# Patient Record
Sex: FEMALE | Race: OTHER | HISPANIC OR LATINO | URBAN - METROPOLITAN AREA
[De-identification: names, ages, dates, MRNs, and addresses within clinical notes are randomized per-mention and may not be internally consistent; named-entity substitution may affect disease eponyms.]

---

## 2018-03-19 ENCOUNTER — INPATIENT (INPATIENT)
Facility: HOSPITAL | Age: 53
LOS: 5 days | Discharge: HOME | DRG: 853 | End: 2018-03-25
Attending: SURGERY | Admitting: SURGERY
Payer: COMMERCIAL

## 2018-03-19 VITALS
TEMPERATURE: 100 F | HEART RATE: 94 BPM | SYSTOLIC BLOOD PRESSURE: 109 MMHG | RESPIRATION RATE: 27 BRPM | DIASTOLIC BLOOD PRESSURE: 56 MMHG | OXYGEN SATURATION: 98 %

## 2018-03-19 RX ORDER — INSULIN LISPRO 100/ML
VIAL (ML) SUBCUTANEOUS EVERY 6 HOURS
Qty: 0 | Refills: 0 | Status: DISCONTINUED | OUTPATIENT
Start: 2018-03-19 | End: 2018-03-20

## 2018-03-19 RX ORDER — DEXTROSE 50 % IN WATER 50 %
1 SYRINGE (ML) INTRAVENOUS ONCE
Qty: 0 | Refills: 0 | Status: DISCONTINUED | OUTPATIENT
Start: 2018-03-19 | End: 2018-03-22

## 2018-03-19 RX ORDER — DEXTROSE 50 % IN WATER 50 %
12.5 SYRINGE (ML) INTRAVENOUS ONCE
Qty: 0 | Refills: 0 | Status: DISCONTINUED | OUTPATIENT
Start: 2018-03-19 | End: 2018-03-22

## 2018-03-19 RX ORDER — ACETAMINOPHEN 500 MG
650 TABLET ORAL EVERY 6 HOURS
Qty: 0 | Refills: 0 | Status: DISCONTINUED | OUTPATIENT
Start: 2018-03-19 | End: 2018-03-20

## 2018-03-19 RX ORDER — SODIUM CHLORIDE 9 MG/ML
1000 INJECTION, SOLUTION INTRAVENOUS
Qty: 0 | Refills: 0 | Status: DISCONTINUED | OUTPATIENT
Start: 2018-03-19 | End: 2018-03-24

## 2018-03-19 RX ORDER — GLUCAGON INJECTION, SOLUTION 0.5 MG/.1ML
1 INJECTION, SOLUTION SUBCUTANEOUS ONCE
Qty: 0 | Refills: 0 | Status: DISCONTINUED | OUTPATIENT
Start: 2018-03-19 | End: 2018-03-22

## 2018-03-19 NOTE — H&P ADULT - NSHPPHYSICALEXAM_GEN_ALL_CORE
.  VITAL SIGNS:  T(C): 37.9 (03-19-18 @ 23:51), Max: 37.9 (03-19-18 @ 23:51)  T(F): 100.2 (03-19-18 @ 23:51), Max: 100.2 (03-19-18 @ 23:51)  HR: 98 (03-20-18 @ 00:00) (94 - 98)  BP: 104/78 (03-20-18 @ 00:00) (104/78 - 109/56)  BP(mean): 91 (03-20-18 @ 00:00) (82 - 91)  RR: 32 (03-20-18 @ 00:00) (27 - 32)  SpO2: 97% (03-20-18 @ 00:00) (97% - 98%)  Wt(kg): --    PHYSICAL EXAM:    Constitutional: WDWN, In some distress 2/2 pain, pale   Head: NC/AT  Eyes: PERRL, EOMI, anicteric sclera, arcus senilis bilaterally  ENT: no nasal discharge; uvula midline, some erythema, severely dry mucous membranes  Neck: supple; no JVD, significant hard and tender LAD bilaterally and possibly enlarged thyroid though unclear 2/2 body habitus  Respiratory: CTA B/L; no W/R/R,   Cardiac: +S1/S2; RRR; no M/R/G;   Gastrointestinal: soft, tender pain in the epigastric region, non-radiating, ND; no rebound or guarding; +BSx4. Negative murphys.   Extremities: WWP, no clubbing or cyanosis; no peripheral edema  Musculoskeletal: NROM x4; no joint swelling, tenderness or erythema  Vascular: 2+ radial, femoral, DP/PT pulses B/L  Dermatologic: skin warm, dry and intact; no rashes, wounds, or scars  Neurologic: AAOx3; CNII-XII grossly intact; no focal deficits  Psychiatric: affect and characteristics of appearance, verbalizations, behaviors are appropriate

## 2018-03-19 NOTE — H&P ADULT - ASSESSMENT
52YF w/ pmhx of DM and intermittent hypotension presents to [facility] w/ prolonged history of epigastric pain, poor appetite, n/v c/f severe GERD vs. ulceration vs. malignancy.     GI: Symptoms c/w possible H. Pylori/GERD (patient from endemic region, never had EGD/colonsocopy or work up). Hiatal hernia vs. esophageal stricture/dysmotility (though does not fit HPI) vs. achalasia. Stomach ulcers, may be chronic, no melanotic stool. Malignancy whether gastric, pancreatic, or esophageal (LAD is concerning). Lower suspicion for cholangitis based on clinical picture and toxicity.   - GI aware, to to ERCP in AM   - H. pylori stool antigen   - NPO   - Active T&S   - Zosyn for empiric intraabdominal infection   - f/u Bcx     RESP: Erythema in throat, complains of + throat pain on swallowing, no nasal congestion/rhinorrhea or cough. Some c/f strep vs. viral illness (may have precipitated her lightheadedness).   - f/u RVP and strep   - f/u AM CXR     CARDIO: Never had chest pain, ACS symptoms. No edema/orthopnea. Never seen a cardiologist or gotten a stress test. Unclear etiology for intermittent hypotension (12 years now), may be neurogenic/vasovagal vs. arrythmia. Patient recalls events.   - f/u EKG     ENDO: On metformin 500 at home. Possible euthyroid sick syndrome w/ enlarged thyroid (subacute vs. deQuervain)   - f/u AM Hba1c   - f/u TSH panel     RENAL: Urinary frequency of unclear etiology, no dysuria or overflow incontinence per patient.   - f/u UA     FEN:  F: NS 200cc/hr   E: Replete PRN  N: NPO  R IJ central   FULL CODE 52YF w/ pmhx of DM and intermittent hypotension presents to [facility] w/ prolonged history of epigastric pain, poor appetite, n/v c/f severe GERD vs. ulceration vs. malignancy.     GI: Symptoms c/w possible H. Pylori/GERD (patient from endemic region, never had EGD/colonsocopy or work up). Hiatal hernia vs. esophageal stricture/dysmotility (though does not fit HPI) vs. achalasia. Stomach ulcers, may be chronic, no melanotic stool. Malignancy whether gastric, pancreatic, or esophageal (LAD is concerning). Lower suspicion for cholangitis based on clinical picture and toxicity.   - GI aware, to to ERCP in AM   - H. pylori stool antigen   - NPO   - Active T&S   - Zosyn for empiric intraabdominal infection   - f/u Bcx   - PPI     RESP: Erythema in throat, complains of + throat pain on swallowing, no nasal congestion/rhinorrhea or cough. Some c/f strep vs. viral illness (may have precipitated her lightheadedness).   - f/u RVP and strep   - f/u AM CXR     CARDIO: Never had chest pain, ACS symptoms. No edema/orthopnea. Never seen a cardiologist or gotten a stress test. Unclear etiology for intermittent hypotension (12 years now), may be neurogenic/vasovagal vs. arrythmia. Patient recalls events.   - f/u EKG     ENDO: On metformin 500 at home. Possible euthyroid sick syndrome w/ enlarged thyroid (subacute vs. deQuervain)   - f/u AM Hba1c   - f/u TSH panel   - ISS     RENAL: Urinary frequency of unclear etiology, no dysuria or overflow incontinence per patient.   - f/u UA     FEN:  F: NS 200cc/hr   E: Replete PRN  N: NPO  R IJ central   FULL CODE 52YF w/ pmhx of DM and intermittent hypotension presents to [facility] w/ prolonged history of epigastric pain, poor appetite, n/v c/f severe GERD vs. ulceration vs. malignancy.     GI: Symptoms c/w possible H. Pylori/GERD (patient from endemic region, never had EGD/colonsocopy or work up). Hiatal hernia vs. esophageal stricture/dysmotility (though does not fit HPI) vs. achalasia. Stomach ulcers, may be chronic, no melanotic stool. Malignancy whether gastric, pancreatic, or esophageal (LAD is concerning). Lower suspicion for cholangitis based on clinical picture and toxicity. Admitted for severe sepsis.   - GI aware, to to ERCP in AM   - H. pylori stool antigen   - NPO   - Active T&S   - Zosyn for empiric intraabdominal infection   - f/u Bcx   - PPI     RESP: Erythema in throat, complains of + throat pain on swallowing, no nasal congestion/rhinorrhea or cough. Some c/f strep vs. viral illness (may have precipitated her lightheadedness).   - f/u RVP and strep   - f/u AM CXR     CARDIO: Never had chest pain, ACS symptoms. No edema/orthopnea. Never seen a cardiologist or gotten a stress test. Unclear etiology for intermittent hypotension (12 years now), may be neurogenic/vasovagal vs. arrythmia. Patient recalls events.   - f/u EKG     ENDO: On metformin 500 at home. Possible euthyroid sick syndrome w/ enlarged thyroid (subacute vs. deQuervain)   - f/u AM Hba1c   - f/u TSH panel   - ISS     RENAL: Urinary frequency of unclear etiology, no dysuria or overflow incontinence per patient.   - f/u UA     FEN:  F: NS 200cc/hr   E: Replete PRN  N: NPO  R IJ central   FULL CODE

## 2018-03-19 NOTE — H&P ADULT - HISTORY OF PRESENT ILLNESS
52YF w/ pmhx of _____ presented to [facility] w/ RUQ pain n/v found to have elevated LFTs, lipase of 682, and CT abd that showed ____ c/f gallstone pancreatitits w/ hypotension requiring several NS bolus and peripheral levo. No leukocytosis or fever, but lactate of 7.6. Planned for ERCP on Wed. but determined to be too acute and transferred to [facility] for earlier ERCP. GI consulted and surgery made aware (Dr. Richards). 52YF w/ pmhx of ?DM (prev. hba1c of ?8.1%) and intermittent hypotension of unknown etiology (never been worked up by a cardiologist) presented to [facility] after being found unconscious on her couch and 'green'. She has had a 6mo hx of epigastric pain, non-radiating, dull, with inability to swallow solid foods and reduced appetite associated w/ n/v and 8-10 pound weight loss. Able to tolerate fluids. No diarrhea or change in stool habits. Feels as if food gets 'stuck' in epigastric region. No significant pain with swallowing in the upper esophageal region. Also notable is a history of intermittent swinging f/c, with chills predominantly at night requiring multiple blankets and painful lymph nodes in the neck bilaterally. Recent breast biopsy in Feb. found to be benign.     At [facility] patient with severe epigastric pain found to have elevated LFTs, lipase of 682, and CT a/p gallstone pancreatitis w/ hypotension requiring several L NS and peripheral levo. No leukocytosis or fever, but lactate of 7.6. Planned for ERCP on Wed. but determined to be too acute and transferred to [facility] for earlier ERCP. GI consulted and surgery made aware (Dr. Richards).     No recent travel. Immigrated from [facility] 35 years ago. No cough. Complains of increased urinary frequency the last couple months but no dysuria. One week of steroid use in february for ?sinusitis. 52YF w/ pmhx of ?DM (prev. hba1c of ?8.1%) and intermittent hypotension of unknown etiology (never been worked up by a cardiologist) presented to [facility] after being found unconscious on her couch and 'green'. She has had a 6mo hx of epigastric pain, non-radiating, dull, with inability to swallow solid foods and reduced appetite associated w/ n/v and 8-10 pound weight loss. Able to tolerate fluids. No diarrhea or change in stool habits. Feels as if food gets 'stuck' in epigastric region. No significant pain with swallowing in the upper esophageal region. Also notable is a history of intermittent swinging f/c, with chills predominantly at night requiring multiple blankets and painful lymph nodes in the neck bilaterally. Recent breast biopsy in Feb. found to be benign.     At [facility] patient with severe epigastric pain found to have elevated LFTs, lipase of 682, and CT a/p c/f gallstone pancreatitis w/ hypotension requiring several L NS and peripheral levo. No leukocytosis or fever, but lactate of 7.6. Planned for ERCP on Wed. but determined to be too acute and transferred to [facility] for earlier ERCP. GI consulted and surgery made aware (Dr. Richards). Given zosyn/clinda.     No recent travel. Immigrated from [facility] 35 years ago. No cough. Complains of increased urinary frequency the last couple months but no dysuria. One week of steroid use in february for ?sinusitis. 52YF w/ pmhx of ?DM (prev. hba1c of ?8.1%) and intermittent hypotension of unknown etiology (never been worked up by a cardiologist) presented to [facility] after being found unconscious on her couch and 'green'. She has had a 6mo hx of epigastric pain, non-radiating, dull, with inability to swallow solid foods and reduced appetite associated w/ n/v and 8-10 pound weight loss. Able to tolerate fluids. No diarrhea or change in stool habits. Feels as if food gets 'stuck' in epigastric region. No significant pain with swallowing in the upper esophageal region. Also notable is a history of intermittent swinging f/c, with chills predominantly at night requiring multiple blankets and painful lymph nodes in the neck bilaterally. Recent breast biopsy in Feb. found to be benign.     At [facility] patient with severe epigastric pain found to have elevated LFTs 214/112, lipase of 682, Tbili 2.8, alk phos 457, and CT a/p c/f choledoco w/ 3 small distal calculi in common bile duct with mild extrahepatic ductal dilation w/ CBD measure 1cm. Christina-pancreatic tail edema.     Had hypotension requiring several L NS and peripheral levo and tachycardia to 140s. No leukocytosis or fever, but lactate of 7.6. Planned for ERCP on Wed. but determined to be too acute and transferred to [facility] for earlier ERCP. GI consulted and surgery made aware (Dr. Richards). Given zosyn/clinda.     No recent travel. Immig[place] from [facility] 35 years ago. No cough. Complains of increased urinary frequency the last couple months but no dysuria. One week of steroid use in february for ?sinusitis. 52YF w/ pmhx of ?DM (prev. hba1c of ?8.1%) and intermittent hypotension of unknown etiology (never been worked up by a cardiologist) presented to [facility] after being found unconscious on her couch and 'green'. She has had a 6mo hx of epigastric pain, non-radiating, dull, with inability to swallow solid foods and reduced appetite associated w/ n/v and 8-10 pound weight loss. Able to tolerate fluids. No diarrhea or change in stool habits. Feels as if food gets 'stuck' in epigastric region. No significant pain with swallowing in the upper esophageal region. Also notable is a history of intermittent swinging f/c, with chills predominantly at night requiring multiple blankets and painful lymph nodes in the neck bilaterally. Recent breast biopsy in Feb. found to be benign.     At [facility] patient with severe epigastric pain found to have elevated LFTs 214/112, lipase of 682, Tbili 2.8, alk phos 457, and CT a/p c/f choledoco w/ 3 small distal calculi in common bile duct with mild extrahepatic ductal dilation w/ CBD measure 1cm. Christina-pancreatic tail edema.     Had refractory hypotension requiring several L NS w/ shaniqua of 80 systolic and peripheral levo and tachycardia to 140s. No leukocytosis or fever, but lactate of 7.6. Planned for ERCP on Wed. but determined to be too acute and transferred to [facility] for earlier ERCP. GI consulted and surgery made aware (Dr. Richards). Given zosyn/clinda.     No recent travel. Immigrated from [facility] 35 years ago. No cough. Complains of increased urinary frequency the last couple months but no dysuria. One week of steroid use in february for ?sinusitis.

## 2018-03-20 LAB
-  K. PNEUMONIAE GROUP: SIGNIFICANT CHANGE UP
ALBUMIN SERPL ELPH-MCNC: 2.6 G/DL — LOW (ref 3.3–5)
ALBUMIN SERPL ELPH-MCNC: 2.9 G/DL — LOW (ref 3.3–5)
ALP SERPL-CCNC: 264 U/L — HIGH (ref 40–120)
ALP SERPL-CCNC: 301 U/L — HIGH (ref 40–120)
ALT FLD-CCNC: 84 U/L — HIGH (ref 10–45)
ALT FLD-CCNC: 90 U/L — HIGH (ref 10–45)
ANION GAP SERPL CALC-SCNC: 17 MMOL/L — SIGNIFICANT CHANGE UP (ref 5–17)
ANION GAP SERPL CALC-SCNC: 8 MMOL/L — SIGNIFICANT CHANGE UP (ref 5–17)
APPEARANCE UR: (no result)
APTT BLD: 21.6 SEC — LOW (ref 27.5–37.4)
AST SERPL-CCNC: 147 U/L — HIGH (ref 10–40)
AST SERPL-CCNC: 169 U/L — HIGH (ref 10–40)
BILIRUB DIRECT SERPL-MCNC: 2.4 MG/DL — HIGH (ref 0–0.2)
BILIRUB SERPL-MCNC: 3.1 MG/DL — HIGH (ref 0.2–1.2)
BILIRUB SERPL-MCNC: 3.2 MG/DL — HIGH (ref 0.2–1.2)
BILIRUB UR-MCNC: (no result)
BLD GP AB SCN SERPL QL: NEGATIVE — SIGNIFICANT CHANGE UP
BLD GP AB SCN SERPL QL: NEGATIVE — SIGNIFICANT CHANGE UP
BUN SERPL-MCNC: 11 MG/DL — SIGNIFICANT CHANGE UP (ref 7–23)
BUN SERPL-MCNC: 9 MG/DL — SIGNIFICANT CHANGE UP (ref 7–23)
CALCIUM SERPL-MCNC: 7.9 MG/DL — LOW (ref 8.4–10.5)
CALCIUM SERPL-MCNC: 8.1 MG/DL — LOW (ref 8.4–10.5)
CHLORIDE SERPL-SCNC: 105 MMOL/L — SIGNIFICANT CHANGE UP (ref 96–108)
CHLORIDE SERPL-SCNC: 97 MMOL/L — SIGNIFICANT CHANGE UP (ref 96–108)
CHOLEST SERPL-MCNC: 152 MG/DL — SIGNIFICANT CHANGE UP (ref 10–199)
CK MB CFR SERPL CALC: 1.8 NG/ML — SIGNIFICANT CHANGE UP (ref 0–6.7)
CK SERPL-CCNC: 120 U/L — SIGNIFICANT CHANGE UP (ref 25–170)
CO2 SERPL-SCNC: 21 MMOL/L — LOW (ref 22–31)
CO2 SERPL-SCNC: 25 MMOL/L — SIGNIFICANT CHANGE UP (ref 22–31)
COLOR SPEC: YELLOW — SIGNIFICANT CHANGE UP
CREAT SERPL-MCNC: 0.8 MG/DL — SIGNIFICANT CHANGE UP (ref 0.5–1.3)
CREAT SERPL-MCNC: 0.99 MG/DL — SIGNIFICANT CHANGE UP (ref 0.5–1.3)
DIFF PNL FLD: NEGATIVE — SIGNIFICANT CHANGE UP
GAS PNL BLDV: SIGNIFICANT CHANGE UP
GLUCOSE BLDC GLUCOMTR-MCNC: 136 MG/DL — HIGH (ref 70–99)
GLUCOSE BLDC GLUCOMTR-MCNC: 207 MG/DL — HIGH (ref 70–99)
GLUCOSE BLDC GLUCOMTR-MCNC: 216 MG/DL — HIGH (ref 70–99)
GLUCOSE BLDC GLUCOMTR-MCNC: 261 MG/DL — HIGH (ref 70–99)
GLUCOSE BLDC GLUCOMTR-MCNC: 283 MG/DL — HIGH (ref 70–99)
GLUCOSE SERPL-MCNC: 240 MG/DL — HIGH (ref 70–99)
GLUCOSE SERPL-MCNC: 377 MG/DL — HIGH (ref 70–99)
GLUCOSE UR QL: >=1000
GRAM STN FLD: SIGNIFICANT CHANGE UP
HBA1C BLD-MCNC: 10.6 % — HIGH (ref 4–5.6)
HCT VFR BLD CALC: 32.5 % — LOW (ref 34.5–45)
HCT VFR BLD CALC: 35.4 % — SIGNIFICANT CHANGE UP (ref 34.5–45)
HDLC SERPL-MCNC: 50 MG/DL — SIGNIFICANT CHANGE UP (ref 40–125)
HGB BLD-MCNC: 10.8 G/DL — LOW (ref 11.5–15.5)
HGB BLD-MCNC: 11.9 G/DL — SIGNIFICANT CHANGE UP (ref 11.5–15.5)
INR BLD: 1.26 — HIGH (ref 0.88–1.16)
KETONES UR-MCNC: (no result) MG/DL
LACTATE SERPL-SCNC: 1.5 MMOL/L — SIGNIFICANT CHANGE UP (ref 0.5–2)
LACTATE SERPL-SCNC: 3.3 MMOL/L — HIGH (ref 0.5–2)
LEUKOCYTE ESTERASE UR-ACNC: NEGATIVE — SIGNIFICANT CHANGE UP
LIDOCAIN IGE QN: 39 U/L — SIGNIFICANT CHANGE UP (ref 7–60)
LIPID PNL WITH DIRECT LDL SERPL: 74 MG/DL — SIGNIFICANT CHANGE UP
LYMPHOCYTES # BLD AUTO: 15 % — SIGNIFICANT CHANGE UP (ref 13–44)
LYMPHOCYTES # BLD AUTO: 6 % — LOW (ref 13–44)
MAGNESIUM SERPL-MCNC: 1.2 MG/DL — LOW (ref 1.6–2.6)
MAGNESIUM SERPL-MCNC: 3.1 MG/DL — HIGH (ref 1.6–2.6)
MCHC RBC-ENTMCNC: 30.3 PG — SIGNIFICANT CHANGE UP (ref 27–34)
MCHC RBC-ENTMCNC: 30.8 PG — SIGNIFICANT CHANGE UP (ref 27–34)
MCHC RBC-ENTMCNC: 33.2 G/DL — SIGNIFICANT CHANGE UP (ref 32–36)
MCHC RBC-ENTMCNC: 33.6 G/DL — SIGNIFICANT CHANGE UP (ref 32–36)
MCV RBC AUTO: 91.3 FL — SIGNIFICANT CHANGE UP (ref 80–100)
MCV RBC AUTO: 91.7 FL — SIGNIFICANT CHANGE UP (ref 80–100)
METHOD TYPE: SIGNIFICANT CHANGE UP
MONOCYTES NFR BLD AUTO: 5 % — SIGNIFICANT CHANGE UP (ref 2–14)
MONOCYTES NFR BLD AUTO: 6 % — SIGNIFICANT CHANGE UP (ref 2–14)
NEUTROPHILS NFR BLD AUTO: 59 % — SIGNIFICANT CHANGE UP (ref 43–77)
NEUTROPHILS NFR BLD AUTO: 67 % — SIGNIFICANT CHANGE UP (ref 43–77)
NITRITE UR-MCNC: NEGATIVE — SIGNIFICANT CHANGE UP
PH UR: 5.5 — SIGNIFICANT CHANGE UP (ref 5–8)
PHOSPHATE SERPL-MCNC: 3.3 MG/DL — SIGNIFICANT CHANGE UP (ref 2.5–4.5)
PLATELET # BLD AUTO: 168 K/UL — SIGNIFICANT CHANGE UP (ref 150–400)
PLATELET # BLD AUTO: 190 K/UL — SIGNIFICANT CHANGE UP (ref 150–400)
POTASSIUM SERPL-MCNC: 3.8 MMOL/L — SIGNIFICANT CHANGE UP (ref 3.5–5.3)
POTASSIUM SERPL-MCNC: 4 MMOL/L — SIGNIFICANT CHANGE UP (ref 3.5–5.3)
POTASSIUM SERPL-SCNC: 3.8 MMOL/L — SIGNIFICANT CHANGE UP (ref 3.5–5.3)
POTASSIUM SERPL-SCNC: 4 MMOL/L — SIGNIFICANT CHANGE UP (ref 3.5–5.3)
PROT SERPL-MCNC: 6.6 G/DL — SIGNIFICANT CHANGE UP (ref 6–8.3)
PROT SERPL-MCNC: 7.5 G/DL — SIGNIFICANT CHANGE UP (ref 6–8.3)
PROT UR-MCNC: (no result) MG/DL
PROTHROM AB SERPL-ACNC: 14 SEC — HIGH (ref 9.8–12.7)
RAPID RVP RESULT: SIGNIFICANT CHANGE UP
RBC # BLD: 3.56 M/UL — LOW (ref 3.8–5.2)
RBC # BLD: 3.86 M/UL — SIGNIFICANT CHANGE UP (ref 3.8–5.2)
RBC # FLD: 13.4 % — SIGNIFICANT CHANGE UP (ref 10.3–16.9)
RBC # FLD: 13.5 % — SIGNIFICANT CHANGE UP (ref 10.3–16.9)
RH IG SCN BLD-IMP: POSITIVE — SIGNIFICANT CHANGE UP
RH IG SCN BLD-IMP: POSITIVE — SIGNIFICANT CHANGE UP
S PYO AG SPEC QL IA: NEGATIVE — SIGNIFICANT CHANGE UP
SODIUM SERPL-SCNC: 135 MMOL/L — SIGNIFICANT CHANGE UP (ref 135–145)
SODIUM SERPL-SCNC: 138 MMOL/L — SIGNIFICANT CHANGE UP (ref 135–145)
SP GR SPEC: 1.01 — SIGNIFICANT CHANGE UP (ref 1–1.03)
TOTAL CHOLESTEROL/HDL RATIO MEASUREMENT: 3 RATIO — LOW (ref 3.3–7.1)
TRIGL SERPL-MCNC: 140 MG/DL — SIGNIFICANT CHANGE UP (ref 10–149)
TROPONIN T SERPL-MCNC: <0.01 NG/ML — SIGNIFICANT CHANGE UP (ref 0–0.01)
TSH SERPL-MCNC: 0.68 UIU/ML — SIGNIFICANT CHANGE UP (ref 0.35–4.94)
UROBILINOGEN FLD QL: 1 E.U./DL — SIGNIFICANT CHANGE UP
WBC # BLD: 13.8 K/UL — HIGH (ref 3.8–10.5)
WBC # BLD: 15.5 K/UL — HIGH (ref 3.8–10.5)
WBC # FLD AUTO: 13.8 K/UL — HIGH (ref 3.8–10.5)
WBC # FLD AUTO: 15.5 K/UL — HIGH (ref 3.8–10.5)

## 2018-03-20 PROCEDURE — 71045 X-RAY EXAM CHEST 1 VIEW: CPT | Mod: 26

## 2018-03-20 PROCEDURE — 99233 SBSQ HOSP IP/OBS HIGH 50: CPT | Mod: GC

## 2018-03-20 PROCEDURE — 43262 ENDO CHOLANGIOPANCREATOGRAPH: CPT

## 2018-03-20 RX ORDER — PANTOPRAZOLE SODIUM 20 MG/1
40 TABLET, DELAYED RELEASE ORAL DAILY
Qty: 0 | Refills: 0 | Status: DISCONTINUED | OUTPATIENT
Start: 2018-03-20 | End: 2018-03-24

## 2018-03-20 RX ORDER — HEPARIN SODIUM 5000 [USP'U]/ML
5000 INJECTION INTRAVENOUS; SUBCUTANEOUS EVERY 8 HOURS
Qty: 0 | Refills: 0 | Status: DISCONTINUED | OUTPATIENT
Start: 2018-03-20 | End: 2018-03-24

## 2018-03-20 RX ORDER — NOREPINEPHRINE BITARTRATE/D5W 8 MG/250ML
0.01 PLASTIC BAG, INJECTION (ML) INTRAVENOUS
Qty: 8 | Refills: 0 | Status: DISCONTINUED | OUTPATIENT
Start: 2018-03-20 | End: 2018-03-22

## 2018-03-20 RX ORDER — PIPERACILLIN AND TAZOBACTAM 4; .5 G/20ML; G/20ML
3.38 INJECTION, POWDER, LYOPHILIZED, FOR SOLUTION INTRAVENOUS ONCE
Qty: 0 | Refills: 0 | Status: COMPLETED | OUTPATIENT
Start: 2018-03-20 | End: 2018-03-20

## 2018-03-20 RX ORDER — TRAMADOL HYDROCHLORIDE 50 MG/1
25 TABLET ORAL EVERY 4 HOURS
Qty: 0 | Refills: 0 | Status: DISCONTINUED | OUTPATIENT
Start: 2018-03-20 | End: 2018-03-20

## 2018-03-20 RX ORDER — INSULIN HUMAN 100 [IU]/ML
2 INJECTION, SOLUTION SUBCUTANEOUS
Qty: 100 | Refills: 0 | Status: DISCONTINUED | OUTPATIENT
Start: 2018-03-20 | End: 2018-03-20

## 2018-03-20 RX ORDER — POTASSIUM CHLORIDE 20 MEQ
20 PACKET (EA) ORAL
Qty: 0 | Refills: 0 | Status: COMPLETED | OUTPATIENT
Start: 2018-03-20 | End: 2018-03-20

## 2018-03-20 RX ORDER — MORPHINE SULFATE 50 MG/1
1 CAPSULE, EXTENDED RELEASE ORAL EVERY 4 HOURS
Qty: 0 | Refills: 0 | Status: DISCONTINUED | OUTPATIENT
Start: 2018-03-20 | End: 2018-03-20

## 2018-03-20 RX ORDER — INSULIN HUMAN 100 [IU]/ML
3 INJECTION, SOLUTION SUBCUTANEOUS ONCE
Qty: 0 | Refills: 0 | Status: DISCONTINUED | OUTPATIENT
Start: 2018-03-20 | End: 2018-03-20

## 2018-03-20 RX ORDER — INSULIN LISPRO 100/ML
VIAL (ML) SUBCUTANEOUS
Qty: 0 | Refills: 0 | Status: DISCONTINUED | OUTPATIENT
Start: 2018-03-20 | End: 2018-03-21

## 2018-03-20 RX ORDER — HEPARIN SODIUM 5000 [USP'U]/ML
5000 INJECTION INTRAVENOUS; SUBCUTANEOUS EVERY 8 HOURS
Qty: 0 | Refills: 0 | Status: DISCONTINUED | OUTPATIENT
Start: 2018-03-20 | End: 2018-03-20

## 2018-03-20 RX ORDER — SODIUM CHLORIDE 9 MG/ML
1000 INJECTION INTRAMUSCULAR; INTRAVENOUS; SUBCUTANEOUS
Qty: 0 | Refills: 0 | Status: DISCONTINUED | OUTPATIENT
Start: 2018-03-20 | End: 2018-03-21

## 2018-03-20 RX ORDER — PIPERACILLIN AND TAZOBACTAM 4; .5 G/20ML; G/20ML
3.38 INJECTION, POWDER, LYOPHILIZED, FOR SOLUTION INTRAVENOUS EVERY 6 HOURS
Qty: 0 | Refills: 0 | Status: DISCONTINUED | OUTPATIENT
Start: 2018-03-20 | End: 2018-03-22

## 2018-03-20 RX ORDER — MAGNESIUM SULFATE 500 MG/ML
4 VIAL (ML) INJECTION ONCE
Qty: 0 | Refills: 0 | Status: COMPLETED | OUTPATIENT
Start: 2018-03-20 | End: 2018-03-20

## 2018-03-20 RX ORDER — MORPHINE SULFATE 50 MG/1
2 CAPSULE, EXTENDED RELEASE ORAL EVERY 4 HOURS
Qty: 0 | Refills: 0 | Status: DISCONTINUED | OUTPATIENT
Start: 2018-03-20 | End: 2018-03-20

## 2018-03-20 RX ADMIN — Medication 6: at 22:56

## 2018-03-20 RX ADMIN — PIPERACILLIN AND TAZOBACTAM 200 GRAM(S): 4; .5 INJECTION, POWDER, LYOPHILIZED, FOR SOLUTION INTRAVENOUS at 12:01

## 2018-03-20 RX ADMIN — PIPERACILLIN AND TAZOBACTAM 200 GRAM(S): 4; .5 INJECTION, POWDER, LYOPHILIZED, FOR SOLUTION INTRAVENOUS at 01:08

## 2018-03-20 RX ADMIN — PIPERACILLIN AND TAZOBACTAM 200 GRAM(S): 4; .5 INJECTION, POWDER, LYOPHILIZED, FOR SOLUTION INTRAVENOUS at 23:04

## 2018-03-20 RX ADMIN — SODIUM CHLORIDE 100 MILLILITER(S): 9 INJECTION INTRAMUSCULAR; INTRAVENOUS; SUBCUTANEOUS at 23:17

## 2018-03-20 RX ADMIN — PANTOPRAZOLE SODIUM 40 MILLIGRAM(S): 20 TABLET, DELAYED RELEASE ORAL at 12:01

## 2018-03-20 RX ADMIN — Medication 1.27 MICROGRAM(S)/KG/MIN: at 20:33

## 2018-03-20 RX ADMIN — Medication 100 GRAM(S): at 02:27

## 2018-03-20 RX ADMIN — Medication 100 MILLIEQUIVALENT(S): at 03:57

## 2018-03-20 RX ADMIN — TRAMADOL HYDROCHLORIDE 25 MILLIGRAM(S): 50 TABLET ORAL at 03:57

## 2018-03-20 RX ADMIN — HEPARIN SODIUM 5000 UNIT(S): 5000 INJECTION INTRAVENOUS; SUBCUTANEOUS at 22:56

## 2018-03-20 RX ADMIN — PIPERACILLIN AND TAZOBACTAM 200 GRAM(S): 4; .5 INJECTION, POWDER, LYOPHILIZED, FOR SOLUTION INTRAVENOUS at 05:53

## 2018-03-20 RX ADMIN — Medication 6: at 01:34

## 2018-03-20 RX ADMIN — Medication 100 MILLIEQUIVALENT(S): at 02:28

## 2018-03-20 RX ADMIN — Medication 1.27 MICROGRAM(S)/KG/MIN: at 03:57

## 2018-03-20 RX ADMIN — Medication 4: at 05:55

## 2018-03-20 RX ADMIN — PIPERACILLIN AND TAZOBACTAM 200 GRAM(S): 4; .5 INJECTION, POWDER, LYOPHILIZED, FOR SOLUTION INTRAVENOUS at 18:28

## 2018-03-20 RX ADMIN — SODIUM CHLORIDE 250 MILLILITER(S): 9 INJECTION INTRAMUSCULAR; INTRAVENOUS; SUBCUTANEOUS at 01:08

## 2018-03-20 RX ADMIN — TRAMADOL HYDROCHLORIDE 25 MILLIGRAM(S): 50 TABLET ORAL at 05:30

## 2018-03-20 NOTE — CONSULT NOTE ADULT - SUBJECTIVE AND OBJECTIVE BOX
HPI:  51 YO F w/ h/o DM and intermittent hypotension of unknown etiology (never been worked up by a cardiologist) presented to [facility] after being found unconscious. Pt reports nausea, vomiting, and RUQ abdominal pain starting at 9 AM yesterday. She also reports dysphagia with solids, but not liquids, and feels food getting stuck in her epigastrium. Reports fever, chills, but denies CP, SOB, melena, hematochezia, diarrhea. Denies previous EGD, last colonoscopy was years ago, does not recall results.     Allergies  No Known Drug Allergies  Seafood (Swelling; Rash)    Home Medications:  metFORMIN 500 mg oral tablet, extended release: 1 tab(s) orally once a day (20 Mar 2018 00:43)    MEDICATIONS:  MEDICATIONS  (STANDING):  dextrose 5%. 1000 milliLiter(s) (50 mL/Hr) IV Continuous <Continuous>  dextrose 50% Injectable 12.5 Gram(s) IV Push once  insulin lispro (HumaLOG) corrective regimen sliding scale   SubCutaneous every 6 hours  norepinephrine Infusion 0.01 MICROgram(s)/kG/Min (1.275 mL/Hr) IV Continuous <Continuous>  pantoprazole  Injectable 40 milliGRAM(s) IV Push daily  piperacillin/tazobactam IVPB. 3.375 Gram(s) IV Intermittent every 6 hours  sodium chloride 0.9%. 1000 milliLiter(s) (100 mL/Hr) IV Continuous <Continuous>    MEDICATIONS  (PRN):  dextrose Gel 1 Dose(s) Oral once PRN Blood Glucose LESS THAN 70 milliGRAM(s)/deciliter  glucagon  Injectable 1 milliGRAM(s) IntraMuscular once PRN Glucose LESS THAN 70 milligrams/deciliter  traMADol 25 milliGRAM(s) Oral every 4 hours PRN Moderate Pain (4 - 6)    PAST MEDICAL & SURGICAL HISTORY:  Diabetes   delivery delivered    FAMILY HISTORY:  No pertinent family history in first degree relatives    SOCIAL HISTORY:  [place]: Denies  Alcohol: Denies  Illicit Drugs: Denies    REVIEW OF SYSTEMS: per HPI    Vital Signs Last 24 Hrs  T(C): 37.1 (20 Mar 2018 06:00), Max: 37.9 (19 Mar 2018 23:51)  T(F): 98.7 (20 Mar 2018 06:00), Max: 100.2 (19 Mar 2018 23:51)  HR: 80 (20 Mar 2018 06:00) (78 - 98)  BP: 96/58 (20 Mar 2018 06:00) (90/52 - 109/56)  BP(mean): 73 (20 Mar 2018 06:00) (69 - 91)  RR: 13 (20 Mar 2018 06:00) (13 - 32)  SpO2: 96% (20 Mar 2018 06:00) (95% - 98%)     @ 07:01  -  03 @ 06:51  --------------------------------------------------------  IN: 2062 mL / OUT: 700 mL / NET: 1362 mL    PHYSICAL EXAM:    General: Well developed; well nourished; in no acute distress  HEENT: MMM, conjunctiva and sclera clear  Gastrointestinal: Soft, RUQ TTP, (+) [place], obese non-distended;  No rebound or guarding  Extremities: Normal range of motion, No clubbing, cyanosis or edema  Neurological: Alert and oriented x3  Skin: Warm and dry. No obvious rash    LABS:                        10.8   13.8  )-----------( 168      ( 20 Mar 2018 06:19 )             32.5     20    135  |  97  |  11  ----------------------------<  377<H>  3.8   |  21<L>  |  0.99    Ca    8.1<L>      20 Mar 2018 00:22  Phos  3.3       Mg     1.2         TPro  7.5  /  Alb  2.9<L>  /  TBili  3.2<H>  /  DBili  2.4<H>  /  AST  169<H>  /  ALT  90<H>  /  AlkPhos  301<H>      PT/INR - ( 20 Mar 2018 00:22 )   PT: 14.0 sec;   INR: 1.26        PTT - ( 20 Mar 2018 00:22 )  PTT:21.6 sec    RADIOLOGY & ADDITIONAL STUDIES:  Xray Chest 1 View-PORTABLE IMMEDIATE (18 @ 04:15) PRELIMINARY    IMPRESSION:  Low lung volumes. Possible small bilateral pleural effusions.   Appropriatelypositioned left-sided IJV catheter.    CT A/P without IVC 3/19/18 @ [facility]:  Impression:  1. Choledocholithiasis. There are approximately 3 small calculi in the distal common duct. Mild extrahepatic ductal dilation with the common duct measuring 1 cm. No intrahepatic biliary ductal dilation to suggest a high-grade obstruction.   2. Mild peripancreatic edema around the pancreatic tail. Please correlate clinically for potential acute gallstone pancreatitis    ADDENDUM:  Two of the punctate choledocholiths may be in the apparent low insertion cystic duct

## 2018-03-20 NOTE — PROGRESS NOTE ADULT - SUBJECTIVE AND OBJECTIVE BOX
INTERVAL HPI/OVERNIGHT EVENTS: Admission to MICU    SUBJECTIVE: Patient seen and examined at bedside.    OBJECTIVE:    VITAL SIGNS:  ICU Vital Signs Last 24 Hrs  T(C): 36.2 (20 Mar 2018 14:00), Max: 37.9 (19 Mar 2018 23:51)  T(F): 97.1 (20 Mar 2018 14:00), Max: 100.2 (19 Mar 2018 23:51)  HR: 66 (20 Mar 2018 13:00) (63 - 98)  BP: 87/52 (20 Mar 2018 12:00) (87/52 - 109/56)  BP(mean): 67 (20 Mar 2018 12:00) (67 - 91)  ABP: 93/50 (20 Mar 2018 13:00) (93/50 - 102/56)  ABP(mean): 66 (20 Mar 2018 13:00) (66 - 74)  RR: 18 (20 Mar 2018 13:00) (13 - 32)  SpO2: 97% (20 Mar 2018 13:00) (93% - 98%)         @ 07: @ 07:00  --------------------------------------------------------  IN: 1962 mL / OUT: 1500 mL / NET: 462 mL     @ 07:01  20 @ 14:32  --------------------------------------------------------  IN: 25 mL / OUT: 600 mL / NET: -575 mL      CAPILLARY BLOOD GLUCOSE  209 (20 Mar 2018 12:00)      POCT Blood Glucose.: 207 mg/dL (20 Mar 2018 11:01)      PHYSICAL EXAM:    General: WDWN ; NAD  HEENT: NC/AT; PERRL, anicteric sclera  Neck: supple, no JVD  Respiratory: CTA B/L; no W/R/R  Cardiovascular: +S1/S2; RRR; no M/R/G  Gastrointestinal: soft, NT/ND; +BS x4  Extremities: WWP; 2+ peripheral pulses B/L; no LE edema  Skin: normal color and turgor; no rash  Neurological:     MEDICATIONS:  MEDICATIONS  (STANDING):  dextrose 5%. 1000 milliLiter(s) (50 mL/Hr) IV Continuous <Continuous>  dextrose 50% Injectable 12.5 Gram(s) IV Push once  insulin lispro (HumaLOG) corrective regimen sliding scale   SubCutaneous Before meals and at bedtime  norepinephrine Infusion 0.01 MICROgram(s)/kG/Min (1.275 mL/Hr) IV Continuous <Continuous>  pantoprazole  Injectable 40 milliGRAM(s) IV Push daily  piperacillin/tazobactam IVPB. 3.375 Gram(s) IV Intermittent every 6 hours  sodium chloride 0.9%. 1000 milliLiter(s) (100 mL/Hr) IV Continuous <Continuous>    MEDICATIONS  (PRN):  dextrose Gel 1 Dose(s) Oral once PRN Blood Glucose LESS THAN 70 milliGRAM(s)/deciliter  glucagon  Injectable 1 milliGRAM(s) IntraMuscular once PRN Glucose LESS THAN 70 milligrams/deciliter  traMADol 25 milliGRAM(s) Oral every 4 hours PRN Moderate Pain (4 - 6)      ALLERGIES:  Allergies    No Known Drug Allergies  Seafood (Swelling; Rash)    Intolerances        LABS:                        10.8   13.8  )-----------( 168      ( 20 Mar 2018 06:19 )             32.5     -    138  |  105  |  9   ----------------------------<  240<H>  4.0   |  25  |  0.80    Ca    7.9<L>      20 Mar 2018 06:19  Phos  3.3     -  Mg     3.1         TPro  6.6  /  Alb  2.6<L>  /  TBili  3.1<H>  /  DBili  x   /  AST  147<H>  /  ALT  84<H>  /  AlkPhos  264<H>  20    LIVER FUNCTIONS - ( 20 Mar 2018 06:19 )  Alb: 2.6 g/dL / Pro: 6.6 g/dL / ALK PHOS: 264 U/L / ALT: 84 U/L / AST: 147 U/L / GGT: x           PT/INR - ( 20 Mar 2018 00:22 )   PT: 14.0 sec;   INR: 1.26          PTT - ( 20 Mar 2018 00:22 )  PTT:21.6 sec  Urinalysis Basic - ( 20 Mar 2018 02:38 )    Color: Yellow / Appearance: SL Cloudy / S.010 / pH: x  Gluc: x / Ketone: Trace mg/dL  / Bili: Small / Urobili: 1.0 E.U./dL   Blood: x / Protein: Trace mg/dL / Nitrite: NEGATIVE   Leuk Esterase: NEGATIVE / RBC: < 5 /HPF / WBC < 5 /HPF   Sq Epi: x / Non Sq Epi: 0-5 /HPF / Bacteria: Present /HPF      CARDIAC MARKERS ( 20 Mar 2018 00:22 )  x     / <0.01 ng/mL / 120 U/L / x     / 1.8 ng/mL        RADIOLOGY & ADDITIONAL TESTS: Reviewed.    ASSESSMENT: 52F with PMH DM, intermittent hypotension presents as transfer from [facility] with chronic epigastric pain, poor appetite, nausea, and vomiting, concern for severe GERD, ulceration, or malignancy    GI  #Epigastric pain  - Concern for broad differential including cholangitis, GERD, hiatal hernia, esophageal stricture/dysmotility. At present in setting of meeting sepsis criteria and clinical symptoms, cholangitis/choledocholithiasis must be ruled out   - GI on board and appreciate recs  - S/p ERCP attempt this morning, unable to cannulate due to anatomy. Two stones were removed in sphincterotomy, as well as pus  - GI will re-attempt ERCP tomorrow  - F/u H. pylori stool antigen   - Maintain active T&S   - Zosyn for empiric coverage of presumed intra-abdominal infection, day 1   - f/u Bcx   - Trend CMP  - Protonix 40mg IV qd  - Clear liquid diet with NPO after midnight in setting of planned ERCP tomorrow    Pulm  #Throat pain   - RVP negative, rapid strep negative  - AM CXR with possible small bilateral pleural effusions    Endocrine  - On home metformin, 500mg, held in favor of sliding scale insulin  - A1C 10.6%  - Will require diabetic education and counseling  - F/u FSG and adjust Lantus as per SSI coverage  - TSH WNL    RENAL  - Increased urinary frequency without dysuria or incontinence  - UA negative for UTI    FEN:  F: IVF NS at 100cc/hr   E: Replete K<4 Mg<2  N: NPO    Line: R IJ    FULL CODE  Dispo: MICU INTERVAL HPI/OVERNIGHT EVENTS: Admission to MICU    SUBJECTIVE: Patient seen and examined at bedside. Examined post ERCP, as patient was in procedure this morning. Patient states that she feels much better, no acute complaints. Denies nausea, emesis, chest pain, shortness of breath, abdominal pain    OBJECTIVE:    VITAL SIGNS:  ICU Vital Signs Last 24 Hrs  T(C): 36.2 (20 Mar 2018 14:00), Max: 37.9 (19 Mar 2018 23:51)  T(F): 97.1 (20 Mar 2018 14:00), Max: 100.2 (19 Mar 2018 23:51)  HR: 66 (20 Mar 2018 13:00) (63 - 98)  BP: 87/52 (20 Mar 2018 12:00) (87/52 - 109/56)  BP(mean): 67 (20 Mar 2018 12:00) (67 - 91)  ABP: 93/50 (20 Mar 2018 13:00) (93/50 - 102/56)  ABP(mean): 66 (20 Mar 2018 13:00) (66 - 74)  RR: 18 (20 Mar 2018 13:00) (13 - 32)  SpO2: 97% (20 Mar 2018 13:00) (93% - 98%)         @ 07:  -   @ 07:00  --------------------------------------------------------  IN: 1962 mL / OUT: 1500 mL / NET: 462 mL     @ 07: @ 14:32  --------------------------------------------------------  IN: 25 mL / OUT: 600 mL / NET: -575 mL      CAPILLARY BLOOD GLUCOSE  209 (20 Mar 2018 12:00)      POCT Blood Glucose.: 207 mg/dL (20 Mar 2018 11:01)      PHYSICAL EXAM:    General: WDWN F in NAD sitting comfortably on chair  HEENT: NC/AT; PERRL  Neck: supple, no JVD appreciated  Respiratory: CTA B/L; no W/R/R  Cardiovascular: +S1/S2; regular rate, no M/R/G  Gastrointestinal: soft, nontender to palpation in all four quadrants, nondistended, no rebound or guarding present  Extremities: WWP; 2+ peripheral pulses B/L; no LE edema  Skin: normal color and turgor; no rash  Neurological: alert and oriented x3, no focal deficits appreciated    MEDICATIONS:  MEDICATIONS  (STANDING):  dextrose 5%. 1000 milliLiter(s) (50 mL/Hr) IV Continuous <Continuous>  dextrose 50% Injectable 12.5 Gram(s) IV Push once  insulin lispro (HumaLOG) corrective regimen sliding scale   SubCutaneous Before meals and at bedtime  norepinephrine Infusion 0.01 MICROgram(s)/kG/Min (1.275 mL/Hr) IV Continuous <Continuous>  pantoprazole  Injectable 40 milliGRAM(s) IV Push daily  piperacillin/tazobactam IVPB. 3.375 Gram(s) IV Intermittent every 6 hours  sodium chloride 0.9%. 1000 milliLiter(s) (100 mL/Hr) IV Continuous <Continuous>    MEDICATIONS  (PRN):  dextrose Gel 1 Dose(s) Oral once PRN Blood Glucose LESS THAN 70 milliGRAM(s)/deciliter  glucagon  Injectable 1 milliGRAM(s) IntraMuscular once PRN Glucose LESS THAN 70 milligrams/deciliter  traMADol 25 milliGRAM(s) Oral every 4 hours PRN Moderate Pain (4 - 6)      ALLERGIES:  Allergies    No Known Drug Allergies  Seafood (Swelling; Rash)    Intolerances        LABS:                        10.8   13.8  )-----------( 168      ( 20 Mar 2018 06:19 )             32.5     -20    138  |  105  |  9   ----------------------------<  240<H>  4.0   |  25  |  0.80    Ca    7.9<L>      20 Mar 2018 06:19  Phos  3.3     -20  Mg     3.1         TPro  6.6  /  Alb  2.6<L>  /  TBili  3.1<H>  /  DBili  x   /  AST  147<H>  /  ALT  84<H>  /  AlkPhos  264<H>      LIVER FUNCTIONS - ( 20 Mar 2018 06:19 )  Alb: 2.6 g/dL / Pro: 6.6 g/dL / ALK PHOS: 264 U/L / ALT: 84 U/L / AST: 147 U/L / GGT: x           PT/INR - ( 20 Mar 2018 00:22 )   PT: 14.0 sec;   INR: 1.26          PTT - ( 20 Mar 2018 00:22 )  PTT:21.6 sec  Urinalysis Basic - ( 20 Mar 2018 02:38 )    Color: Yellow / Appearance: SL Cloudy / S.010 / pH: x  Gluc: x / Ketone: Trace mg/dL  / Bili: Small / Urobili: 1.0 E.U./dL   Blood: x / Protein: Trace mg/dL / Nitrite: NEGATIVE   Leuk Esterase: NEGATIVE / RBC: < 5 /HPF / WBC < 5 /HPF   Sq Epi: x / Non Sq Epi: 0-5 /HPF / Bacteria: Present /HPF      CARDIAC MARKERS ( 20 Mar 2018 00:22 )  x     / <0.01 ng/mL / 120 U/L / x     / 1.8 ng/mL        RADIOLOGY & ADDITIONAL TESTS: Reviewed.    ASSESSMENT: 52F with PMH DM, intermittent hypotension presents as transfer from [facility] with chronic epigastric pain, poor appetite, nausea, and vomiting, concern for severe GERD, ulceration, or malignancy    GI  #Epigastric pain  - Concern for broad differential including cholangitis, GERD, hiatal hernia, esophageal stricture/dysmotility. At present in setting of meeting sepsis criteria and clinical symptoms, cholangitis/choledocholithiasis must be ruled out   - GI on board and appreciate recs  - S/p ERCP attempt this morning, unable to cannulate due to anatomy. Two stones were removed in sphincterotomy, as well as pus  - GI will re-attempt ERCP tomorrow  - F/u H. pylori stool antigen   - Maintain active T&S   - Zosyn for empiric coverage of presumed intra-abdominal infection, day 1   - f/u Bcx   - Trend CMP  - Protonix 40mg IV qd  - Clear liquid diet with NPO after midnight in setting of planned ERCP tomorrow    Pulm  #Throat pain   - RVP negative, rapid strep negative  - AM CXR with possible small bilateral pleural effusions    Endocrine  - On home metformin, 500mg, held in favor of sliding scale insulin  - A1C 10.6%  - Will require diabetic education and counseling  - F/u FSG and adjust Lantus as per SSI coverage  - TSH WNL    RENAL  - Increased urinary frequency without dysuria or incontinence  - UA negative for UTI    FEN:  F: IVF NS at 100cc/hr   E: Replete K<4 Mg<2  N: NPO    Line: R IJ    FULL CODE  Dispo: MICU

## 2018-03-20 NOTE — CONSULT NOTE ADULT - ASSESSMENT
51 YO F w/ h/o DM and intermittent hypotension of unknown etiology (never been worked up by a cardiologist) presented to [place] after being found unconscious found to have elevated LFTs with septic shock 2/2 cholangitis 2/2 choledocholithiasis.     # Septic shock 2/2 cholangitis 2/2 choledocholithiasis  - Lab work and imaging at [place] reviewed  - Pt with bandemia and initial tachycardia and is currently requiring levophed, however, has decreased since transfer to [facility]  - C/w Zosyn  - Follow-up initial blood cultures at [place]  - Will plan for EGD/ERCP this morning to assess for choledocholithiasis and dysphagia  - C/w NPO  - Monitor daily LFTs  - Further plan pending ERCP    Case d/w Dr. Yonatan LENZ will follow

## 2018-03-21 LAB
ALBUMIN SERPL ELPH-MCNC: 2.5 G/DL — LOW (ref 3.3–5)
ALP SERPL-CCNC: 285 U/L — HIGH (ref 40–120)
ALT FLD-CCNC: 95 U/L — HIGH (ref 10–45)
ANION GAP SERPL CALC-SCNC: 10 MMOL/L — SIGNIFICANT CHANGE UP (ref 5–17)
ANION GAP SERPL CALC-SCNC: 13 MMOL/L — SIGNIFICANT CHANGE UP (ref 5–17)
ANION GAP SERPL CALC-SCNC: 8 MMOL/L — SIGNIFICANT CHANGE UP (ref 5–17)
APTT BLD: 35.7 SEC — SIGNIFICANT CHANGE UP (ref 27.5–37.4)
AST SERPL-CCNC: 184 U/L — HIGH (ref 10–40)
BILIRUB SERPL-MCNC: 2.5 MG/DL — HIGH (ref 0.2–1.2)
BUN SERPL-MCNC: 5 MG/DL — LOW (ref 7–23)
BUN SERPL-MCNC: 6 MG/DL — LOW (ref 7–23)
BUN SERPL-MCNC: 6 MG/DL — LOW (ref 7–23)
CALCIUM SERPL-MCNC: 8 MG/DL — LOW (ref 8.4–10.5)
CALCIUM SERPL-MCNC: 8.2 MG/DL — LOW (ref 8.4–10.5)
CALCIUM SERPL-MCNC: 8.3 MG/DL — LOW (ref 8.4–10.5)
CHLORIDE SERPL-SCNC: 114 MMOL/L — HIGH (ref 96–108)
CHLORIDE SERPL-SCNC: 114 MMOL/L — HIGH (ref 96–108)
CHLORIDE SERPL-SCNC: 116 MMOL/L — HIGH (ref 96–108)
CO2 SERPL-SCNC: 22 MMOL/L — SIGNIFICANT CHANGE UP (ref 22–31)
CO2 SERPL-SCNC: 24 MMOL/L — SIGNIFICANT CHANGE UP (ref 22–31)
CO2 SERPL-SCNC: 25 MMOL/L — SIGNIFICANT CHANGE UP (ref 22–31)
CREAT SERPL-MCNC: 0.6 MG/DL — SIGNIFICANT CHANGE UP (ref 0.5–1.3)
CREAT SERPL-MCNC: 0.65 MG/DL — SIGNIFICANT CHANGE UP (ref 0.5–1.3)
CREAT SERPL-MCNC: 0.69 MG/DL — SIGNIFICANT CHANGE UP (ref 0.5–1.3)
GLUCOSE BLDC GLUCOMTR-MCNC: 161 MG/DL — HIGH (ref 70–99)
GLUCOSE BLDC GLUCOMTR-MCNC: 199 MG/DL — HIGH (ref 70–99)
GLUCOSE BLDC GLUCOMTR-MCNC: 200 MG/DL — HIGH (ref 70–99)
GLUCOSE BLDC GLUCOMTR-MCNC: 308 MG/DL — HIGH (ref 70–99)
GLUCOSE SERPL-MCNC: 179 MG/DL — HIGH (ref 70–99)
GLUCOSE SERPL-MCNC: 202 MG/DL — HIGH (ref 70–99)
GLUCOSE SERPL-MCNC: 248 MG/DL — HIGH (ref 70–99)
HCT VFR BLD CALC: 32.2 % — LOW (ref 34.5–45)
HGB BLD-MCNC: 10.7 G/DL — LOW (ref 11.5–15.5)
INR BLD: 1.26 — HIGH (ref 0.88–1.16)
MAGNESIUM SERPL-MCNC: 2.3 MG/DL — SIGNIFICANT CHANGE UP (ref 1.6–2.6)
MCHC RBC-ENTMCNC: 30.5 PG — SIGNIFICANT CHANGE UP (ref 27–34)
MCHC RBC-ENTMCNC: 33.2 G/DL — SIGNIFICANT CHANGE UP (ref 32–36)
MCV RBC AUTO: 91.7 FL — SIGNIFICANT CHANGE UP (ref 80–100)
PLATELET # BLD AUTO: 162 K/UL — SIGNIFICANT CHANGE UP (ref 150–400)
POTASSIUM SERPL-MCNC: 3.8 MMOL/L — SIGNIFICANT CHANGE UP (ref 3.5–5.3)
POTASSIUM SERPL-MCNC: 3.9 MMOL/L — SIGNIFICANT CHANGE UP (ref 3.5–5.3)
POTASSIUM SERPL-MCNC: 4 MMOL/L — SIGNIFICANT CHANGE UP (ref 3.5–5.3)
POTASSIUM SERPL-SCNC: 3.8 MMOL/L — SIGNIFICANT CHANGE UP (ref 3.5–5.3)
POTASSIUM SERPL-SCNC: 3.9 MMOL/L — SIGNIFICANT CHANGE UP (ref 3.5–5.3)
POTASSIUM SERPL-SCNC: 4 MMOL/L — SIGNIFICANT CHANGE UP (ref 3.5–5.3)
PROT SERPL-MCNC: 6.5 G/DL — SIGNIFICANT CHANGE UP (ref 6–8.3)
PROTHROM AB SERPL-ACNC: 14 SEC — HIGH (ref 9.8–12.7)
RBC # BLD: 3.51 M/UL — LOW (ref 3.8–5.2)
RBC # FLD: 14.2 % — SIGNIFICANT CHANGE UP (ref 10.3–16.9)
SODIUM SERPL-SCNC: 147 MMOL/L — HIGH (ref 135–145)
SODIUM SERPL-SCNC: 149 MMOL/L — HIGH (ref 135–145)
SODIUM SERPL-SCNC: 150 MMOL/L — HIGH (ref 135–145)
WBC # BLD: 11.1 K/UL — HIGH (ref 3.8–10.5)
WBC # FLD AUTO: 11.1 K/UL — HIGH (ref 3.8–10.5)

## 2018-03-21 PROCEDURE — 43274 ERCP DUCT STENT PLACEMENT: CPT

## 2018-03-21 PROCEDURE — 43262 ENDO CHOLANGIOPANCREATOGRAPH: CPT

## 2018-03-21 PROCEDURE — 99233 SBSQ HOSP IP/OBS HIGH 50: CPT | Mod: GC

## 2018-03-21 PROCEDURE — 43264 ERCP REMOVE DUCT CALCULI: CPT

## 2018-03-21 RX ORDER — INSULIN LISPRO 100/ML
VIAL (ML) SUBCUTANEOUS EVERY 4 HOURS
Qty: 0 | Refills: 0 | Status: DISCONTINUED | OUTPATIENT
Start: 2018-03-21 | End: 2018-03-22

## 2018-03-21 RX ORDER — ACETAMINOPHEN 500 MG
650 TABLET ORAL ONCE
Qty: 0 | Refills: 0 | Status: COMPLETED | OUTPATIENT
Start: 2018-03-21 | End: 2018-03-21

## 2018-03-21 RX ORDER — INSULIN GLARGINE 100 [IU]/ML
12 INJECTION, SOLUTION SUBCUTANEOUS AT BEDTIME
Qty: 0 | Refills: 0 | Status: DISCONTINUED | OUTPATIENT
Start: 2018-03-21 | End: 2018-03-22

## 2018-03-21 RX ORDER — INSULIN GLARGINE 100 [IU]/ML
5 INJECTION, SOLUTION SUBCUTANEOUS AT BEDTIME
Qty: 0 | Refills: 0 | Status: DISCONTINUED | OUTPATIENT
Start: 2018-03-21 | End: 2018-03-21

## 2018-03-21 RX ORDER — SODIUM CHLORIDE 9 MG/ML
1000 INJECTION, SOLUTION INTRAVENOUS
Qty: 0 | Refills: 0 | Status: DISCONTINUED | OUTPATIENT
Start: 2018-03-21 | End: 2018-03-21

## 2018-03-21 RX ORDER — POTASSIUM CHLORIDE 20 MEQ
10 PACKET (EA) ORAL ONCE
Qty: 0 | Refills: 0 | Status: COMPLETED | OUTPATIENT
Start: 2018-03-21 | End: 2018-03-21

## 2018-03-21 RX ADMIN — PIPERACILLIN AND TAZOBACTAM 200 GRAM(S): 4; .5 INJECTION, POWDER, LYOPHILIZED, FOR SOLUTION INTRAVENOUS at 11:15

## 2018-03-21 RX ADMIN — HEPARIN SODIUM 5000 UNIT(S): 5000 INJECTION INTRAVENOUS; SUBCUTANEOUS at 23:01

## 2018-03-21 RX ADMIN — Medication 2: at 18:57

## 2018-03-21 RX ADMIN — Medication 8: at 23:01

## 2018-03-21 RX ADMIN — PIPERACILLIN AND TAZOBACTAM 200 GRAM(S): 4; .5 INJECTION, POWDER, LYOPHILIZED, FOR SOLUTION INTRAVENOUS at 19:01

## 2018-03-21 RX ADMIN — Medication 2: at 10:48

## 2018-03-21 RX ADMIN — SODIUM CHLORIDE 200 MILLILITER(S): 9 INJECTION, SOLUTION INTRAVENOUS at 09:02

## 2018-03-21 RX ADMIN — Medication 2: at 06:39

## 2018-03-21 RX ADMIN — Medication 100 MILLIEQUIVALENT(S): at 09:06

## 2018-03-21 RX ADMIN — PIPERACILLIN AND TAZOBACTAM 200 GRAM(S): 4; .5 INJECTION, POWDER, LYOPHILIZED, FOR SOLUTION INTRAVENOUS at 23:01

## 2018-03-21 RX ADMIN — PANTOPRAZOLE SODIUM 40 MILLIGRAM(S): 20 TABLET, DELAYED RELEASE ORAL at 11:15

## 2018-03-21 RX ADMIN — PIPERACILLIN AND TAZOBACTAM 200 GRAM(S): 4; .5 INJECTION, POWDER, LYOPHILIZED, FOR SOLUTION INTRAVENOUS at 06:39

## 2018-03-21 NOTE — DIETITIAN INITIAL EVALUATION ADULT. - OTHER INFO
52F with PMH DM, intermittent hypotension presents as transfer from [facility] with chronic epigastric pain, poor appetite, nausea, and vomiting with gram negative bacteremia and concern for cholangitis. Pt reports appetite has been poor PTA. Drinking mostly liquids and no solid foods due to medical condition. Reports a 5-8# wt loss in the past few months. Vomiting PTA, however resolved since being admitted.

## 2018-03-21 NOTE — PROGRESS NOTE ADULT - SUBJECTIVE AND OBJECTIVE BOX
INTERVAL HPI/OVERNIGHT EVENTS:    OVERNIGHT:   SUBJECTIVE: Patient seen and examined at bedside.     ROS:  CV: Denies chest pain  Resp: Denies SOB  GI: Denies abdominal pain, constipation, diarrhea, nausea, vomiting  : Denies dysuria  ID: Denies fevers, chills  MSK: Denies joint pain     OBJECTIVE:    VITAL SIGNS:  ICU Vital Signs Last 24 Hrs  T(C): 37.8 (21 Mar 2018 01:22), Max: 37.8 (21 Mar 2018 01:22)  T(F): 100 (21 Mar 2018 01:22), Max: 100 (21 Mar 2018 01:22)  HR: 82 (21 Mar 2018 04:00) (63 - 104)  BP: 94/43 (21 Mar 2018 04:00) (87/52 - 107/59)  BP(mean): 69 (21 Mar 2018 04:00) (67 - 80)  ABP: 94/48 (21 Mar 2018 04:00) (88/46 - 136/64)  ABP(mean): 66 (21 Mar 2018 04:00) (62 - 88)  RR: 21 (21 Mar 2018 04:00) (7 - 32)  SpO2: 97% (21 Mar 2018 04:00) (91% - 99%)         @ 07:  -   @ 07:00  --------------------------------------------------------  IN: 1962 mL / OUT: 1500 mL / NET: 462 mL     @ 07: @ 06:33  --------------------------------------------------------  IN: 1819 mL / OUT: 2650 mL / NET: -831 mL      CAPILLARY BLOOD GLUCOSE  261 (20 Mar 2018 22:00)      POCT Blood Glucose.: 261 mg/dL (20 Mar 2018 22:51)      PHYSICAL EXAM:    General: NAD, comfortable  HEENT: NCAT, PERRL, clear conjunctiva, no scleral icterus. MM dry.   Neck: supple, no JVD. Full with no discrete LAD, tender to palpation. L. sided IJ in place with no surrounding erythema or edema.   Respiratory: Breathing comfortably on 2 L NC. Bibasilar decreased breath sounds and fine inspiratory crackles. No wheezing, rales  Cardiovascular: RRR, normal S1S2, no M/R/G  Abdomen: obese, soft, NT/ND, bowel sounds in all four quadrants, no palpable masses  Extremities: WWP, no clubbing, cyanosis, or edema  Neuro: Alert and oriented. Moving all four extremities spontaneously. No focal deficits.   Lines: L. IJ, L a-line, L AC peripheral IV.     MEDICATIONS:  MEDICATIONS  (STANDING):  dextrose 5%. 1000 milliLiter(s) (50 mL/Hr) IV Continuous <Continuous>  dextrose 50% Injectable 12.5 Gram(s) IV Push once  heparin  Injectable 5000 Unit(s) SubCutaneous every 8 hours  insulin lispro (HumaLOG) corrective regimen sliding scale   SubCutaneous Before meals and at bedtime  norepinephrine Infusion 0.01 MICROgram(s)/kG/Min (1.275 mL/Hr) IV Continuous <Continuous>  pantoprazole  Injectable 40 milliGRAM(s) IV Push daily  piperacillin/tazobactam IVPB. 3.375 Gram(s) IV Intermittent every 6 hours  sodium chloride 0.9%. 1000 milliLiter(s) (100 mL/Hr) IV Continuous <Continuous>    MEDICATIONS  (PRN):  dextrose Gel 1 Dose(s) Oral once PRN Blood Glucose LESS THAN 70 milliGRAM(s)/deciliter  glucagon  Injectable 1 milliGRAM(s) IntraMuscular once PRN Glucose LESS THAN 70 milligrams/deciliter      ALLERGIES:  Allergies    No Known Drug Allergies  Seafood (Swelling; Rash)    Intolerances        LABS:                        10.7   11.1  )-----------( 162      ( 21 Mar 2018 05:20 )             32.2     03-21    147<H>  |  114<H>  |  6<L>  ----------------------------<  179<H>  3.8   |  25  |  0.69    Ca    8.0<L>      21 Mar 2018 05:25  Phos  3.3     03-20  Mg     2.3     03-21    TPro  6.5  /  Alb  2.5<L>  /  TBili  2.5<H>  /  DBili  x   /  AST  184<H>  /  ALT  95<H>  /  AlkPhos  285<H>  03-21    PT/INR - ( 21 Mar 2018 05:20 )   PT: 14.0 sec;   INR: 1.26          PTT - ( 21 Mar 2018 05:20 )  PTT:35.7 sec  Urinalysis Basic - ( 20 Mar 2018 02:38 )    Color: Yellow / Appearance: SL Cloudy / S.010 / pH: x  Gluc: x / Ketone: Trace mg/dL  / Bili: Small / Urobili: 1.0 E.U./dL   Blood: x / Protein: Trace mg/dL / Nitrite: NEGATIVE   Leuk Esterase: NEGATIVE / RBC: < 5 /HPF / WBC < 5 /HPF   Sq Epi: x / Non Sq Epi: 0-5 /HPF / Bacteria: Present /HPF        RADIOLOGY & ADDITIONAL TESTS: Reviewed. INTERVAL HPI/OVERNIGHT EVENTS:    OVERNIGHT: No acute events.  SUBJECTIVE: Patient seen and examined at bedside. Reports chills and urinary frequency overnight. States that presenting symptoms n/v abdominal pain have resolved. She is very thirsty but has been NPO after midnight for ERCP today. Last BM yesterday, normal. Patient believes she is passing flatus. Reports throat pain unchanged from admission.     ROS:  CV: Denies chest pain  Resp: Denies SOB  GI: Denies abdominal pain, constipation, diarrhea, nausea, vomiting  : Denies dysuria  ID: Denies fevers, + chills as above   MSK: Denies joint pain     OBJECTIVE:    VITAL SIGNS:  ICU Vital Signs Last 24 Hrs  T(C): 37.8 (21 Mar 2018 01:22), Max: 37.8 (21 Mar 2018 01:22)  T(F): 100 (21 Mar 2018 01:22), Max: 100 (21 Mar 2018 01:22)  HR: 82 (21 Mar 2018 04:00) (63 - 104)  BP: 94/43 (21 Mar 2018 04:00) (87/52 - 107/59)  BP(mean): 69 (21 Mar 2018 04:00) (67 - 80)  ABP: 94/48 (21 Mar 2018 04:00) (88/46 - 136/64)  ABP(mean): 66 (21 Mar 2018 04:00) (62 - 88)  RR: 21 (21 Mar 2018 04:00) (7 - 32)  SpO2: 97% (21 Mar 2018 04:00) (91% - 99%)         @ 07:  -   @ 07:00  --------------------------------------------------------  IN: 1962 mL / OUT: 1500 mL / NET: 462 mL     @ 07:  -   @ 06:33  --------------------------------------------------------  IN: 1819 mL / OUT: 2650 mL / NET: -831 mL      CAPILLARY BLOOD GLUCOSE  261 (20 Mar 2018 22:00)      POCT Blood Glucose.: 261 mg/dL (20 Mar 2018 22:51)      PHYSICAL EXAM:    General: NAD, comfortable  HEENT: NCAT, PERRL, clear conjunctiva, no scleral icterus. MM dry.   Neck: supple, no JVD. Full with no discrete LAD, tender to palpation. L. sided IJ in place with no surrounding erythema or edema.   Respiratory: Breathing comfortably on 2 L NC. Bibasilar decreased breath sounds and fine inspiratory crackles. No wheezing, rales  Cardiovascular: RRR, normal S1S2, no M/R/G  Abdomen: obese, soft, NT/ND, bowel sounds in all four quadrants, no palpable masses  Extremities: WWP, no clubbing, cyanosis, or edema  Neuro: Alert and oriented. Moving all four extremities spontaneously. No focal deficits.   Lines: L. IJ, L a-line, L AC peripheral IV.     MEDICATIONS:  MEDICATIONS  (STANDING):  dextrose 5%. 1000 milliLiter(s) (50 mL/Hr) IV Continuous <Continuous>  dextrose 50% Injectable 12.5 Gram(s) IV Push once  heparin  Injectable 5000 Unit(s) SubCutaneous every 8 hours  insulin lispro (HumaLOG) corrective regimen sliding scale   SubCutaneous Before meals and at bedtime  norepinephrine Infusion 0.01 MICROgram(s)/kG/Min (1.275 mL/Hr) IV Continuous <Continuous>  pantoprazole  Injectable 40 milliGRAM(s) IV Push daily  piperacillin/tazobactam IVPB. 3.375 Gram(s) IV Intermittent every 6 hours  sodium chloride 0.9%. 1000 milliLiter(s) (100 mL/Hr) IV Continuous <Continuous>    MEDICATIONS  (PRN):  dextrose Gel 1 Dose(s) Oral once PRN Blood Glucose LESS THAN 70 milliGRAM(s)/deciliter  glucagon  Injectable 1 milliGRAM(s) IntraMuscular once PRN Glucose LESS THAN 70 milligrams/deciliter      ALLERGIES:  Allergies    No Known Drug Allergies  Seafood (Swelling; Rash)    Intolerances        LABS:                        10.7   11.1  )-----------( 162      ( 21 Mar 2018 05:20 )             32.2     03-21    147<H>  |  114<H>  |  6<L>  ----------------------------<  179<H>  3.8   |  25  |  0.69    Ca    8.0<L>      21 Mar 2018 05:25  Phos  3.3       Mg     2.3         TPro  6.5  /  Alb  2.5<L>  /  TBili  2.5<H>  /  DBili  x   /  AST  184<H>  /  ALT  95<H>  /  AlkPhos  285<H>      PT/INR - ( 21 Mar 2018 05:20 )   PT: 14.0 sec;   INR: 1.26          PTT - ( 21 Mar 2018 05:20 )  PTT:35.7 sec  Urinalysis Basic - ( 20 Mar 2018 02:38 )    Color: Yellow / Appearance: SL Cloudy / S.010 / pH: x  Gluc: x / Ketone: Trace mg/dL  / Bili: Small / Urobili: 1.0 E.U./dL   Blood: x / Protein: Trace mg/dL / Nitrite: NEGATIVE   Leuk Esterase: NEGATIVE / RBC: < 5 /HPF / WBC < 5 /HPF   Sq Epi: x / Non Sq Epi: 0-5 /HPF / Bacteria: Present /HPF        RADIOLOGY & ADDITIONAL TESTS: Reviewed.

## 2018-03-21 NOTE — CONSULT NOTE ADULT - SUBJECTIVE AND OBJECTIVE BOX
52y F PMH DM, questionable hx of intermittent hypotension, presents to [facility] transferred from [facility] for ERCP/escalation of care.    Patient initially presented to [facility] after being found unconscious at home. She has 6mo hx of dull, non-radiating, epigastric pain with inability to swallow solid foods/+tolerates liquids and reduced appetite associated w/ n/v and 8-10 pound weight loss. No diarrhea/change in BM.     Painful lymph nodes in the neck bilaterally. Recent breast biopsy in Feb. found to be benign.     At [facility] patient with severe epigastric pain found to have elevated LFTs 214/112, lipase of 682, Tbili 2.8, alk phos 457, and CT AP choledoco w/ 3 small distal calculi in common bile duct with mild extrahepatic ductal dilation w/ CBD measure 1cm. Patient continued to decompensate, requiring pressors and lactic acidosis 7.6, transferred to [facility] for urgent ERCP. On Zosyn.     Surgery is called to evaluate for possible cholecystectomy in setting of resolving cholangitis s/p ERCP.   Yesterday - ERCP was performed - 2 stones were extracted and pus drained from CBD, GI was unable to cannulate. Today patient was taken back for repeat ERCP     On exam -     Vitals;     PAST MEDICAL & SURGICAL HISTORY:  Diabetes   delivery delivered      ROS: See HPI    MEDICATIONS  (STANDING):  acetaminophen  Suppository. 650 milliGRAM(s) Rectal once  dextrose 5%. 1000 milliLiter(s) (50 mL/Hr) IV Continuous <Continuous>  dextrose 50% Injectable 12.5 Gram(s) IV Push once  heparin  Injectable 5000 Unit(s) SubCutaneous every 8 hours  insulin lispro (HumaLOG) corrective regimen sliding scale   SubCutaneous every 4 hours  norepinephrine Infusion 0.01 MICROgram(s)/kG/Min (1.275 mL/Hr) IV Continuous <Continuous>  pantoprazole  Injectable 40 milliGRAM(s) IV Push daily  piperacillin/tazobactam IVPB. 3.375 Gram(s) IV Intermittent every 6 hours    MEDICATIONS  (PRN):  dextrose Gel 1 Dose(s) Oral once PRN Blood Glucose LESS THAN 70 milliGRAM(s)/deciliter  glucagon  Injectable 1 milliGRAM(s) IntraMuscular once PRN Glucose LESS THAN 70 milligrams/deciliter      Allergies    No Known Drug Allergies  Seafood (Swelling; Rash)      SOCIAL HISTORY:  Smoke: Never Smoker  EtOH: occasional    FAMILY HISTORY:  No pertinent family history in first degree relatives      Vital Signs Last 24 Hrs  T(C): 36.2 (21 Mar 2018 16:41), Max: 37.8 (21 Mar 2018 01:22)  T(F): 97.1 (21 Mar 2018 16:41), Max: 100 (21 Mar 2018 01:22)  HR: 76 (21 Mar 2018 13:00) (66 - 104)  BP: 94/43 (21 Mar 2018 04:00) (94/43 - 97/51)  BP(mean): 69 (21 Mar 2018 04:00) (69 - 70)  RR: 18 (21 Mar 2018 13:00) (7 - 23)  SpO2: 95% (21 Mar 2018 13:00) (91% - 99%)    PHYSICAL EXAM  Exam:   Neuro: AOX3, calm, NAD.   Gen: WD, WN, appropriately groomed.    HEENT: AT, NC  Neck: No JVD, Normal thyroid, NO carotid bruits bilaterally.   Res: Nml BS, CTAB, no wheezes/rhales or rhonchi.   CV: Normal HS, RRR, no MRG  Abd: soft, ND, NT, no masses or organomegaly appreciated  Musculoskeletal: sensation grossly intact, strength 5/5, FROM bilaterally   Pulses:                     R:      L:   Carotids: 2+ / 2+  Brachial: 2+ / 2+  Radial:    2+ / 2+  Femoral: 2+ / 2+  Popliteal: 2+ / 2+  Post Tib: 2+ / 2+  D. Pedis 2+ / 2+  Ankle Edema: No, right or left, mild/mod/severe  Varicose Veins: No, right or left, mild/mod/severe  Venous Stasis: No, right or left, mild/mod/severe  Wounds:       LABS:                        10.7   11.1  )-----------( 162      ( 21 Mar 2018 05:20 )             32.2     03-21    149<H>  |  114<H>  |  6<L>  ----------------------------<  248<H>  4.0   |  22  |  0.60    Ca    8.3<L>      21 Mar 2018 10:03  Phos  3.3     03-20  Mg     2.3     -    TPro  6.5  /  Alb  2.5<L>  /  TBili  2.5<H>  /  DBili  x   /  AST  184<H>  /  ALT  95<H>  /  AlkPhos  285<H>  03-21    PT/INR - ( 21 Mar 2018 05:20 )   PT: 14.0 sec;   INR: 1.26          PTT - ( 21 Mar 2018 05:20 )  PTT:35.7 sec  Urinalysis Basic - ( 20 Mar 2018 02:38 )    Color: Yellow / Appearance: SL Cloudy / S.010 / pH: x  Gluc: x / Ketone: Trace mg/dL  / Bili: Small / Urobili: 1.0 E.U./dL   Blood: x / Protein: Trace mg/dL / Nitrite: NEGATIVE   Leuk Esterase: NEGATIVE / RBC: < 5 /HPF / WBC < 5 /HPF   Sq Epi: x / Non Sq Epi: 0-5 /HPF / Bacteria: Present /HPF        Assessment and Plan:  52y F transferred from [place] to [facility] after being found unconscious, found to have elevated LFTs with septic shock 2/2 cholangitis 2/2 choledocholithiasis.= now s/p ERCP     - Discussed with chief on call and attending. 52y F PMH DM, questionable hx of intermittent hypotension, presents to [facility] transferred from [facility] for ERCP/escalation of care.    Patient initially presented to [facility] after being found unconscious at home, EMS reports FSBG >400, newly diagnosed DM since January (on Metformin). She has 6mo hx of dull, non-radiating, epigastric pain with inability to swallow solid food / +tolerates liquids and reduced appetite associated with N/V and 8-10 pound weight loss, chronic dry mouth and thirst. No diarrhea/change in BM.     Painful lymph nodes in axilla bilaterally. Recent biopsy in Feb. found to be benign.     At [facility] patient with severe epigastric pain found to have elevated LFTs 214/112, lipase of 682, Tbili 2.8, alk phos 457, and CT AP choledoco w/ 3 small distal calculi in common bile duct with mild extrahepatic ductal dilation w/ CBD measure 1cm. Patient continued to decompensate, requiring pressors and lactic acidosis 7.6, transferred to [facility] for urgent ERCP. On Zosyn.     Surgery is called to evaluate for possible cholecystectomy in setting of resolving cholangitis s/p ERCP.   Yesterday - ERCP was performed - 2 stones were extracted and pus drained from CBD, GI was unable to cannulate ampulla. Today patient was taken back for repeat ERCP, complete sphincterotomy was performed, stent placed - patient tolerated procedure.      On exam - patient resting comfortably, Afebrile, HD stable, no pressor support required.     ICU Vital Signs Last 24 Hrs  T(C): 36.2 (21 Mar 2018 16:41), Max: 37.8 (21 Mar 2018 01:22)  T(F): 97.1 (21 Mar 2018 16:41), Max: 100 (21 Mar 2018 01:22)  HR: 60 (21 Mar 2018 18:00) (56 - 104)  BP: 94/70 (21 Mar 2018 16:00) (94/43 - 97/51)  BP(mean): 83 (21 Mar 2018 16:00) (69 - 83)  ABP: 104/58 (21 Mar 2018 18:00) (88/46 - 142/88)  ABP(mean): 78 (21 Mar 2018 18:00) (62 - 110)  RR: 14 (21 Mar 2018 18:00) (7 - 23)  SpO2: 99% (21 Mar 2018 18:00) (91% - 99%)      PAST MEDICAL & SURGICAL HISTORY:  Diabetes   delivery delivered      ROS: See HPI    MEDICATIONS  (STANDING):  acetaminophen  Suppository. 650 milliGRAM(s) Rectal once  dextrose 5%. 1000 milliLiter(s) (50 mL/Hr) IV Continuous <Continuous>  dextrose 50% Injectable 12.5 Gram(s) IV Push once  heparin  Injectable 5000 Unit(s) SubCutaneous every 8 hours  insulin lispro (HumaLOG) corrective regimen sliding scale   SubCutaneous every 4 hours  norepinephrine Infusion 0.01 MICROgram(s)/kG/Min (1.275 mL/Hr) IV Continuous <Continuous>  pantoprazole  Injectable 40 milliGRAM(s) IV Push daily  piperacillin/tazobactam IVPB. 3.375 Gram(s) IV Intermittent every 6 hours    MEDICATIONS  (PRN):  dextrose Gel 1 Dose(s) Oral once PRN Blood Glucose LESS THAN 70 milliGRAM(s)/deciliter  glucagon  Injectable 1 milliGRAM(s) IntraMuscular once PRN Glucose LESS THAN 70 milligrams/deciliter      Allergies    No Known Drug Allergies  Seafood (Swelling; Rash)      SOCIAL HISTORY:  Smoke: Never Smoker  EtOH: occasional    FAMILY HISTORY:  No pertinent family history in first degree relatives      PHYSICAL EXAM  Neuro: AOX3, calm, NAD.   Gen: WD, WN, appropriately groomed.    HEENT: AT, NC  Neck: No JVD, Normal thyroid, NO carotid bruits bilaterally.   Res: Nml BS, CTAB, no wheezes/rhales or rhonchi.   CV: Normal HS, RRR, no MRG  Abd: soft, ND, NT, no masses or organomegaly appreciated  Musculoskeletal: sensation grossly intact, strength 5/5, FROM bilaterally   Pulses: 2+ radial, femoral, DP bilaterally       LABS:                        10.7   11.1  )-----------( 162      ( 21 Mar 2018 05:20 )             32.2     03-21    149<H>  |  114<H>  |  6<L>  ----------------------------<  248<H>  4.0   |  22  |  0.60    Ca    8.3<L>      21 Mar 2018 10:03  Phos  3.3     03-20  Mg     2.3     -    TPro  6.5  /  Alb  2.5<L>  /  TBili  2.5<H>  /  DBili  x   /  AST  184<H>  /  ALT  95<H>  /  AlkPhos  285<H>  03-    PT/INR - ( 21 Mar 2018 05:20 )   PT: 14.0 sec;   INR: 1.26          PTT - ( 21 Mar 2018 05:20 )  PTT:35.7 sec  Urinalysis Basic - ( 20 Mar 2018 02:38 )    Color: Yellow / Appearance: SL Cloudy / S.010 / pH: x  Gluc: x / Ketone: Trace mg/dL  / Bili: Small / Urobili: 1.0 E.U./dL   Blood: x / Protein: Trace mg/dL / Nitrite: NEGATIVE   Leuk Esterase: NEGATIVE / RBC: < 5 /HPF / WBC < 5 /HPF   Sq Epi: x / Non Sq Epi: 0-5 /HPF / Bacteria: Present /HPF        Assessment and Plan:  52y F transferred from [place] to [facility] after being found unconscious, found to have elevated LFTs with septic shock 2/2 cholangitis 2/2 choledocholithiasis.= now s/p ERCP stone removal, CBD stenting.    - Patient currently afebrile, HD stable following repeat ERCP, continue to medically optimized and monitor patient post-procedure for signs/symptoms of sepsis.   - continue to monitor daily CMP, lipase.   - Surgery continue to follow - planning for laparoscopic cholecystectomy towards the end of the week.   - Discussed with chief on call and attending.

## 2018-03-21 NOTE — DIETITIAN INITIAL EVALUATION ADULT. - ENERGY NEEDS
48 kg IBW; 68 kg ABW; BMI 28; 155 cm; 142% of IBW.  IBW used as pt > 120% of IBW. Protein needs increased for sepsis.

## 2018-03-22 LAB
-  AMPICILLIN/SULBACTAM: SIGNIFICANT CHANGE UP
-  AMPICILLIN: SIGNIFICANT CHANGE UP
-  CEFAZOLIN: SIGNIFICANT CHANGE UP
-  CEFTRIAXONE: SIGNIFICANT CHANGE UP
-  CIPROFLOXACIN: SIGNIFICANT CHANGE UP
-  GENTAMICIN: SIGNIFICANT CHANGE UP
-  PIPERACILLIN/TAZOBACTAM: SIGNIFICANT CHANGE UP
-  TOBRAMYCIN: SIGNIFICANT CHANGE UP
-  TRIMETHOPRIM/SULFAMETHOXAZOLE: SIGNIFICANT CHANGE UP
ALBUMIN SERPL ELPH-MCNC: 2.7 G/DL — LOW (ref 3.3–5)
ALP SERPL-CCNC: 322 U/L — HIGH (ref 40–120)
ALT FLD-CCNC: 75 U/L — HIGH (ref 10–45)
ANION GAP SERPL CALC-SCNC: 14 MMOL/L — SIGNIFICANT CHANGE UP (ref 5–17)
AST SERPL-CCNC: 102 U/L — HIGH (ref 10–40)
BILIRUB SERPL-MCNC: 0.9 MG/DL — SIGNIFICANT CHANGE UP (ref 0.2–1.2)
BLD GP AB SCN SERPL QL: NEGATIVE — SIGNIFICANT CHANGE UP
BUN SERPL-MCNC: 9 MG/DL — SIGNIFICANT CHANGE UP (ref 7–23)
CALCIUM SERPL-MCNC: 8.3 MG/DL — LOW (ref 8.4–10.5)
CHLORIDE SERPL-SCNC: 110 MMOL/L — HIGH (ref 96–108)
CO2 SERPL-SCNC: 22 MMOL/L — SIGNIFICANT CHANGE UP (ref 22–31)
CREAT SERPL-MCNC: 0.53 MG/DL — SIGNIFICANT CHANGE UP (ref 0.5–1.3)
GLUCOSE BLDC GLUCOMTR-MCNC: 155 MG/DL — HIGH (ref 70–99)
GLUCOSE BLDC GLUCOMTR-MCNC: 184 MG/DL — HIGH (ref 70–99)
GLUCOSE BLDC GLUCOMTR-MCNC: 200 MG/DL — HIGH (ref 70–99)
GLUCOSE BLDC GLUCOMTR-MCNC: 206 MG/DL — HIGH (ref 70–99)
GLUCOSE BLDC GLUCOMTR-MCNC: 213 MG/DL — HIGH (ref 70–99)
GLUCOSE BLDC GLUCOMTR-MCNC: 217 MG/DL — HIGH (ref 70–99)
GLUCOSE BLDC GLUCOMTR-MCNC: 222 MG/DL — HIGH (ref 70–99)
GLUCOSE BLDC GLUCOMTR-MCNC: 230 MG/DL — HIGH (ref 70–99)
GLUCOSE BLDC GLUCOMTR-MCNC: 292 MG/DL — HIGH (ref 70–99)
GLUCOSE SERPL-MCNC: 252 MG/DL — HIGH (ref 70–99)
GRAM STN FLD: SIGNIFICANT CHANGE UP
HCT VFR BLD CALC: 35.3 % — SIGNIFICANT CHANGE UP (ref 34.5–45)
HGB BLD-MCNC: 11.5 G/DL — SIGNIFICANT CHANGE UP (ref 11.5–15.5)
LIDOCAIN IGE QN: 12 U/L — SIGNIFICANT CHANGE UP (ref 7–60)
MAGNESIUM SERPL-MCNC: 2.2 MG/DL — SIGNIFICANT CHANGE UP (ref 1.6–2.6)
MCHC RBC-ENTMCNC: 30.7 PG — SIGNIFICANT CHANGE UP (ref 27–34)
MCHC RBC-ENTMCNC: 32.6 G/DL — SIGNIFICANT CHANGE UP (ref 32–36)
MCV RBC AUTO: 94.1 FL — SIGNIFICANT CHANGE UP (ref 80–100)
METHOD TYPE: SIGNIFICANT CHANGE UP
PHOSPHATE SERPL-MCNC: 4.6 MG/DL — HIGH (ref 2.5–4.5)
PLATELET # BLD AUTO: 184 K/UL — SIGNIFICANT CHANGE UP (ref 150–400)
POTASSIUM SERPL-MCNC: 4.4 MMOL/L — SIGNIFICANT CHANGE UP (ref 3.5–5.3)
POTASSIUM SERPL-SCNC: 4.4 MMOL/L — SIGNIFICANT CHANGE UP (ref 3.5–5.3)
PROT SERPL-MCNC: 7.3 G/DL — SIGNIFICANT CHANGE UP (ref 6–8.3)
RBC # BLD: 3.75 M/UL — LOW (ref 3.8–5.2)
RBC # FLD: 14.6 % — SIGNIFICANT CHANGE UP (ref 10.3–16.9)
RH IG SCN BLD-IMP: POSITIVE — SIGNIFICANT CHANGE UP
SODIUM SERPL-SCNC: 146 MMOL/L — HIGH (ref 135–145)
WBC # BLD: 8.1 K/UL — SIGNIFICANT CHANGE UP (ref 3.8–10.5)
WBC # FLD AUTO: 8.1 K/UL — SIGNIFICANT CHANGE UP (ref 3.8–10.5)

## 2018-03-22 PROCEDURE — 99221 1ST HOSP IP/OBS SF/LOW 40: CPT | Mod: GC

## 2018-03-22 PROCEDURE — 99233 SBSQ HOSP IP/OBS HIGH 50: CPT | Mod: GC

## 2018-03-22 RX ORDER — INSULIN LISPRO 100/ML
VIAL (ML) SUBCUTANEOUS
Qty: 0 | Refills: 0 | Status: DISCONTINUED | OUTPATIENT
Start: 2018-03-22 | End: 2018-03-24

## 2018-03-22 RX ORDER — INSULIN GLARGINE 100 [IU]/ML
20 INJECTION, SOLUTION SUBCUTANEOUS AT BEDTIME
Qty: 0 | Refills: 0 | Status: DISCONTINUED | OUTPATIENT
Start: 2018-03-22 | End: 2018-03-24

## 2018-03-22 RX ORDER — HUMAN INSULIN 100 [IU]/ML
6 INJECTION, SUSPENSION SUBCUTANEOUS ONCE
Qty: 0 | Refills: 0 | Status: COMPLETED | OUTPATIENT
Start: 2018-03-22 | End: 2018-03-22

## 2018-03-22 RX ORDER — SODIUM CHLORIDE 9 MG/ML
1000 INJECTION, SOLUTION INTRAVENOUS
Qty: 0 | Refills: 0 | Status: DISCONTINUED | OUTPATIENT
Start: 2018-03-22 | End: 2018-03-24

## 2018-03-22 RX ORDER — DEXTROSE 50 % IN WATER 50 %
12.5 SYRINGE (ML) INTRAVENOUS ONCE
Qty: 0 | Refills: 0 | Status: DISCONTINUED | OUTPATIENT
Start: 2018-03-22 | End: 2018-03-24

## 2018-03-22 RX ORDER — DEXTROSE 50 % IN WATER 50 %
1 SYRINGE (ML) INTRAVENOUS ONCE
Qty: 0 | Refills: 0 | Status: DISCONTINUED | OUTPATIENT
Start: 2018-03-22 | End: 2018-03-24

## 2018-03-22 RX ORDER — DEXTROSE 50 % IN WATER 50 %
25 SYRINGE (ML) INTRAVENOUS ONCE
Qty: 0 | Refills: 0 | Status: DISCONTINUED | OUTPATIENT
Start: 2018-03-22 | End: 2018-03-24

## 2018-03-22 RX ORDER — AMPICILLIN SODIUM AND SULBACTAM SODIUM 250; 125 MG/ML; MG/ML
3 INJECTION, POWDER, FOR SUSPENSION INTRAMUSCULAR; INTRAVENOUS EVERY 6 HOURS
Qty: 0 | Refills: 0 | Status: DISCONTINUED | OUTPATIENT
Start: 2018-03-22 | End: 2018-03-24

## 2018-03-22 RX ORDER — DEXTROSE 50 % IN WATER 50 %
25 SYRINGE (ML) INTRAVENOUS ONCE
Qty: 0 | Refills: 0 | Status: DISCONTINUED | OUTPATIENT
Start: 2018-03-22 | End: 2018-03-22

## 2018-03-22 RX ORDER — INSULIN LISPRO 100/ML
6 VIAL (ML) SUBCUTANEOUS
Qty: 0 | Refills: 0 | Status: DISCONTINUED | OUTPATIENT
Start: 2018-03-22 | End: 2018-03-24

## 2018-03-22 RX ORDER — GLUCAGON INJECTION, SOLUTION 0.5 MG/.1ML
1 INJECTION, SOLUTION SUBCUTANEOUS ONCE
Qty: 0 | Refills: 0 | Status: DISCONTINUED | OUTPATIENT
Start: 2018-03-22 | End: 2018-03-24

## 2018-03-22 RX ORDER — INSULIN HUMAN 100 [IU]/ML
2 INJECTION, SOLUTION SUBCUTANEOUS
Qty: 100 | Refills: 0 | Status: DISCONTINUED | OUTPATIENT
Start: 2018-03-22 | End: 2018-03-22

## 2018-03-22 RX ADMIN — Medication 6 UNIT(S): at 17:15

## 2018-03-22 RX ADMIN — INSULIN HUMAN 2 UNIT(S)/HR: 100 INJECTION, SOLUTION SUBCUTANEOUS at 08:39

## 2018-03-22 RX ADMIN — HEPARIN SODIUM 5000 UNIT(S): 5000 INJECTION INTRAVENOUS; SUBCUTANEOUS at 22:34

## 2018-03-22 RX ADMIN — INSULIN GLARGINE 12 UNIT(S): 100 INJECTION, SOLUTION SUBCUTANEOUS at 00:48

## 2018-03-22 RX ADMIN — PIPERACILLIN AND TAZOBACTAM 200 GRAM(S): 4; .5 INJECTION, POWDER, LYOPHILIZED, FOR SOLUTION INTRAVENOUS at 06:44

## 2018-03-22 RX ADMIN — Medication 6 UNIT(S): at 11:50

## 2018-03-22 RX ADMIN — Medication 2: at 17:14

## 2018-03-22 RX ADMIN — HUMAN INSULIN 6 UNIT(S): 100 INJECTION, SUSPENSION SUBCUTANEOUS at 11:45

## 2018-03-22 RX ADMIN — AMPICILLIN SODIUM AND SULBACTAM SODIUM 200 GRAM(S): 250; 125 INJECTION, POWDER, FOR SUSPENSION INTRAMUSCULAR; INTRAVENOUS at 17:43

## 2018-03-22 RX ADMIN — Medication 6: at 01:25

## 2018-03-22 RX ADMIN — PANTOPRAZOLE SODIUM 40 MILLIGRAM(S): 20 TABLET, DELAYED RELEASE ORAL at 12:49

## 2018-03-22 RX ADMIN — PIPERACILLIN AND TAZOBACTAM 200 GRAM(S): 4; .5 INJECTION, POWDER, LYOPHILIZED, FOR SOLUTION INTRAVENOUS at 12:49

## 2018-03-22 RX ADMIN — HEPARIN SODIUM 5000 UNIT(S): 5000 INJECTION INTRAVENOUS; SUBCUTANEOUS at 06:44

## 2018-03-22 RX ADMIN — INSULIN GLARGINE 20 UNIT(S): 100 INJECTION, SOLUTION SUBCUTANEOUS at 22:34

## 2018-03-22 RX ADMIN — Medication 4: at 11:51

## 2018-03-22 RX ADMIN — Medication 1: at 22:34

## 2018-03-22 RX ADMIN — HEPARIN SODIUM 5000 UNIT(S): 5000 INJECTION INTRAVENOUS; SUBCUTANEOUS at 14:37

## 2018-03-22 NOTE — PROGRESS NOTE ADULT - SUBJECTIVE AND OBJECTIVE BOX
Seen and examined at bedside. Placed on insulin gtt this am for persistent hyperglycemia. Pain well controlled. AMbulating.  Denies f/c/s/cp/sob/palp/lh.     Vital Signs Last 24 Hrs  T(C): 36.4 (22 Mar 2018 14:00), Max: 37 (21 Mar 2018 22:00)  T(F): 97.5 (22 Mar 2018 14:00), Max: 98.6 (21 Mar 2018 22:00)  HR: 56 (22 Mar 2018 14:01) (52 - 66)  BP: 95/59 (22 Mar 2018 09:00) (94/70 - 118/60)  BP(mean): 80 (22 Mar 2018 09:00) (80 - 88)  RR: 18 (22 Mar 2018 14:01) (13 - 22)  SpO2: 97% (22 Mar 2018 14:01) (94% - 99%)  CAPILLARY BLOOD GLUCOSE  200 (22 Mar 2018 14:00)  213 (22 Mar 2018 12:00)  222 (22 Mar 2018 10:00)  231 (22 Mar 2018 09:00)  217 (22 Mar 2018 06:00)  292 (22 Mar 2018 01:00)  308 (21 Mar 2018 22:00)  199 (21 Mar 2018 19:00)      POCT Blood Glucose.: 200 mg/dL (22 Mar 2018 14:35)  POCT Blood Glucose.: 213 mg/dL (22 Mar 2018 11:48)  POCT Blood Glucose.: 222 mg/dL (22 Mar 2018 10:15)  POCT Blood Glucose.: 230 mg/dL (22 Mar 2018 08:35)  POCT Blood Glucose.: 217 mg/dL (22 Mar 2018 06:28)  POCT Blood Glucose.: 292 mg/dL (22 Mar 2018 01:22)  POCT Blood Glucose.: 308 mg/dL (21 Mar 2018 22:53)  POCT Blood Glucose.: 199 mg/dL (21 Mar 2018 18:34)      03-21 @ 07:01  -  03-22 @ 07:00  --------------------------------------------------------  IN: 512 mL / OUT: 600 mL / NET: -88 mL    03-22 @ 07:01 - 03-22 @ 14:59  --------------------------------------------------------  IN: 504 mL / OUT: 0 mL / NET: 504 mL        dextrose 5%. 1000 milliLiter(s) IV Continuous <Continuous>  dextrose 50% Injectable 12.5 Gram(s) IV Push once  dextrose Gel 1 Dose(s) Oral once PRN  glucagon  Injectable 1 milliGRAM(s) IntraMuscular once PRN  heparin  Injectable 5000 Unit(s) SubCutaneous every 8 hours  insulin glargine Injectable (LANTUS) 20 Unit(s) SubCutaneous at bedtime  insulin lispro (HumaLOG) corrective regimen sliding scale   SubCutaneous every 4 hours  insulin lispro Injectable (HumaLOG) 6 Unit(s) SubCutaneous three times a day before meals  pantoprazole  Injectable 40 milliGRAM(s) IV Push daily  piperacillin/tazobactam IVPB. 3.375 Gram(s) IV Intermittent every 6 hours    LABS:      CBC Full  -  ( 22 Mar 2018 06:25 )  WBC Count : 8.1 K/uL  Hemoglobin : 11.5 g/dL  Hematocrit : 35.3 %  Platelet Count - Automated : 184 K/uL  Mean Cell Volume : 94.1 fL  Mean Cell Hemoglobin : 30.7 pg  Mean Cell Hemoglobin Concentration : 32.6 g/dL  Auto Neutrophil # : x  Auto Lymphocyte # : x  Auto Monocyte # : x  Auto Eosinophil # : x  Auto Basophil # : x  Auto Neutrophil % : x  Auto Lymphocyte % : x  Auto Monocyte % : x  Auto Eosinophil % : x  Auto Basophil % : x    03-22    146<H>  |  110<H>  |  9   ----------------------------<  252<H>  4.4   |  22  |  0.53    Ca    8.3<L>      22 Mar 2018 06:25  Phos  4.6     03-22  Mg     2.2     03-22    TPro  7.3  /  Alb  2.7<L>  /  TBili  0.9  /  DBili  x   /  AST  102<H>  /  ALT  75<H>  /  AlkPhos  322<H>  03-22    PT/INR - ( 21 Mar 2018 05:20 )   PT: 14.0 sec;   INR: 1.26          PTT - ( 21 Mar 2018 05:20 )  PTT:35.7 sec

## 2018-03-22 NOTE — PROGRESS NOTE ADULT - ASSESSMENT
53 YO F w/ h/o DM and intermittent hypotension of unknown etiology (never been worked up by a cardiologist) presented to [place] after being found unconscious found to have elevated LFTs with septic shock 2/2 cholangitis 2/2 choledocholithiasis.     # cholangitis s/p initial ERCP revealing ampullary stone and copious pus s/p precut sphincterotomy and stone removal, after interval medical management and stabilization, repeat ERCP was done with completion of sphincterotomy and removal of small pigment stones and sludge, no further pus noted. Distal CBD stenosis was noted s/p stent placement and normal bile flow  patient remains stable and asymptomatic; labs reflect interval biliary decompression  tolerating diet  -cont low fat diet  -appreciate surgery recs concerning subsequent cholecystectomy  -f/u with Dr. Tam for repeat ERCP and stent removal in 2-3 weeks    please reconsult as needed

## 2018-03-22 NOTE — PROGRESS NOTE ADULT - SUBJECTIVE AND OBJECTIVE BOX
Pt seen and examined   patient feeling well since procedure  tolerating diet, no abd pain or n/v    REVIEW OF SYSTEMS:  Constitutional: No fever, weight loss or fatigue  Cardiovascular: No chest pain, palpitations, dizziness or leg swelling  Gastrointestinal: No abdominal or epigastric pain. No nausea, vomiting or hematemesis; No diarrhea or constipation. No melena or hematochezia.  Skin: No itching, burning, rashes or lesions       MEDICATIONS:  MEDICATIONS  (STANDING):  dextrose 5%. 1000 milliLiter(s) (50 mL/Hr) IV Continuous <Continuous>  dextrose 50% Injectable 12.5 Gram(s) IV Push once  heparin  Injectable 5000 Unit(s) SubCutaneous every 8 hours  insulin glargine Injectable (LANTUS) 12 Unit(s) SubCutaneous at bedtime  insulin lispro (HumaLOG) corrective regimen sliding scale   SubCutaneous every 4 hours  insulin lispro Injectable (HumaLOG) 6 Unit(s) SubCutaneous three times a day before meals  pantoprazole  Injectable 40 milliGRAM(s) IV Push daily  piperacillin/tazobactam IVPB. 3.375 Gram(s) IV Intermittent every 6 hours    MEDICATIONS  (PRN):  dextrose Gel 1 Dose(s) Oral once PRN Blood Glucose LESS THAN 70 milliGRAM(s)/deciliter  glucagon  Injectable 1 milliGRAM(s) IntraMuscular once PRN Glucose LESS THAN 70 milligrams/deciliter      Allergies    No Known Drug Allergies  Seafood (Swelling; Rash)    Intolerances        Vital Signs Last 24 Hrs  T(C): 36.8 (22 Mar 2018 09:14), Max: 37 (21 Mar 2018 22:00)  T(F): 98.2 (22 Mar 2018 09:14), Max: 98.6 (21 Mar 2018 22:00)  HR: 62 (22 Mar 2018 12:00) (52 - 76)  BP: 95/59 (22 Mar 2018 09:00) (94/70 - 118/60)  BP(mean): 80 (22 Mar 2018 09:00) (80 - 88)  RR: 17 (22 Mar 2018 12:00) (13 - 22)  SpO2: 96% (22 Mar 2018 12:00) (94% - 99%)    03-21 @ 07:01  -  03-22 @ 07:00  --------------------------------------------------------  IN: 512 mL / OUT: 600 mL / NET: -88 mL    03-22 @ 07:01  -  03-22 @ 12:22  --------------------------------------------------------  IN: 504 mL / OUT: 0 mL / NET: 504 mL        PHYSICAL EXAM:    General: Well developed; well nourished; in no acute distress  HEENT: MMM, conjunctiva and sclera clear  Gastrointestinal: Soft non-tender non-distended; Normal bowel sounds; No hepatosplenomegaly. No rebound or guarding    Skin: Warm and dry. No obvious rash    LABS:  CBC Full  -  ( 22 Mar 2018 06:25 )  WBC Count : 8.1 K/uL  Hemoglobin : 11.5 g/dL  Hematocrit : 35.3 %  Platelet Count - Automated : 184 K/uL  Mean Cell Volume : 94.1 fL  Mean Cell Hemoglobin : 30.7 pg  Mean Cell Hemoglobin Concentration : 32.6 g/dL  Auto Neutrophil # : x  Auto Lymphocyte # : x  Auto Monocyte # : x  Auto Eosinophil # : x  Auto Basophil # : x  Auto Neutrophil % : x  Auto Lymphocyte % : x  Auto Monocyte % : x  Auto Eosinophil % : x  Auto Basophil % : x    03-22    146<H>  |  110<H>  |  9   ----------------------------<  252<H>  4.4   |  22  |  0.53    Ca    8.3<L>      22 Mar 2018 06:25  Phos  4.6     03-22  Mg     2.2     03-22    TPro  7.3  /  Alb  2.7<L>  /  TBili  0.9  /  DBili  x   /  AST  102<H>  /  ALT  75<H>  /  AlkPhos  322<H>  03-22    PT/INR - ( 21 Mar 2018 05:20 )   PT: 14.0 sec;   INR: 1.26          PTT - ( 21 Mar 2018 05:20 )  PTT:35.7 sec                RADIOLOGY & ADDITIONAL STUDIES:

## 2018-03-22 NOTE — PROGRESS NOTE ADULT - ASSESSMENT
ASSESSMENT: 52F with PMH DM, intermittent hypotension presents as transfer from [facility] with chronic epigastric pain, poor appetite, nausea, and vomiting with gram negative bacteremia and concern for cholangitis.     ID  #Septic shock 2/2 gram negative bacteremia due to cholangitis.  - BC growing klebsiella pneumonia, will f/u surveillance cultures sent yesterday  - Improving, off levophed since 3/21  - C/w zosyn (day 3)  - Source control with repeat ERCP 3/21.       GI  #Epigastric pain  - cholangitis/choledocholithiasis likely etiology   - GI on board and appreciate recs  - S/p ERCP attempt 3/20, unable to cannulate due to anatomy. Two stones were removed in sphincterotomy, as well as pus  - S/p repeat ERCP 3/21 with placement of stent that will require removal in 2 mos by GI.   - F/u H. pylori stool antigen   - Maintain active T&S   - Zosyn for empiric coverage of presumed intra-abdominal infection as well as gram negative bacteremia, day 3   - f/u Bcx from [facility] here growing klebsiella pneumonia in one bottle. Repeat cultures sent 3/21  - Trend CMP  - Protonix 40mg IV qd  - Advance diet today per GI      Pulm  #Throat pain   - RVP negative, rapid strep negative  - AM CXR with possible small bilateral pleural effusions    Endocrine  - On home metformin, 500mg, held in favor of sliding scale insulin. Sugars now that diet is on have been poorly controlled.   - A1C 10.6%  - Will require diabetic education and counseling  - F/u FSG . Started lantus 12 units subq at bedtime and will adjust pre-meal.   - TSH WNL    RENAL  - Increased urinary frequency without dysuria or incontinence  - UA negative for UTI    #Hypernatremia- Na on AM labs 147, was 138 yesterday.  - Likely due to NS now improving with PO intake.     FEN:  F: no IVF  E: Replete K<4 Mg<2  N: Advance to diabetic/DASH TLC normal diet as tolerated ASSESSMENT: 52F with PMH DM, intermittent hypotension presents as transfer from [facility] with chronic epigastric pain, poor appetite, nausea, and vomiting with gram negative bacteremia and concern for cholangitis.     ID  #Septic shock 2/2 gram negative bacteremia due to cholangitis.  - BC growing klebsiella pneumonia, will f/u surveillance cultures sent yesterday  - Improving, off levophed since 3/21  - C/w zosyn (day 3)  - Source control with repeat ERCP 3/21.   - Surgery to evaluate for cholecystectomy as OP or inpatient.       GI  #Epigastric pain  - cholangitis/choledocholithiasis likely etiology   - GI on board and appreciate recs  - S/p ERCP attempt 3/20, unable to cannulate due to anatomy. Two stones were removed in sphincterotomy, as well as pus  - S/p repeat ERCP 3/21 with placement of stent that will require removal in 2 mos by GI.   - F/u H. pylori stool antigen   - Maintain active T&S   - Zosyn for empiric coverage of presumed intra-abdominal infection as well as gram negative bacteremia, day 3   - f/u Bcx from [facility] here growing klebsiella pneumonia in one bottle. Repeat cultures sent 3/21  - Trend CMP  - Protonix 40mg IV qd  - Tolerating diet well  - Surgical evaluation for cholecystectomy     Pulm  #Throat pain   - RVP negative, rapid strep negative  - AM CXR with possible small bilateral pleural effusions    Endocrine  - On home metformin, 500mg, held in favor of sliding scale insulin. Sugars now that diet is on have been poorly controlled.   - A1C 10.6%  - Will require diabetic education and counseling  - F/u FSG . Started lantus 12 units subq at bedtime and pre-meal 6 units tid. No longer on insulin drip.  - TSH WNL    RENAL  - Increased urinary frequency without dysuria or incontinence  - UA negative for UTI    #Hypernatremia- Na on AM labs improving   - Likely due to NS now improving with PO intake.     FEN:  F: no IVF  E: Replete K<4 Mg<2  N: Advance to diabetic/DASH TLC normal diet as tolerated

## 2018-03-22 NOTE — PROGRESS NOTE ADULT - ASSESSMENT
52y F with septic shock 2/2 cholangitis 2/2 choledocholithiasis now s/p ERCP x2 stone removal, CBD stenting, sepsis now resolved    - continue to monitor daily CMP, lipase.   - Surgery continue to follow - planning for outpatient versus inpatient laparoscopic cholecystectomy(discussions being held between attendings)  - D/w Dr. Puente

## 2018-03-23 DIAGNOSIS — R78.81 BACTEREMIA: ICD-10-CM

## 2018-03-23 DIAGNOSIS — E11.9 TYPE 2 DIABETES MELLITUS WITHOUT COMPLICATIONS: ICD-10-CM

## 2018-03-23 DIAGNOSIS — E87.0 HYPEROSMOLALITY AND HYPERNATREMIA: ICD-10-CM

## 2018-03-23 DIAGNOSIS — A41.9 SEPSIS, UNSPECIFIED ORGANISM: ICD-10-CM

## 2018-03-23 DIAGNOSIS — K83.0 CHOLANGITIS: ICD-10-CM

## 2018-03-23 LAB
ALBUMIN SERPL ELPH-MCNC: 2.5 G/DL — LOW (ref 3.3–5)
ALP SERPL-CCNC: 242 U/L — HIGH (ref 40–120)
ALT FLD-CCNC: 52 U/L — HIGH (ref 10–45)
ANION GAP SERPL CALC-SCNC: 10 MMOL/L — SIGNIFICANT CHANGE UP (ref 5–17)
AST SERPL-CCNC: 39 U/L — SIGNIFICANT CHANGE UP (ref 10–40)
BILIRUB SERPL-MCNC: 0.8 MG/DL — SIGNIFICANT CHANGE UP (ref 0.2–1.2)
BUN SERPL-MCNC: 10 MG/DL — SIGNIFICANT CHANGE UP (ref 7–23)
CALCIUM SERPL-MCNC: 8.5 MG/DL — SIGNIFICANT CHANGE UP (ref 8.4–10.5)
CHLORIDE SERPL-SCNC: 108 MMOL/L — SIGNIFICANT CHANGE UP (ref 96–108)
CO2 SERPL-SCNC: 27 MMOL/L — SIGNIFICANT CHANGE UP (ref 22–31)
CREAT SERPL-MCNC: 0.65 MG/DL — SIGNIFICANT CHANGE UP (ref 0.5–1.3)
GLUCOSE BLDC GLUCOMTR-MCNC: 103 MG/DL — HIGH (ref 70–99)
GLUCOSE BLDC GLUCOMTR-MCNC: 125 MG/DL — HIGH (ref 70–99)
GLUCOSE BLDC GLUCOMTR-MCNC: 213 MG/DL — HIGH (ref 70–99)
GLUCOSE BLDC GLUCOMTR-MCNC: 74 MG/DL — SIGNIFICANT CHANGE UP (ref 70–99)
GLUCOSE SERPL-MCNC: 146 MG/DL — HIGH (ref 70–99)
HCT VFR BLD CALC: 34.4 % — LOW (ref 34.5–45)
HGB BLD-MCNC: 11.2 G/DL — LOW (ref 11.5–15.5)
MAGNESIUM SERPL-MCNC: 2 MG/DL — SIGNIFICANT CHANGE UP (ref 1.6–2.6)
MCHC RBC-ENTMCNC: 30.4 PG — SIGNIFICANT CHANGE UP (ref 27–34)
MCHC RBC-ENTMCNC: 32.6 G/DL — SIGNIFICANT CHANGE UP (ref 32–36)
MCV RBC AUTO: 93.5 FL — SIGNIFICANT CHANGE UP (ref 80–100)
PHOSPHATE SERPL-MCNC: 2.9 MG/DL — SIGNIFICANT CHANGE UP (ref 2.5–4.5)
PLATELET # BLD AUTO: 174 K/UL — SIGNIFICANT CHANGE UP (ref 150–400)
POTASSIUM SERPL-MCNC: 3.8 MMOL/L — SIGNIFICANT CHANGE UP (ref 3.5–5.3)
POTASSIUM SERPL-SCNC: 3.8 MMOL/L — SIGNIFICANT CHANGE UP (ref 3.5–5.3)
PROT SERPL-MCNC: 6.7 G/DL — SIGNIFICANT CHANGE UP (ref 6–8.3)
RBC # BLD: 3.68 M/UL — LOW (ref 3.8–5.2)
RBC # FLD: 14.4 % — SIGNIFICANT CHANGE UP (ref 10.3–16.9)
SODIUM SERPL-SCNC: 145 MMOL/L — SIGNIFICANT CHANGE UP (ref 135–145)
WBC # BLD: 10.1 K/UL — SIGNIFICANT CHANGE UP (ref 3.8–10.5)
WBC # FLD AUTO: 10.1 K/UL — SIGNIFICANT CHANGE UP (ref 3.8–10.5)

## 2018-03-23 PROCEDURE — 99232 SBSQ HOSP IP/OBS MODERATE 35: CPT | Mod: GC

## 2018-03-23 PROCEDURE — 76856 US EXAM PELVIC COMPLETE: CPT | Mod: 26

## 2018-03-23 PROCEDURE — 99232 SBSQ HOSP IP/OBS MODERATE 35: CPT | Mod: 57,GC

## 2018-03-23 PROCEDURE — 76830 TRANSVAGINAL US NON-OB: CPT | Mod: 26

## 2018-03-23 RX ORDER — POTASSIUM CHLORIDE 20 MEQ
10 PACKET (EA) ORAL
Qty: 0 | Refills: 0 | Status: COMPLETED | OUTPATIENT
Start: 2018-03-23 | End: 2018-03-23

## 2018-03-23 RX ORDER — SODIUM CHLORIDE 9 MG/ML
1000 INJECTION, SOLUTION INTRAVENOUS
Qty: 0 | Refills: 0 | Status: DISCONTINUED | OUTPATIENT
Start: 2018-03-23 | End: 2018-03-24

## 2018-03-23 RX ORDER — SODIUM CHLORIDE 9 MG/ML
1000 INJECTION INTRAMUSCULAR; INTRAVENOUS; SUBCUTANEOUS
Qty: 0 | Refills: 0 | Status: DISCONTINUED | OUTPATIENT
Start: 2018-03-23 | End: 2018-03-23

## 2018-03-23 RX ADMIN — Medication 6 UNIT(S): at 17:33

## 2018-03-23 RX ADMIN — AMPICILLIN SODIUM AND SULBACTAM SODIUM 200 GRAM(S): 250; 125 INJECTION, POWDER, FOR SUSPENSION INTRAMUSCULAR; INTRAVENOUS at 11:54

## 2018-03-23 RX ADMIN — Medication 6 UNIT(S): at 07:42

## 2018-03-23 RX ADMIN — Medication 2: at 11:55

## 2018-03-23 RX ADMIN — Medication 100 MILLIEQUIVALENT(S): at 10:48

## 2018-03-23 RX ADMIN — HEPARIN SODIUM 5000 UNIT(S): 5000 INJECTION INTRAVENOUS; SUBCUTANEOUS at 06:12

## 2018-03-23 RX ADMIN — Medication 100 MILLIEQUIVALENT(S): at 12:34

## 2018-03-23 RX ADMIN — HEPARIN SODIUM 5000 UNIT(S): 5000 INJECTION INTRAVENOUS; SUBCUTANEOUS at 14:57

## 2018-03-23 RX ADMIN — INSULIN GLARGINE 20 UNIT(S): 100 INJECTION, SOLUTION SUBCUTANEOUS at 22:55

## 2018-03-23 RX ADMIN — PANTOPRAZOLE SODIUM 40 MILLIGRAM(S): 20 TABLET, DELAYED RELEASE ORAL at 10:48

## 2018-03-23 RX ADMIN — AMPICILLIN SODIUM AND SULBACTAM SODIUM 200 GRAM(S): 250; 125 INJECTION, POWDER, FOR SUSPENSION INTRAMUSCULAR; INTRAVENOUS at 17:33

## 2018-03-23 RX ADMIN — AMPICILLIN SODIUM AND SULBACTAM SODIUM 200 GRAM(S): 250; 125 INJECTION, POWDER, FOR SUSPENSION INTRAMUSCULAR; INTRAVENOUS at 06:12

## 2018-03-23 RX ADMIN — Medication 6 UNIT(S): at 11:55

## 2018-03-23 RX ADMIN — HEPARIN SODIUM 5000 UNIT(S): 5000 INJECTION INTRAVENOUS; SUBCUTANEOUS at 22:55

## 2018-03-23 RX ADMIN — AMPICILLIN SODIUM AND SULBACTAM SODIUM 200 GRAM(S): 250; 125 INJECTION, POWDER, FOR SUSPENSION INTRAMUSCULAR; INTRAVENOUS at 00:01

## 2018-03-23 NOTE — PROGRESS NOTE ADULT - ASSESSMENT
ASSESSMENT: 52F with PMH DM, intermittent hypotension presents as transfer from [facility] with chronic epigastric pain, poor appetite, nausea, and vomiting with gram negative bacteremia and concern for cholangitis.     ID  #Septic shock 2/2 gram negative bacteremia due to cholangitis.  - BC growing klebsiella pneumonia, will f/u surveillance cultures sent yesterday  - Improving, off levophed since 3/21  - C/w zosyn (day 3)  - Source control with repeat ERCP 3/21.   - Surgery to evaluate for cholecystectomy as OP or inpatient.       GI  #Epigastric pain  - cholangitis/choledocholithiasis likely etiology   - GI on board and appreciate recs  - S/p ERCP attempt 3/20, unable to cannulate due to anatomy. Two stones were removed in sphincterotomy, as well as pus  - S/p repeat ERCP 3/21 with placement of stent that will require removal in 2 mos by GI.   - F/u H. pylori stool antigen   - Maintain active T&S   - Zosyn for empiric coverage of presumed intra-abdominal infection as well as gram negative bacteremia, day 3   - f/u Bcx from [facility] here growing klebsiella pneumonia in one bottle. Repeat cultures sent 3/21  - Trend CMP  - Protonix 40mg IV qd  - Tolerating diet well  - Surgical evaluation for cholecystectomy     Pulm  #Throat pain   - RVP negative, rapid strep negative  - AM CXR with possible small bilateral pleural effusions    Endocrine  - On home metformin, 500mg, held in favor of sliding scale insulin. Sugars now that diet is on have been poorly controlled.   - A1C 10.6%  - Will require diabetic education and counseling  - F/u FSG . Started lantus 12 units subq at bedtime and pre-meal 6 units tid. No longer on insulin drip.  - TSH WNL    RENAL  - Increased urinary frequency without dysuria or incontinence  - UA negative for UTI    #Hypernatremia- Na on AM labs improving   - Likely due to NS now improving with PO intake.     FEN:  F: no IVF  E: Replete K<4 Mg<2  N: Advance to diabetic/DASH TLC normal diet as tolerated

## 2018-03-23 NOTE — PROGRESS NOTE ADULT - SUBJECTIVE AND OBJECTIVE BOX
Interval Events: Reviewed  Patient seen and examined at bedside.    Patient is a 52y old  Female who presents with a chief complaint of stomach pain (19 Mar 2018 23:39)  she is having vaginal bleeding.  Otherwise she is doing well    PAST MEDICAL & SURGICAL HISTORY:  Diabetes   delivery delivered      MEDICATIONS:  Pulmonary:    Antimicrobials:  ampicillin/sulbactam  IVPB 3 Gram(s) IV Intermittent every 6 hours    Anticoagulants:  heparin  Injectable 5000 Unit(s) SubCutaneous every 8 hours    Cardiac:      Allergies    No Known Drug Allergies  Seafood (Swelling; Rash)    Intolerances        Vital Signs Last 24 Hrs  T(C): 36.8 (23 Mar 2018 21:11), Max: 36.8 (23 Mar 2018 21:11)  T(F): 98.3 (23 Mar 2018 21:11), Max: 98.3 (23 Mar 2018 21:11)  HR: 64 (23 Mar 2018 20:50) (48 - 64)  BP: 136/61 (23 Mar 2018 20:50) (95/50 - 136/61)  BP(mean): 88 (23 Mar 2018 20:50) (68 - 88)  RR: 17 (23 Mar 2018 20:50) (16 - 17)  SpO2: 94% (23 Mar 2018 20:50) (94% - 98%)     @ 07: @ 07:00  --------------------------------------------------------  IN: 1004 mL / OUT: 1200 mL / NET: -196 mL     @ 07: @ 23:33  --------------------------------------------------------  IN: 400 mL / OUT: 500 mL / NET: -100 mL          LABS:      CBC Full  -  ( 23 Mar 2018 06:59 )  WBC Count : 10.1 K/uL  Hemoglobin : 11.2 g/dL  Hematocrit : 34.4 %  Platelet Count - Automated : 174 K/uL  Mean Cell Volume : 93.5 fL  Mean Cell Hemoglobin : 30.4 pg  Mean Cell Hemoglobin Concentration : 32.6 g/dL  Auto Neutrophil # : x  Auto Lymphocyte # : x  Auto Monocyte # : x  Auto Eosinophil # : x  Auto Basophil # : x  Auto Neutrophil % : x  Auto Lymphocyte % : x  Auto Monocyte % : x  Auto Eosinophil % : x  Auto Basophil % : x        145  |  108  |  10  ----------------------------<  146<H>  3.8   |  27  |  0.65    Ca    8.5      23 Mar 2018 07:00  Phos  2.9       Mg     2.0         TPro  6.7  /  Alb  2.5<L>  /  TBili  0.8  /  DBili  x   /  AST  39  /  ALT  52<H>  /  AlkPhos  242<H>            Culture - Blood (18 @ 16:51)    Specimen Source: .Blood Blood-Venous    Culture Results:   No growth at 1 day.    Culture - Blood (18 @ 17:40)    Specimen Source: .Blood Blood    Culture Results:   No growth at 2 days.              Culture Results:   No growth at 1 day. ( @ 16:51)      RADIOLOGY & ADDITIONAL STUDIES (The following images were personally reviewed):  Roman:                                     No  Urine output:                       adequate  DVT prophylaxis:                 Yes  Flattus:                                  Yes  Bowel movement:              No

## 2018-03-23 NOTE — PROGRESS NOTE ADULT - ASSESSMENT
52F s/p ERCP with CBD stent placement and sphincterotomy    -lap jacob Saturday  -Unasyn  -Soft diet  -SQH/SCDs

## 2018-03-23 NOTE — PROGRESS NOTE ADULT - SUBJECTIVE AND OBJECTIVE BOX
3/22/ovn: transferred from medicine, ERCP today stent placement/sphincterotomy 3/22/ovn: transferred from medicine, ERCP today stent placement/sphincterotomy        Vital Signs Last 24 Hrs  T(C): 36.2 (23 Mar 2018 09:00), Max: 36.4 (22 Mar 2018 14:00)  T(F): 97.1 (23 Mar 2018 09:00), Max: 97.5 (22 Mar 2018 14:00)  HR: 54 (23 Mar 2018 08:15) (48 - 64)  BP: 106/57 (23 Mar 2018 08:15) (85/55 - 106/57)  BP(mean): 77 (23 Mar 2018 08:15) (66 - 85)  RR: 16 (23 Mar 2018 08:15) (16 - 22)  SpO2: 97% (23 Mar 2018 08:15) (95% - 98%)      I&O's Summary    22 Mar 2018 07:01  -  23 Mar 2018 07:00  --------------------------------------------------------  IN: 1004 mL / OUT: 1200 mL / NET: -196 mL          Gen: NAD   Abd: soft, nt / nd       52F s/p ERCP with CBD stent placement and sphincterotomy    -lap jacob Saturday  -Unasyn  -Soft diet  -SQH/SCDs

## 2018-03-24 ENCOUNTER — RESULT REVIEW (OUTPATIENT)
Age: 53
End: 2018-03-24

## 2018-03-24 LAB
-  AMPICILLIN/SULBACTAM: SIGNIFICANT CHANGE UP
-  AMPICILLIN: SIGNIFICANT CHANGE UP
-  CEFAZOLIN: SIGNIFICANT CHANGE UP
-  CEFTRIAXONE: SIGNIFICANT CHANGE UP
-  CIPROFLOXACIN: SIGNIFICANT CHANGE UP
-  GENTAMICIN: SIGNIFICANT CHANGE UP
-  PIPERACILLIN/TAZOBACTAM: SIGNIFICANT CHANGE UP
-  TOBRAMYCIN: SIGNIFICANT CHANGE UP
-  TRIMETHOPRIM/SULFAMETHOXAZOLE: SIGNIFICANT CHANGE UP
ANION GAP SERPL CALC-SCNC: 10 MMOL/L — SIGNIFICANT CHANGE UP (ref 5–17)
APTT BLD: 38.9 SEC — HIGH (ref 27.5–37.4)
BLD GP AB SCN SERPL QL: NEGATIVE — SIGNIFICANT CHANGE UP
BUN SERPL-MCNC: 8 MG/DL — SIGNIFICANT CHANGE UP (ref 7–23)
CALCIUM SERPL-MCNC: 8.5 MG/DL — SIGNIFICANT CHANGE UP (ref 8.4–10.5)
CHLORIDE SERPL-SCNC: 112 MMOL/L — HIGH (ref 96–108)
CO2 SERPL-SCNC: 29 MMOL/L — SIGNIFICANT CHANGE UP (ref 22–31)
CREAT SERPL-MCNC: 0.68 MG/DL — SIGNIFICANT CHANGE UP (ref 0.5–1.3)
GLUCOSE BLDC GLUCOMTR-MCNC: 152 MG/DL — HIGH (ref 70–99)
GLUCOSE BLDC GLUCOMTR-MCNC: 163 MG/DL — HIGH (ref 70–99)
GLUCOSE BLDC GLUCOMTR-MCNC: 205 MG/DL — HIGH (ref 70–99)
GLUCOSE BLDC GLUCOMTR-MCNC: 222 MG/DL — HIGH (ref 70–99)
GLUCOSE BLDC GLUCOMTR-MCNC: 344 MG/DL — HIGH (ref 70–99)
GLUCOSE SERPL-MCNC: 189 MG/DL — HIGH (ref 70–99)
HCT VFR BLD CALC: 37.6 % — SIGNIFICANT CHANGE UP (ref 34.5–45)
HGB BLD-MCNC: 12.3 G/DL — SIGNIFICANT CHANGE UP (ref 11.5–15.5)
INR BLD: 1.11 — SIGNIFICANT CHANGE UP (ref 0.88–1.16)
MAGNESIUM SERPL-MCNC: 1.8 MG/DL — SIGNIFICANT CHANGE UP (ref 1.6–2.6)
MCHC RBC-ENTMCNC: 30.8 PG — SIGNIFICANT CHANGE UP (ref 27–34)
MCHC RBC-ENTMCNC: 32.7 G/DL — SIGNIFICANT CHANGE UP (ref 32–36)
MCV RBC AUTO: 94.2 FL — SIGNIFICANT CHANGE UP (ref 80–100)
METHOD TYPE: SIGNIFICANT CHANGE UP
PHOSPHATE SERPL-MCNC: 4.4 MG/DL — SIGNIFICANT CHANGE UP (ref 2.5–4.5)
PLATELET # BLD AUTO: 200 K/UL — SIGNIFICANT CHANGE UP (ref 150–400)
POTASSIUM SERPL-MCNC: 3.3 MMOL/L — LOW (ref 3.5–5.3)
POTASSIUM SERPL-SCNC: 3.3 MMOL/L — LOW (ref 3.5–5.3)
PROTHROM AB SERPL-ACNC: 12.3 SEC — SIGNIFICANT CHANGE UP (ref 9.8–12.7)
RBC # BLD: 3.99 M/UL — SIGNIFICANT CHANGE UP (ref 3.8–5.2)
RBC # FLD: 14.3 % — SIGNIFICANT CHANGE UP (ref 10.3–16.9)
RH IG SCN BLD-IMP: POSITIVE — SIGNIFICANT CHANGE UP
SODIUM SERPL-SCNC: 151 MMOL/L — HIGH (ref 135–145)
WBC # BLD: 9 K/UL — SIGNIFICANT CHANGE UP (ref 3.8–10.5)
WBC # FLD AUTO: 9 K/UL — SIGNIFICANT CHANGE UP (ref 3.8–10.5)

## 2018-03-24 PROCEDURE — 47562 LAPAROSCOPIC CHOLECYSTECTOMY: CPT

## 2018-03-24 RX ORDER — AMPICILLIN SODIUM AND SULBACTAM SODIUM 250; 125 MG/ML; MG/ML
3 INJECTION, POWDER, FOR SUSPENSION INTRAMUSCULAR; INTRAVENOUS EVERY 6 HOURS
Qty: 0 | Refills: 0 | Status: DISCONTINUED | OUTPATIENT
Start: 2018-03-24 | End: 2018-03-25

## 2018-03-24 RX ORDER — INSULIN GLARGINE 100 [IU]/ML
12 INJECTION, SOLUTION SUBCUTANEOUS AT BEDTIME
Qty: 0 | Refills: 0 | Status: DISCONTINUED | OUTPATIENT
Start: 2018-03-24 | End: 2018-03-24

## 2018-03-24 RX ORDER — HYDROMORPHONE HYDROCHLORIDE 2 MG/ML
0.5 INJECTION INTRAMUSCULAR; INTRAVENOUS; SUBCUTANEOUS EVERY 6 HOURS
Qty: 0 | Refills: 0 | Status: DISCONTINUED | OUTPATIENT
Start: 2018-03-24 | End: 2018-03-24

## 2018-03-24 RX ORDER — INSULIN LISPRO 100/ML
VIAL (ML) SUBCUTANEOUS EVERY 4 HOURS
Qty: 0 | Refills: 0 | Status: DISCONTINUED | OUTPATIENT
Start: 2018-03-24 | End: 2018-03-25

## 2018-03-24 RX ORDER — POTASSIUM CHLORIDE 20 MEQ
10 PACKET (EA) ORAL
Qty: 0 | Refills: 0 | Status: DISCONTINUED | OUTPATIENT
Start: 2018-03-24 | End: 2018-03-24

## 2018-03-24 RX ORDER — HYDROMORPHONE HYDROCHLORIDE 2 MG/ML
0.5 INJECTION INTRAMUSCULAR; INTRAVENOUS; SUBCUTANEOUS EVERY 6 HOURS
Qty: 0 | Refills: 0 | Status: DISCONTINUED | OUTPATIENT
Start: 2018-03-24 | End: 2018-03-25

## 2018-03-24 RX ORDER — SODIUM CHLORIDE 9 MG/ML
1000 INJECTION INTRAMUSCULAR; INTRAVENOUS; SUBCUTANEOUS
Qty: 0 | Refills: 0 | Status: DISCONTINUED | OUTPATIENT
Start: 2018-03-24 | End: 2018-03-25

## 2018-03-24 RX ORDER — ONDANSETRON 8 MG/1
4 TABLET, FILM COATED ORAL EVERY 4 HOURS
Qty: 0 | Refills: 0 | Status: DISCONTINUED | OUTPATIENT
Start: 2018-03-24 | End: 2018-03-25

## 2018-03-24 RX ORDER — HYDROMORPHONE HYDROCHLORIDE 2 MG/ML
1 INJECTION INTRAMUSCULAR; INTRAVENOUS; SUBCUTANEOUS EVERY 6 HOURS
Qty: 0 | Refills: 0 | Status: DISCONTINUED | OUTPATIENT
Start: 2018-03-24 | End: 2018-03-25

## 2018-03-24 RX ORDER — HEPARIN SODIUM 5000 [USP'U]/ML
5000 INJECTION INTRAVENOUS; SUBCUTANEOUS EVERY 8 HOURS
Qty: 0 | Refills: 0 | Status: DISCONTINUED | OUTPATIENT
Start: 2018-03-24 | End: 2018-03-25

## 2018-03-24 RX ORDER — INSULIN GLARGINE 100 [IU]/ML
12 INJECTION, SOLUTION SUBCUTANEOUS AT BEDTIME
Qty: 0 | Refills: 0 | Status: DISCONTINUED | OUTPATIENT
Start: 2018-03-24 | End: 2018-03-25

## 2018-03-24 RX ADMIN — AMPICILLIN SODIUM AND SULBACTAM SODIUM 200 GRAM(S): 250; 125 INJECTION, POWDER, FOR SUSPENSION INTRAMUSCULAR; INTRAVENOUS at 18:40

## 2018-03-24 RX ADMIN — Medication 2: at 10:32

## 2018-03-24 RX ADMIN — HYDROMORPHONE HYDROCHLORIDE 0.5 MILLIGRAM(S): 2 INJECTION INTRAMUSCULAR; INTRAVENOUS; SUBCUTANEOUS at 10:28

## 2018-03-24 RX ADMIN — HYDROMORPHONE HYDROCHLORIDE 0.5 MILLIGRAM(S): 2 INJECTION INTRAMUSCULAR; INTRAVENOUS; SUBCUTANEOUS at 13:03

## 2018-03-24 RX ADMIN — SODIUM CHLORIDE 110 MILLILITER(S): 9 INJECTION INTRAMUSCULAR; INTRAVENOUS; SUBCUTANEOUS at 22:21

## 2018-03-24 RX ADMIN — AMPICILLIN SODIUM AND SULBACTAM SODIUM 200 GRAM(S): 250; 125 INJECTION, POWDER, FOR SUSPENSION INTRAMUSCULAR; INTRAVENOUS at 00:00

## 2018-03-24 RX ADMIN — Medication 4: at 18:20

## 2018-03-24 RX ADMIN — AMPICILLIN SODIUM AND SULBACTAM SODIUM 200 GRAM(S): 250; 125 INJECTION, POWDER, FOR SUSPENSION INTRAMUSCULAR; INTRAVENOUS at 07:08

## 2018-03-24 RX ADMIN — Medication 4: at 15:26

## 2018-03-24 RX ADMIN — HEPARIN SODIUM 5000 UNIT(S): 5000 INJECTION INTRAVENOUS; SUBCUTANEOUS at 22:21

## 2018-03-24 RX ADMIN — Medication 8: at 22:47

## 2018-03-24 RX ADMIN — AMPICILLIN SODIUM AND SULBACTAM SODIUM 200 GRAM(S): 250; 125 INJECTION, POWDER, FOR SUSPENSION INTRAMUSCULAR; INTRAVENOUS at 13:03

## 2018-03-24 RX ADMIN — SODIUM CHLORIDE 110 MILLILITER(S): 9 INJECTION INTRAMUSCULAR; INTRAVENOUS; SUBCUTANEOUS at 13:00

## 2018-03-24 RX ADMIN — INSULIN GLARGINE 12 UNIT(S): 100 INJECTION, SOLUTION SUBCUTANEOUS at 22:48

## 2018-03-24 NOTE — BRIEF OPERATIVE NOTE - OPERATION/FINDINGS
Chronically inflamed and scarred gallbladder.  Cystic duct and artery identified and clipped with Hemolok clips.

## 2018-03-24 NOTE — BRIEF OPERATIVE NOTE - PROCEDURE
<<-----Click on this checkbox to enter Procedure Laparoscopic cholecystectomy  03/24/2018    Active  BUBBA

## 2018-03-24 NOTE — PROGRESS NOTE ADULT - SUBJECTIVE AND OBJECTIVE BOX
o/n: transvaginal us revealing abnormal thickness of endometrium, preopped  3/23: small bleeding from vagina, gyn consulted Transvaginal US ordered. o/n: transvaginal us revealing abnormal thickness of endometrium, preopped  3/23: small bleeding from vagina, gyn consulted Transvaginal US ordered.         Vital Signs Last 24 Hrs  T(C): 36.2 (24 Mar 2018 05:38), Max: 36.8 (23 Mar 2018 21:11)  T(F): 97.1 (24 Mar 2018 05:38), Max: 98.3 (23 Mar 2018 21:11)  HR: 58 (24 Mar 2018 05:10) (52 - 64)  BP: 101/55 (24 Mar 2018 05:10) (95/50 - 136/61)  BP(mean): 73 (24 Mar 2018 05:10) (70 - 88)  RR: 17 (24 Mar 2018 05:10) (16 - 17)  SpO2: 94% (24 Mar 2018 05:10) (94% - 98%)      I&O's Summary    22 Mar 2018 07:01  -  23 Mar 2018 07:00  --------------------------------------------------------  IN: 1004 mL / OUT: 1200 mL / NET: -196 mL    23 Mar 2018 07:01  -  24 Mar 2018 06:08  --------------------------------------------------------  IN: 400 mL / OUT: 1300 mL / NET: -900 mL          Gen: NAD   Abd: soft, nt / nd         52F s/p ERCP with CBD stent placement and sphincterotomy    -lap jacob Saturday  -Unasyn  -Soft diet  -SQH/SCDs

## 2018-03-24 NOTE — PROGRESS NOTE ADULT - SUBJECTIVE AND OBJECTIVE BOX
52 year old female presented with cholangitis s/p ERCP   now s/p lap jacob   seen and examined in pacu   pain controlled  no acute complaints       Vital Signs Last 24 Hrs  T(C): 36 (24 Mar 2018 09:35), Max: 36.8 (23 Mar 2018 21:11)  T(F): 96.8 (24 Mar 2018 09:35), Max: 98.3 (23 Mar 2018 21:11)  HR: 58 (24 Mar 2018 10:30) (50 - 64)  BP: 101/49 (24 Mar 2018 10:30) (95/50 - 136/61)  BP(mean): 73 (24 Mar 2018 10:30) (70 - 88)  RR: 16 (24 Mar 2018 10:30) (16 - 20)  SpO2: 96% (24 Mar 2018 10:30) (94% - 98%)      I&O's Summary    23 Mar 2018 07:01  -  24 Mar 2018 07:00  --------------------------------------------------------  IN: 400 mL / OUT: 1300 mL / NET: -900 mL    24 Mar 2018 07:01  -  24 Mar 2018 12:07  --------------------------------------------------------  IN: 700 mL / OUT: 0 mL / NET: 700 mL        Gen: NAD   Abd: soft, nt / nd   incsions c/d/i     plan:   pain control  dvt ppx   CLD   consider LFD for diner   abx for 10 days per GI

## 2018-03-24 NOTE — CONSULT NOTE ADULT - SUBJECTIVE AND OBJECTIVE BOX
52y P3 admitted to surgery for cholangitis s/p ERCP and l/s cholecystectomy. Consulted due to vaginal bleeding post menopausal after receiving heparin during hospital stay. Pt states that she has had no GYN problems; she had 1 vaginal delivery and a c/s for twins. She get routine Pap tests by PCP, last 2 years ago. Denies any FH of ovarian, uterine or breast CA. Her menstrual cycle stopped 3 years ago and she hasnt bleed since. She had one episode here and from then denies any more bleeding. Denies pelvic pain.       HPI:  52YF w/ pmhx of ?DM (prev. hba1c of ?8.1%) and intermittent hypotension of unknown etiology (never been worked up by a cardiologist) presented to [facility] after being found unconscious on her couch and 'green'. She has had a 6mo hx of epigastric pain, non-radiating, dull, with inability to swallow solid foods and reduced appetite associated w/ n/v and 8-10 pound weight loss. Able to tolerate fluids. No diarrhea or change in stool habits. Feels as if food gets 'stuck' in epigastric region. No significant pain with swallowing in the upper esophageal region. Also notable is a history of intermittent swinging f/c, with chills predominantly at night requiring multiple blankets and painful lymph nodes in the neck bilaterally. Recent breast biopsy in Feb. found to be benign.     At [facility] patient with severe epigastric pain found to have elevated LFTs 214/112, lipase of 682, Tbili 2.8, alk phos 457, and CT a/p c/f choledoco w/ 3 small distal calculi in common bile duct with mild extrahepatic ductal dilation w/ CBD measure 1cm. Christina-pancreatic tail edema.     Had refractory hypotension requiring several L NS w/ shaniqua of 80 systolic and peripheral levo and tachycardia to 140s. No leukocytosis or fever, but lactate of 7.6. Planned for ERCP on Wed. but determined to be too acute and transferred to [facility] for earlier ERCP. GI consulted and surgery made aware (Dr. Richards). Given zosyn/clinda.     No recent travel. Immigrated from [facility] [place] years ago. No cough. Complains of increased urinary frequency the last couple months but no dysuria. One week of steroid use in february for ?sinusitis. (19 Mar 2018 23:39)        Last Menstrual Period: 3y ago     PAST MEDICAL & SURGICAL HISTORY:  Diabetes   delivery delivered      SOCIAL HISTORY:    FAMILY HISTORY:  No pertinent family history in first degree relatives      MEDICATIONS  (STANDING):  ampicillin/sulbactam  IVPB 3 Gram(s) IV Intermittent every 6 hours  heparin  Injectable 5000 Unit(s) SubCutaneous every 8 hours  insulin glargine Injectable (LANTUS) 12 Unit(s) SubCutaneous at bedtime  insulin lispro (HumaLOG) corrective regimen sliding scale   SubCutaneous every 4 hours  sodium chloride 0.9%. 1000 milliLiter(s) (110 mL/Hr) IV Continuous <Continuous>    MEDICATIONS  (PRN):  HYDROmorphone  Injectable 0.5 milliGRAM(s) IV Push every 6 hours PRN Moderate Pain (4 - 6)  HYDROmorphone  Injectable 1 milliGRAM(s) IV Push every 6 hours PRN Severe Pain (7 - 10)  ondansetron Injectable 4 milliGRAM(s) IV Push every 4 hours PRN Nausea and/or Vomiting      Allergies    No Known Drug Allergies  Seafood (Swelling; Rash)    Intolerances        PHYSICAL EXAM:   Vital Signs Last 24 Hrs  T(C): 36.1 (24 Mar 2018 21:50), Max: 36.5 (24 Mar 2018 16:56)  T(F): 96.9 (24 Mar 2018 21:50), Max: 97.7 (24 Mar 2018 16:56)  HR: 70 (24 Mar 2018 21:50) (50 - 77)  BP: 117/75 (24 Mar 2018 21:50) (96/47 - 121/61)  BP(mean): 92 (24 Mar 2018 15:00) (71 - 92)  RR: 16 (24 Mar 2018 21:50) (13 - 20)  SpO2: 95% (24 Mar 2018 21:50) (94% - 98%)    **************************  Constitutional: Alert & Oriented x3, No acute distress, cooperative   Respiratory: Clear to ausculation bilaterally; no wheezing, rhonchi, or crackles  Cardiovascular: S1 &S2 physiologic, no murmurs, or gallops  Gastrointestinal: soft, non tender, positive bowel sounds, no rebound or guarding   Speculum exam: deferred per pt request  Extremities: no calf tenderness or swelling      LABS:                        12.3   9.0   )-----------( 200      ( 24 Mar 2018 06:11 )             37.6         151<H>  |  112<H>  |  8   ----------------------------<  189<H>  3.3<L>   |  29  |  0.68    Ca    8.5      24 Mar 2018 06:11  Phos  4.4       Mg     1.8         TPro  6.7  /  Alb  2.5<L>  /  TBili  0.8  /  DBili  x   /  AST  39  /  ALT  52<H>  /  AlkPhos  242<H>      PT/INR - ( 24 Mar 2018 06:11 )   PT: 12.3 sec;   INR: 1.11          PTT - ( 24 Mar 2018 06:11 )  PTT:38.9 sec      RADIOLOGY & ADDITIONAL STUDIES:  < from: US Transvaginal (18 @ 19:53) >    EXAM:  US TRANSVAGINAL                          EXAM:  US PELVIC COMPLETE                          PROCEDURE DATE:  2018                     INTERPRETATION:      PELVIC ULTRASOUND dated 3/23/2018 7:22 PM    INDICATION: 52-year-old female withpain and vaginal bleeding LMP:   Postmenopausal x3 years    TECHNIQUE: Transabdominal views of the pelvis were obtained followed by   transvaginal views for better visualization of the ovaries.      PRIOR STUDIES: None    FINDINGS:   These images demonstrate the uterus to be anteverted. The uterus is   normal in size and shape.  The uterus is 5.4 x 2.6 x 3.5 cm.  No   myometrial abnormalities are seen.  The cervix is unremarkable.  The   endometrium is 0.5 cm in thickness, which is upper limits of normal in a   postmenopausal female with vaginal bleeding.    The right ovary is normal in size, measuring 1.0 x 1.0 x 1.2 cm. No right   ovarian masses are seen. The left ovary is normal in size, measuring 0.9   x 0.4 x 0.8 cm. No left ovarian masses are seen. Doppler evaluation   demonstrates flow to both ovaries with no evidence of torsion.    A small amount of free fluid is seen in the cul-de-sac.      IMPRESSION:   The endometrium measures 0.5 cm in thickness which is upper limits of   normal in a postmenopausal female with vaginal bleeding. Recommend   clinical correlation and suggest a follow-up study.             "Thank you for the opportunity to participate in the care of this   patient."    GABBY JACOB M.D., RADIOLOGY RESIDENT  This document has been electronically signed.  CHANTELLE IRELAND M.D., ATTENDING RADIOLOGIST  This document has been electronically signed. Mar 23 2018  9:38PM                  < end of copied text >

## 2018-03-24 NOTE — CONSULT NOTE ADULT - ASSESSMENT
52y P3 with postmenopausal bleeding s/p receiving heparin during hospital stay. TVUS shows increased endometrial thickness. Pt to f/u outpatient for pap smear and endometrial biopsy. Information about [facility] in [place] given to patient and pts daughter.     d/w Dr. Mei

## 2018-03-25 ENCOUNTER — TRANSCRIPTION ENCOUNTER (OUTPATIENT)
Age: 53
End: 2018-03-25

## 2018-03-25 VITALS
SYSTOLIC BLOOD PRESSURE: 102 MMHG | TEMPERATURE: 98 F | OXYGEN SATURATION: 95 % | DIASTOLIC BLOOD PRESSURE: 66 MMHG | HEART RATE: 63 BPM | RESPIRATION RATE: 17 BRPM

## 2018-03-25 LAB
ALBUMIN SERPL ELPH-MCNC: 2.9 G/DL — LOW (ref 3.3–5)
ALP SERPL-CCNC: 208 U/L — HIGH (ref 40–120)
ALT FLD-CCNC: 59 U/L — HIGH (ref 10–45)
ANION GAP SERPL CALC-SCNC: 12 MMOL/L — SIGNIFICANT CHANGE UP (ref 5–17)
AST SERPL-CCNC: 77 U/L — HIGH (ref 10–40)
BILIRUB SERPL-MCNC: 0.6 MG/DL — SIGNIFICANT CHANGE UP (ref 0.2–1.2)
BUN SERPL-MCNC: 5 MG/DL — LOW (ref 7–23)
CALCIUM SERPL-MCNC: 8.3 MG/DL — LOW (ref 8.4–10.5)
CHLORIDE SERPL-SCNC: 107 MMOL/L — SIGNIFICANT CHANGE UP (ref 96–108)
CO2 SERPL-SCNC: 25 MMOL/L — SIGNIFICANT CHANGE UP (ref 22–31)
CREAT SERPL-MCNC: 0.61 MG/DL — SIGNIFICANT CHANGE UP (ref 0.5–1.3)
GLUCOSE BLDC GLUCOMTR-MCNC: 101 MG/DL — HIGH (ref 70–99)
GLUCOSE BLDC GLUCOMTR-MCNC: 212 MG/DL — HIGH (ref 70–99)
GLUCOSE BLDC GLUCOMTR-MCNC: 302 MG/DL — HIGH (ref 70–99)
GLUCOSE BLDC GLUCOMTR-MCNC: 94 MG/DL — SIGNIFICANT CHANGE UP (ref 70–99)
GLUCOSE SERPL-MCNC: 351 MG/DL — HIGH (ref 70–99)
MAGNESIUM SERPL-MCNC: 1.7 MG/DL — SIGNIFICANT CHANGE UP (ref 1.6–2.6)
PHOSPHATE SERPL-MCNC: 3 MG/DL — SIGNIFICANT CHANGE UP (ref 2.5–4.5)
POTASSIUM SERPL-MCNC: 3.5 MMOL/L — SIGNIFICANT CHANGE UP (ref 3.5–5.3)
POTASSIUM SERPL-SCNC: 3.5 MMOL/L — SIGNIFICANT CHANGE UP (ref 3.5–5.3)
PROT SERPL-MCNC: 6.9 G/DL — SIGNIFICANT CHANGE UP (ref 6–8.3)
SODIUM SERPL-SCNC: 144 MMOL/L — SIGNIFICANT CHANGE UP (ref 135–145)

## 2018-03-25 RX ORDER — OXYCODONE AND ACETAMINOPHEN 5; 325 MG/1; MG/1
2 TABLET ORAL EVERY 4 HOURS
Qty: 0 | Refills: 0 | Status: DISCONTINUED | OUTPATIENT
Start: 2018-03-25 | End: 2018-03-25

## 2018-03-25 RX ORDER — POTASSIUM CHLORIDE 20 MEQ
40 PACKET (EA) ORAL ONCE
Qty: 0 | Refills: 0 | Status: COMPLETED | OUTPATIENT
Start: 2018-03-25 | End: 2018-03-25

## 2018-03-25 RX ORDER — OXYCODONE AND ACETAMINOPHEN 5; 325 MG/1; MG/1
1 TABLET ORAL EVERY 4 HOURS
Qty: 0 | Refills: 0 | Status: DISCONTINUED | OUTPATIENT
Start: 2018-03-25 | End: 2018-03-25

## 2018-03-25 RX ORDER — POTASSIUM CHLORIDE 20 MEQ
40 PACKET (EA) ORAL
Qty: 0 | Refills: 0 | Status: DISCONTINUED | OUTPATIENT
Start: 2018-03-25 | End: 2018-03-25

## 2018-03-25 RX ADMIN — Medication 4: at 16:58

## 2018-03-25 RX ADMIN — Medication 40 MILLIEQUIVALENT(S): at 12:31

## 2018-03-25 RX ADMIN — AMPICILLIN SODIUM AND SULBACTAM SODIUM 200 GRAM(S): 250; 125 INJECTION, POWDER, FOR SUSPENSION INTRAMUSCULAR; INTRAVENOUS at 05:34

## 2018-03-25 RX ADMIN — SODIUM CHLORIDE 110 MILLILITER(S): 9 INJECTION INTRAMUSCULAR; INTRAVENOUS; SUBCUTANEOUS at 05:35

## 2018-03-25 RX ADMIN — AMPICILLIN SODIUM AND SULBACTAM SODIUM 200 GRAM(S): 250; 125 INJECTION, POWDER, FOR SUSPENSION INTRAMUSCULAR; INTRAVENOUS at 12:31

## 2018-03-25 RX ADMIN — Medication 8: at 12:31

## 2018-03-25 RX ADMIN — HEPARIN SODIUM 5000 UNIT(S): 5000 INJECTION INTRAVENOUS; SUBCUTANEOUS at 13:46

## 2018-03-25 RX ADMIN — AMPICILLIN SODIUM AND SULBACTAM SODIUM 200 GRAM(S): 250; 125 INJECTION, POWDER, FOR SUSPENSION INTRAMUSCULAR; INTRAVENOUS at 00:09

## 2018-03-25 RX ADMIN — HEPARIN SODIUM 5000 UNIT(S): 5000 INJECTION INTRAVENOUS; SUBCUTANEOUS at 05:34

## 2018-03-25 NOTE — PROGRESS NOTE ADULT - SUBJECTIVE AND OBJECTIVE BOX
3/24 / o/n: s/p lap jacob, POC WNL, advanced to LFD , GI recommends 10 days of abx post operatively     plan:   pain control  dvt ppx   CLD   consider LFD for diner   abx for 10 days per GI 3/24 / o/n: s/p lap jacob, POC WNL, advanced to LFD , GI recommends 10 days of abx post operatively       INTERVAL HPI/OVERNIGHT EVENTS:    STATUS POST:  laparoscopic cholecystectomy    POST OPERATIVE DAY #: 1    SUBJECTIVE: Pt seen and examined at bedside. no acute complaints  Flatus: [x ] YES [ ] NO             Bowel Movement: [ ] YES [x ] NO  Pain (0-10):            Pain Control Adequate: [x ] YES [ ] NO  Nausea: [ ] YES [x ] NO            Vomiting: [ ] YES [x ] NO  Diarrhea: [ ] YES [x ] NO         Constipation: [ ] YES [x ] NO     Chest Pain: [ ] YES [x ] NO    SOB:  [ ] YES [x ] NO    MEDICATIONS  (STANDING):  ampicillin/sulbactam  IVPB 3 Gram(s) IV Intermittent every 6 hours  heparin  Injectable 5000 Unit(s) SubCutaneous every 8 hours  insulin glargine Injectable (LANTUS) 12 Unit(s) SubCutaneous at bedtime  insulin lispro (HumaLOG) corrective regimen sliding scale   SubCutaneous every 4 hours    MEDICATIONS  (PRN):  ondansetron Injectable 4 milliGRAM(s) IV Push every 4 hours PRN Nausea and/or Vomiting  oxyCODONE    5 mG/acetaminophen 325 mG 1 Tablet(s) Oral every 4 hours PRN Moderate Pain (4 - 6)  oxyCODONE    5 mG/acetaminophen 325 mG 2 Tablet(s) Oral every 4 hours PRN Severe Pain (7 - 10)      Vital Signs Last 24 Hrs  T(C): 37.2 (25 Mar 2018 10:00), Max: 37.3 (25 Mar 2018 00:23)  T(F): 99 (25 Mar 2018 10:00), Max: 99.1 (25 Mar 2018 00:23)  HR: 72 (25 Mar 2018 10:00) (60 - 77)  BP: 109/71 (25 Mar 2018 10:00) (98/61 - 117/75)  BP(mean): 92 (24 Mar 2018 15:00) (83 - 92)  RR: 17 (25 Mar 2018 10:00) (14 - 20)  SpO2: 97% (25 Mar 2018 10:00) (94% - 98%)    PHYSICAL EXAM:      Constitutional: A&Ox3    Respiratory: non labored breathing, no respiratory distress    Cardiovascular:  RRR    Gastrointestinal: soft non-distended. incisional tenderness                 Incision:    Genitourinary: voiding    Extremities: (-) edema                  I&O's Detail    24 Mar 2018 07:01  -  25 Mar 2018 07:00  --------------------------------------------------------  IN:    Oral Fluid: 1460 mL    Other: 940 mL    sodium chloride 0.9%: 1930 mL    Solution: 100 mL    Solution: 200 mL  Total IN: 4630 mL    OUT:    Voided: 3425 mL  Total OUT: 3425 mL    Total NET: 1205 mL      25 Mar 2018 07:01  -  25 Mar 2018 13:39  --------------------------------------------------------  IN:    Oral Fluid: 480 mL  Total IN: 480 mL    OUT:    Voided: 500 mL  Total OUT: 500 mL    Total NET: -20 mL          LABS:                        12.3   9.0   )-----------( 200      ( 24 Mar 2018 06:11 )             37.6     03-25    144  |  107  |  5<L>  ----------------------------<  351<H>  3.5   |  25  |  0.61    Ca    8.3<L>      25 Mar 2018 10:48  Phos  3.0     03-25  Mg     1.7     03-25    TPro  6.9  /  Alb  2.9<L>  /  TBili  0.6  /  DBili  x   /  AST  77<H>  /  ALT  59<H>  /  AlkPhos  208<H>  03-25    PT/INR - ( 24 Mar 2018 06:11 )   PT: 12.3 sec;   INR: 1.11          PTT - ( 24 Mar 2018 06:11 )  PTT:38.9 sec      RADIOLOGY & ADDITIONAL STUDIES:  52 F hx DM POD 1 from laparoscopic cholecystectomy for cholangitis  pain control  dvt ppx   LFD  consider LFD for diner   abx for 10 days per GI

## 2018-03-25 NOTE — DISCHARGE NOTE ADULT - CARE PLAN
Principal Discharge DX:	Cholangitis  Goal:	recovery  Assessment and plan of treatment:	Follow up with Dr. Puente in 1-2 weeks. Call the office at [phone] to schedule your appointment. You should follow up with Dr Tam in 2-3 weeks for ERCP and stent removal. Please call his office at [phone] to make an appointment. Please schedule an appointment with your outpatient PMD for diabetes management.  Instructions for follow-up, activity and diet:	Please resume all regular home medications unless specifically advised not to take a particular medication. Also, please take any new medications as prescribed.  Please get plenty of rest, continue to ambulate several times per day, and drink adequate amounts of fluids. Avoid lifting weights greater than 5-10 lbs until you follow-up with your surgeon, who will instruct you further regarding activity restrictions.  Avoid driving or operating heavy machinery while taking pain medications. You may take percocet of tylenol every 6 hours as needed for pain. Please keep track of how much Tylenol (acetaminophen) you are taking. Do not take more than 4,000 mg per day. Be aware that Percocet and Tylenol #3 has tylenol in it.   Percocet may cause constipation, you may take colace and senna while taking percocet.

## 2018-03-25 NOTE — DISCHARGE NOTE ADULT - CARE PROVIDER_API CALL
Kacey Puente), Surgery  [address] [place] [address]  Phone: [phone]  Fax: [phone]    Bryan Tam), Gastroenterology  [address] [place] [address]  Phone: [phone]  Fax: [phone]

## 2018-03-25 NOTE — DISCHARGE NOTE ADULT - MEDICATION SUMMARY - MEDICATIONS TO TAKE
I will START or STAY ON the medications listed below when I get home from the hospital:    Percocet 5/325 oral tablet  -- 1 tab(s) by mouth every 6 hours, As Needed -Severe Pain (7 - 10) MDD:8  -- Indication: For Severe pain    metFORMIN 500 mg oral tablet, extended release  -- 1 tab(s) by mouth once a day  -- Indication: For DM    Augmentin 875 mg-125 mg oral tablet  -- 1 tab(s) by mouth 2 times a day   -- Finish all this medication unless otherwise directed by prescriber.  Take with food or milk.    -- Indication: For Cholangitis

## 2018-03-25 NOTE — DISCHARGE NOTE ADULT - INSTRUCTIONS
Please continue to eat Low fat, consistent carbohydrate diet as tolerated. Drink plenty of fluids. No heavy lifting, shower only 48 hrs after surgery, no bath tub and no swimming pool use until further instructions from Dr. Puente. Report to Dr. Puente if you have fever, pain not relieved with prescribed medication, nausea/vomiting, bleeding or discharge coming from surgical incision site steri- strips to surgical sites will fall off by itself after a few showers. please do not pull it off.. Call Dr. Puente if you  have any questions or concerns

## 2018-03-25 NOTE — DISCHARGE NOTE ADULT - CARE PROVIDERS DIRECT ADDRESSES
[email or web address]

## 2018-03-25 NOTE — DISCHARGE NOTE ADULT - PLAN OF CARE
recovery Follow up with Dr. Puente in 1-2 weeks. Call the office at [phone] to schedule your appointment. You should follow up with Dr Tam in 2-3 weeks for ERCP and stent removal. Please call his office at [phone] to make an appointment. Please schedule an appointment with your outpatient PMD for diabetes management.  Instructions for follow-up, activity and diet:	Please resume all regular home medications unless specifically advised not to take a particular medication. Also, please take any new medications as prescribed.  Please get plenty of rest, continue to ambulate several times per day, and drink adequate amounts of fluids. Avoid lifting weights greater than 5-10 lbs until you follow-up with your surgeon, who will instruct you further regarding activity restrictions.  Avoid driving or operating heavy machinery while taking pain medications. You may take percocet of tylenol every 6 hours as needed for pain. Please keep track of how much Tylenol (acetaminophen) you are taking. Do not take more than 4,000 mg per day. Be aware that Percocet and Tylenol #3 has tylenol in it.   Percocet may cause constipation, you may take colace and senna while taking percocet.

## 2018-03-25 NOTE — DISCHARGE NOTE ADULT - PATIENT PORTAL LINK FT
You can access the [facility] Patient Portal, offered by [facility], by registering with the following website: [email or web address]

## 2018-03-25 NOTE — DISCHARGE NOTE ADULT - NS AS ACTIVITY OBS
Stairs allowed/No Heavy lifting/straining/Showering allowed/Walking-Outdoors allowed/Walking-Indoors allowed

## 2018-03-25 NOTE — DISCHARGE NOTE ADULT - HOSPITAL COURSE
HPI:  52YF w/ pmhx of ?DM (prev. hba1c of ?8.1%) and intermittent hypotension of unknown etiology (never been worked up by a cardiologist) presented to [facility] after being found unconscious on her couch and 'green'. She has had a 6mo hx of epigastric pain, non-radiating, dull, with inability to swallow solid foods and reduced appetite associated w/ n/v and 8-10 pound weight loss. Able to tolerate fluids. No diarrhea or change in stool habits. Feels as if food gets 'stuck' in epigastric region. No significant pain with swallowing in the upper esophageal region. Also notable is a history of intermittent swinging f/c, with chills predominantly at night requiring multiple blankets and painful lymph nodes in the neck bilaterally. Recent breast biopsy in Feb. found to be benign.     At [facility] patient with severe epigastric pain found to have elevated LFTs 214/112, lipase of 682, Tbili 2.8, alk phos 457, and CT a/p c/f choledoco w/ 3 small distal calculi in common bile duct with mild extrahepatic ductal dilation w/ CBD measure 1cm. Christina-pancreatic tail edema.     Had refractory hypotension requiring several L NS w/ shaniqua of 80 systolic and peripheral levo and tachycardia to 140s. No leukocytosis or fever, but lactate of 7.6. Planned for ERCP on Wed. but determined to be too acute and transferred to [facility] for earlier ERCP.     She underwent ERCP with stent placement on 3/22 and laparoscopic cholecystectomy on 3/24 which were well tolerated. She is currently afebrile, hemodynamically stable, tolerating LF diet and is currently ready for discharge on 10 D of augmentin.

## 2018-03-25 NOTE — PROGRESS NOTE ADULT - ATTENDING COMMENTS
Patient seen and examined with house-staff during bedside rounds.  Resident note read, including vitals, physical findings, laboratory data, and radiological reports.   Revisions included below.  Direct personal management at bed side and extensive interpretation of the data.  Plan was outlined and discussed in details with the housestaff.  Decision making of high complexity  Action taken for acute disease activity to reflect the level of care provided:  - medication reconciliation  - review laboratory data  -followed on the ERCP findings  - evaluated septic shock and management  - continue antibiotic  - followed on LFT  - follow on cultures  - fluid resuscitation
Patient seen and examined at bedside.  Abdomen soft incisions clean/dry/intact.    Labs reviewed.  Discharge home today
Patient seen and examined with house-staff during bedside rounds.  Resident note read, including vitals, physical findings, laboratory data, and radiological reports.   Revisions included below.  Direct personal management at bed side and extensive interpretation of the data.  Plan was outlined and discussed in details with the housestaff.  Decision making of high complexity  Action taken for acute disease activity to reflect the level of care provided:  - medication reconciliation  - review laboratory data  - discussed with GI, surgery, and family  - hemodynamically stable  - continue antibiotic  - DC a line  - DC insulin drip  - start NPH  - patient is scheduled for surgery possible this admission  - OOB
Patient seen and examined at bedside.  Feeling better.  Abdomen soft NT ND.  Labs improving.    When stable to be transferred out of ICU, can be transferred to surgery in preparation for laparoscopic cholecystectomy.
Patient seen and examined at bedside.  Reports feeling much better.  Abdomen soft NT ND.    For laparoscopic cholecystectomy 3/24.
Patient seen and examined with house-staff during bedside rounds.  Resident note read, including vitals, physical findings, laboratory data, and radiological reports.   Revisions included below.  Direct personal management at bed side and extensive interpretation of the data.  Plan was outlined and discussed in details with the housestaff.  Decision making of high complexity  Action taken for acute disease activity to reflect the level of care provided:  - medication reconciliation  - review laboratory data  - hemodynamically stable  - Off pressors  - continue antibiotic  - for repeat ERCP for source control  - Repeat Blood culture  - Off IV fluid  - repeated labs  - discussed with Dr Tam
Patient seen and examined with house-staff during bedside rounds.  Resident note read, including vitals, physical findings, laboratory data, and radiological reports.   Revisions included below.  Direct personal management at bed side and extensive interpretation of the data.  Plan was outlined and discussed in details with the housestaff.  Decision making of high complexity  Action taken for acute disease activity to reflect the level of care provided:  - medication reconciliation  - review laboratory data  - discussed with GI, surgery, and family  - hemodynamically stable  - continue antibiotic  cleared for surgery  - DC a line  - DC insulin drip  - start NPH  - patient is scheduled for surgery possible this admission  - OOB  - gyn consult

## 2018-03-26 LAB
CULTURE RESULTS: SIGNIFICANT CHANGE UP
ORGANISM # SPEC MICROSCOPIC CNT: SIGNIFICANT CHANGE UP
SPECIMEN SOURCE: SIGNIFICANT CHANGE UP

## 2018-03-27 LAB
CULTURE RESULTS: SIGNIFICANT CHANGE UP
SPECIMEN SOURCE: SIGNIFICANT CHANGE UP

## 2018-03-28 LAB — SURGICAL PATHOLOGY STUDY: SIGNIFICANT CHANGE UP

## 2018-03-28 PROCEDURE — 87633 RESP VIRUS 12-25 TARGETS: CPT

## 2018-03-28 PROCEDURE — 84443 ASSAY THYROID STIM HORMONE: CPT

## 2018-03-28 PROCEDURE — 83605 ASSAY OF LACTIC ACID: CPT

## 2018-03-28 PROCEDURE — 85610 PROTHROMBIN TIME: CPT

## 2018-03-28 PROCEDURE — 82330 ASSAY OF CALCIUM: CPT

## 2018-03-28 PROCEDURE — 82550 ASSAY OF CK (CPK): CPT

## 2018-03-28 PROCEDURE — 86901 BLOOD TYPING SEROLOGIC RH(D): CPT

## 2018-03-28 PROCEDURE — 85027 COMPLETE CBC AUTOMATED: CPT

## 2018-03-28 PROCEDURE — 82553 CREATINE MB FRACTION: CPT

## 2018-03-28 PROCEDURE — C1769: CPT

## 2018-03-28 PROCEDURE — 76830 TRANSVAGINAL US NON-OB: CPT

## 2018-03-28 PROCEDURE — 76856 US EXAM PELVIC COMPLETE: CPT

## 2018-03-28 PROCEDURE — C1726: CPT

## 2018-03-28 PROCEDURE — 84132 ASSAY OF SERUM POTASSIUM: CPT

## 2018-03-28 PROCEDURE — 82803 BLOOD GASES ANY COMBINATION: CPT

## 2018-03-28 PROCEDURE — 74328 X-RAY BILE DUCT ENDOSCOPY: CPT

## 2018-03-28 PROCEDURE — 85025 COMPLETE CBC W/AUTO DIFF WBC: CPT

## 2018-03-28 PROCEDURE — 87581 M.PNEUMON DNA AMP PROBE: CPT

## 2018-03-28 PROCEDURE — 87150 DNA/RNA AMPLIFIED PROBE: CPT

## 2018-03-28 PROCEDURE — 85730 THROMBOPLASTIN TIME PARTIAL: CPT

## 2018-03-28 PROCEDURE — 82962 GLUCOSE BLOOD TEST: CPT

## 2018-03-28 PROCEDURE — 87081 CULTURE SCREEN ONLY: CPT

## 2018-03-28 PROCEDURE — 87486 CHLMYD PNEUM DNA AMP PROBE: CPT

## 2018-03-28 PROCEDURE — C1889: CPT

## 2018-03-28 PROCEDURE — 84295 ASSAY OF SERUM SODIUM: CPT

## 2018-03-28 PROCEDURE — 83036 HEMOGLOBIN GLYCOSYLATED A1C: CPT

## 2018-03-28 PROCEDURE — 82248 BILIRUBIN DIRECT: CPT

## 2018-03-28 PROCEDURE — 88304 TISSUE EXAM BY PATHOLOGIST: CPT

## 2018-03-28 PROCEDURE — 87880 STREP A ASSAY W/OPTIC: CPT

## 2018-03-28 PROCEDURE — 83690 ASSAY OF LIPASE: CPT

## 2018-03-28 PROCEDURE — 83735 ASSAY OF MAGNESIUM: CPT

## 2018-03-28 PROCEDURE — 80061 LIPID PANEL: CPT

## 2018-03-28 PROCEDURE — 87798 DETECT AGENT NOS DNA AMP: CPT

## 2018-03-28 PROCEDURE — C2617: CPT

## 2018-03-28 PROCEDURE — 80048 BASIC METABOLIC PNL TOTAL CA: CPT

## 2018-03-28 PROCEDURE — 80053 COMPREHEN METABOLIC PANEL: CPT

## 2018-03-28 PROCEDURE — 87186 SC STD MICRODIL/AGAR DIL: CPT

## 2018-03-28 PROCEDURE — 81001 URINALYSIS AUTO W/SCOPE: CPT

## 2018-03-28 PROCEDURE — 84484 ASSAY OF TROPONIN QUANT: CPT

## 2018-03-28 PROCEDURE — 86850 RBC ANTIBODY SCREEN: CPT

## 2018-03-28 PROCEDURE — 87040 BLOOD CULTURE FOR BACTERIA: CPT

## 2018-03-28 PROCEDURE — 71045 X-RAY EXAM CHEST 1 VIEW: CPT

## 2018-03-28 PROCEDURE — 36415 COLL VENOUS BLD VENIPUNCTURE: CPT

## 2018-03-28 PROCEDURE — 84100 ASSAY OF PHOSPHORUS: CPT

## 2018-03-28 PROCEDURE — 86900 BLOOD TYPING SEROLOGIC ABO: CPT

## 2018-04-03 DIAGNOSIS — Z28.21 IMMUNIZATION NOT CARRIED OUT BECAUSE OF PATIENT REFUSAL: ICD-10-CM

## 2018-04-03 DIAGNOSIS — N95.0 POSTMENOPAUSAL BLEEDING: ICD-10-CM

## 2018-04-03 DIAGNOSIS — J90 PLEURAL EFFUSION, NOT ELSEWHERE CLASSIFIED: ICD-10-CM

## 2018-04-03 DIAGNOSIS — D64.9 ANEMIA, UNSPECIFIED: ICD-10-CM

## 2018-04-03 DIAGNOSIS — B96.89 OTHER SPECIFIED BACTERIAL AGENTS AS THE CAUSE OF DISEASES CLASSIFIED ELSEWHERE: ICD-10-CM

## 2018-04-03 DIAGNOSIS — K21.9 GASTRO-ESOPHAGEAL REFLUX DISEASE WITHOUT ESOPHAGITIS: ICD-10-CM

## 2018-04-03 DIAGNOSIS — R65.21 SEVERE SEPSIS WITH SEPTIC SHOCK: ICD-10-CM

## 2018-04-03 DIAGNOSIS — K80.30 CALCULUS OF BILE DUCT WITH CHOLANGITIS, UNSPECIFIED, WITHOUT OBSTRUCTION: ICD-10-CM

## 2018-04-03 DIAGNOSIS — Z91.013 ALLERGY TO SEAFOOD: ICD-10-CM

## 2018-04-03 DIAGNOSIS — R35.0 FREQUENCY OF MICTURITION: ICD-10-CM

## 2018-04-03 DIAGNOSIS — E11.65 TYPE 2 DIABETES MELLITUS WITH HYPERGLYCEMIA: ICD-10-CM

## 2018-04-03 DIAGNOSIS — K22.2 ESOPHAGEAL OBSTRUCTION: ICD-10-CM

## 2018-04-03 DIAGNOSIS — K57.10 DIVERTICULOSIS OF SMALL INTESTINE WITHOUT PERFORATION OR ABSCESS WITHOUT BLEEDING: ICD-10-CM

## 2018-04-03 DIAGNOSIS — K44.9 DIAPHRAGMATIC HERNIA WITHOUT OBSTRUCTION OR GANGRENE: ICD-10-CM

## 2018-04-03 DIAGNOSIS — B96.1 KLEBSIELLA PNEUMONIAE [K. PNEUMONIAE] AS THE CAUSE OF DISEASES CLASSIFIED ELSEWHERE: ICD-10-CM

## 2018-04-03 DIAGNOSIS — E87.0 HYPEROSMOLALITY AND HYPERNATREMIA: ICD-10-CM

## 2018-04-03 DIAGNOSIS — Z79.4 LONG TERM (CURRENT) USE OF INSULIN: ICD-10-CM

## 2018-04-03 DIAGNOSIS — A41.59 OTHER GRAM-NEGATIVE SEPSIS: ICD-10-CM

## 2018-04-03 DIAGNOSIS — R13.10 DYSPHAGIA, UNSPECIFIED: ICD-10-CM

## 2018-04-03 DIAGNOSIS — R10.9 UNSPECIFIED ABDOMINAL PAIN: ICD-10-CM

## 2018-04-03 DIAGNOSIS — K80.33 CALCULUS OF BILE DUCT WITH ACUTE CHOLANGITIS WITH OBSTRUCTION: ICD-10-CM

## 2018-04-06 PROBLEM — Z00.00 ENCOUNTER FOR PREVENTIVE HEALTH EXAMINATION: Status: ACTIVE | Noted: 2018-04-06

## 2018-04-06 PROBLEM — E11.9 TYPE 2 DIABETES MELLITUS WITHOUT COMPLICATIONS: Chronic | Status: ACTIVE | Noted: 2018-03-20

## 2018-04-11 ENCOUNTER — APPOINTMENT (OUTPATIENT)
Dept: GASTROENTEROLOGY | Facility: HOSPITAL | Age: 53
End: 2018-04-11

## 2018-04-11 ENCOUNTER — OUTPATIENT (OUTPATIENT)
Dept: OUTPATIENT SERVICES | Facility: HOSPITAL | Age: 53
LOS: 1 days | Discharge: HOME | End: 2018-04-11
Payer: COMMERCIAL

## 2018-04-11 LAB — GLUCOSE BLDC GLUCOMTR-MCNC: 138 MG/DL — HIGH (ref 70–99)

## 2018-04-11 PROCEDURE — 43264 ERCP REMOVE DUCT CALCULI: CPT

## 2018-04-11 PROCEDURE — 43275 ERCP REMOVE FORGN BODY DUCT: CPT

## 2018-04-11 PROCEDURE — 82962 GLUCOSE BLOOD TEST: CPT

## 2018-05-30 ENCOUNTER — APPOINTMENT (OUTPATIENT)
Dept: ENDOCRINOLOGY | Facility: CLINIC | Age: 53
End: 2018-05-30

## 2020-10-27 ENCOUNTER — APPOINTMENT (OUTPATIENT)
Dept: GASTROENTEROLOGY | Facility: CLINIC | Age: 55
End: 2020-10-27

## 2021-02-24 ENCOUNTER — INPATIENT (INPATIENT)
Facility: HOSPITAL | Age: 56
LOS: 9 days | Discharge: HOME | DRG: 871 | End: 2021-03-06
Attending: INTERNAL MEDICINE | Admitting: INTERNAL MEDICINE
Payer: MEDICAID

## 2021-02-24 VITALS
HEART RATE: 97 BPM | HEIGHT: 62 IN | RESPIRATION RATE: 24 BRPM | WEIGHT: 139.99 LBS | SYSTOLIC BLOOD PRESSURE: 103 MMHG | OXYGEN SATURATION: 81 % | TEMPERATURE: 100 F | DIASTOLIC BLOOD PRESSURE: 70 MMHG

## 2021-02-24 DIAGNOSIS — R63.8 OTHER SYMPTOMS AND SIGNS CONCERNING FOOD AND FLUID INTAKE: ICD-10-CM

## 2021-02-24 DIAGNOSIS — R74.01 ELEVATION OF LEVELS OF LIVER TRANSAMINASE LEVELS: ICD-10-CM

## 2021-02-24 DIAGNOSIS — E11.9 TYPE 2 DIABETES MELLITUS WITHOUT COMPLICATIONS: ICD-10-CM

## 2021-02-24 DIAGNOSIS — J96.01 ACUTE RESPIRATORY FAILURE WITH HYPOXIA: ICD-10-CM

## 2021-02-24 DIAGNOSIS — R07.81 PLEURODYNIA: ICD-10-CM

## 2021-02-24 DIAGNOSIS — U07.1 COVID-19: ICD-10-CM

## 2021-02-24 LAB
ALBUMIN SERPL ELPH-MCNC: 3.3 G/DL — SIGNIFICANT CHANGE UP (ref 3.3–5)
ALP SERPL-CCNC: 166 U/L — HIGH (ref 40–120)
ALT FLD-CCNC: 42 U/L — SIGNIFICANT CHANGE UP (ref 10–45)
ANION GAP SERPL CALC-SCNC: 14 MMOL/L — SIGNIFICANT CHANGE UP (ref 5–17)
APTT BLD: 31.7 SEC — SIGNIFICANT CHANGE UP (ref 27.5–35.5)
AST SERPL-CCNC: 127 U/L — HIGH (ref 10–40)
BASE EXCESS BLDV CALC-SCNC: 1.2 MMOL/L — SIGNIFICANT CHANGE UP
BASE EXCESS BLDV CALC-SCNC: 1.5 MMOL/L — SIGNIFICANT CHANGE UP
BASOPHILS # BLD AUTO: 0 K/UL — SIGNIFICANT CHANGE UP (ref 0–0.2)
BASOPHILS NFR BLD AUTO: 0 % — SIGNIFICANT CHANGE UP (ref 0–2)
BILIRUB SERPL-MCNC: 0.4 MG/DL — SIGNIFICANT CHANGE UP (ref 0.2–1.2)
BUN SERPL-MCNC: 5 MG/DL — LOW (ref 7–23)
CA-I SERPL-SCNC: 1.08 MMOL/L — LOW (ref 1.12–1.3)
CA-I SERPL-SCNC: 1.11 MMOL/L — LOW (ref 1.12–1.3)
CALCIUM SERPL-MCNC: 8.3 MG/DL — LOW (ref 8.4–10.5)
CHLORIDE SERPL-SCNC: 101 MMOL/L — SIGNIFICANT CHANGE UP (ref 96–108)
CO2 SERPL-SCNC: 25 MMOL/L — SIGNIFICANT CHANGE UP (ref 22–31)
CREAT SERPL-MCNC: 0.61 MG/DL — SIGNIFICANT CHANGE UP (ref 0.5–1.3)
CRP SERPL-MCNC: 7.79 MG/DL — HIGH (ref 0–0.4)
D DIMER BLD IA.RAPID-MCNC: 289 NG/ML DDU — HIGH
EOSINOPHIL # BLD AUTO: 0 K/UL — SIGNIFICANT CHANGE UP (ref 0–0.5)
EOSINOPHIL NFR BLD AUTO: 0 % — SIGNIFICANT CHANGE UP (ref 0–6)
FERRITIN SERPL-MCNC: 363 NG/ML — HIGH (ref 15–150)
GAS PNL BLDV: 131 MMOL/L — LOW (ref 138–146)
GAS PNL BLDV: 141 MMOL/L — SIGNIFICANT CHANGE UP (ref 138–146)
GAS PNL BLDV: SIGNIFICANT CHANGE UP
GAS PNL BLDV: SIGNIFICANT CHANGE UP
GGT SERPL-CCNC: 392 U/L — HIGH (ref 8–40)
GIANT PLATELETS BLD QL SMEAR: PRESENT — SIGNIFICANT CHANGE UP
GLUCOSE BLDC GLUCOMTR-MCNC: 165 MG/DL — HIGH (ref 70–99)
GLUCOSE BLDC GLUCOMTR-MCNC: 191 MG/DL — HIGH (ref 70–99)
GLUCOSE SERPL-MCNC: 180 MG/DL — HIGH (ref 70–99)
HAV IGM SER-ACNC: SIGNIFICANT CHANGE UP
HBV CORE IGM SER-ACNC: SIGNIFICANT CHANGE UP
HBV SURFACE AG SER-ACNC: SIGNIFICANT CHANGE UP
HCO3 BLDV-SCNC: 26 MMOL/L — SIGNIFICANT CHANGE UP (ref 20–27)
HCO3 BLDV-SCNC: 26 MMOL/L — SIGNIFICANT CHANGE UP (ref 20–27)
HCT VFR BLD CALC: 45.3 % — HIGH (ref 34.5–45)
HCV AB S/CO SERPL IA: 0.14 S/CO — SIGNIFICANT CHANGE UP
HCV AB SERPL-IMP: SIGNIFICANT CHANGE UP
HGB BLD-MCNC: 14.7 G/DL — SIGNIFICANT CHANGE UP (ref 11.5–15.5)
HIV 1+2 AB+HIV1 P24 AG SERPL QL IA: SIGNIFICANT CHANGE UP
INR BLD: 1.02 — SIGNIFICANT CHANGE UP (ref 0.88–1.16)
LACTATE SERPL-SCNC: 1.5 MMOL/L — SIGNIFICANT CHANGE UP (ref 0.5–2)
LACTATE SERPL-SCNC: 3.2 MMOL/L — HIGH (ref 0.5–2)
LYMPHOCYTES # BLD AUTO: 0.91 K/UL — LOW (ref 1–3.3)
LYMPHOCYTES # BLD AUTO: 13.9 % — SIGNIFICANT CHANGE UP (ref 13–44)
MAGNESIUM SERPL-MCNC: 2.1 MG/DL — SIGNIFICANT CHANGE UP (ref 1.6–2.6)
MANUAL SMEAR VERIFICATION: SIGNIFICANT CHANGE UP
MCHC RBC-ENTMCNC: 29.5 PG — SIGNIFICANT CHANGE UP (ref 27–34)
MCHC RBC-ENTMCNC: 32.5 GM/DL — SIGNIFICANT CHANGE UP (ref 32–36)
MCV RBC AUTO: 90.8 FL — SIGNIFICANT CHANGE UP (ref 80–100)
MONOCYTES # BLD AUTO: 0.34 K/UL — SIGNIFICANT CHANGE UP (ref 0–0.9)
MONOCYTES NFR BLD AUTO: 5.2 % — SIGNIFICANT CHANGE UP (ref 2–14)
NEUTROPHILS # BLD AUTO: 5.17 K/UL — SIGNIFICANT CHANGE UP (ref 1.8–7.4)
NEUTROPHILS NFR BLD AUTO: 79.1 % — HIGH (ref 43–77)
NT-PROBNP SERPL-SCNC: 68 PG/ML — SIGNIFICANT CHANGE UP (ref 0–300)
OVALOCYTES BLD QL SMEAR: SLIGHT — SIGNIFICANT CHANGE UP
PCO2 BLDV: 38 MMHG — LOW (ref 41–51)
PCO2 BLDV: 43 MMHG — SIGNIFICANT CHANGE UP (ref 41–51)
PH BLDV: 7.4 — SIGNIFICANT CHANGE UP (ref 7.32–7.43)
PH BLDV: 7.44 — HIGH (ref 7.32–7.43)
PHOSPHATE SERPL-MCNC: 3.5 MG/DL — SIGNIFICANT CHANGE UP (ref 2.5–4.5)
PLAT MORPH BLD: NORMAL — SIGNIFICANT CHANGE UP
PLATELET # BLD AUTO: 212 K/UL — SIGNIFICANT CHANGE UP (ref 150–400)
PO2 BLDV: 29 MMHG — SIGNIFICANT CHANGE UP
PO2 BLDV: 47 MMHG — SIGNIFICANT CHANGE UP
POLYCHROMASIA BLD QL SMEAR: SLIGHT — SIGNIFICANT CHANGE UP
POTASSIUM BLDV-SCNC: 3.5 MMOL/L — SIGNIFICANT CHANGE UP (ref 3.5–4.9)
POTASSIUM BLDV-SCNC: 3.8 MMOL/L — SIGNIFICANT CHANGE UP (ref 3.5–4.9)
POTASSIUM SERPL-MCNC: 3.5 MMOL/L — SIGNIFICANT CHANGE UP (ref 3.5–5.3)
POTASSIUM SERPL-SCNC: 3.5 MMOL/L — SIGNIFICANT CHANGE UP (ref 3.5–5.3)
PROCALCITONIN SERPL-MCNC: 0.34 NG/ML — HIGH (ref 0.02–0.1)
PROT SERPL-MCNC: 9.1 G/DL — HIGH (ref 6–8.3)
PROTHROM AB SERPL-ACNC: 12.2 SEC — SIGNIFICANT CHANGE UP (ref 10.6–13.6)
RAPID RVP RESULT: DETECTED
RBC # BLD: 4.99 M/UL — SIGNIFICANT CHANGE UP (ref 3.8–5.2)
RBC # FLD: 14.6 % — HIGH (ref 10.3–14.5)
RBC BLD AUTO: ABNORMAL
SAO2 % BLDV: 51 % — SIGNIFICANT CHANGE UP
SAO2 % BLDV: 83 % — SIGNIFICANT CHANGE UP
SARS-COV-2 RNA SPEC QL NAA+PROBE: DETECTED
SARS-COV-2 RNA SPEC QL NAA+PROBE: DETECTED
SMUDGE CELLS # BLD: PRESENT — SIGNIFICANT CHANGE UP
SODIUM SERPL-SCNC: 140 MMOL/L — SIGNIFICANT CHANGE UP (ref 135–145)
SPHEROCYTES BLD QL SMEAR: SLIGHT — SIGNIFICANT CHANGE UP
TROPONIN T SERPL-MCNC: <0.01 NG/ML — SIGNIFICANT CHANGE UP (ref 0–0.01)
VARIANT LYMPHS # BLD: 1.8 % — SIGNIFICANT CHANGE UP (ref 0–6)
WBC # BLD: 6.54 K/UL — SIGNIFICANT CHANGE UP (ref 3.8–10.5)
WBC # FLD AUTO: 6.54 K/UL — SIGNIFICANT CHANGE UP (ref 3.8–10.5)

## 2021-02-24 PROCEDURE — 99285 EMERGENCY DEPT VISIT HI MDM: CPT

## 2021-02-24 PROCEDURE — 93010 ELECTROCARDIOGRAM REPORT: CPT

## 2021-02-24 PROCEDURE — 99223 1ST HOSP IP/OBS HIGH 75: CPT | Mod: GC

## 2021-02-24 PROCEDURE — 71045 X-RAY EXAM CHEST 1 VIEW: CPT | Mod: 26

## 2021-02-24 RX ORDER — SODIUM CHLORIDE 9 MG/ML
1000 INJECTION, SOLUTION INTRAVENOUS
Refills: 0 | Status: DISCONTINUED | OUTPATIENT
Start: 2021-02-24 | End: 2021-03-06

## 2021-02-24 RX ORDER — SODIUM CHLORIDE 9 MG/ML
500 INJECTION INTRAMUSCULAR; INTRAVENOUS; SUBCUTANEOUS ONCE
Refills: 0 | Status: COMPLETED | OUTPATIENT
Start: 2021-02-24 | End: 2021-02-24

## 2021-02-24 RX ORDER — DEXAMETHASONE 0.5 MG/5ML
6 ELIXIR ORAL ONCE
Refills: 0 | Status: COMPLETED | OUTPATIENT
Start: 2021-02-24 | End: 2021-02-24

## 2021-02-24 RX ORDER — ENOXAPARIN SODIUM 100 MG/ML
40 INJECTION SUBCUTANEOUS EVERY 24 HOURS
Refills: 0 | Status: DISCONTINUED | OUTPATIENT
Start: 2021-02-24 | End: 2021-03-06

## 2021-02-24 RX ORDER — DEXTROSE 50 % IN WATER 50 %
25 SYRINGE (ML) INTRAVENOUS ONCE
Refills: 0 | Status: DISCONTINUED | OUTPATIENT
Start: 2021-02-24 | End: 2021-03-06

## 2021-02-24 RX ORDER — REMDESIVIR 5 MG/ML
INJECTION INTRAVENOUS
Refills: 0 | Status: COMPLETED | OUTPATIENT
Start: 2021-02-24 | End: 2021-02-28

## 2021-02-24 RX ORDER — ACETAMINOPHEN 500 MG
975 TABLET ORAL ONCE
Refills: 0 | Status: COMPLETED | OUTPATIENT
Start: 2021-02-24 | End: 2021-02-24

## 2021-02-24 RX ORDER — REMDESIVIR 5 MG/ML
200 INJECTION INTRAVENOUS EVERY 24 HOURS
Refills: 0 | Status: COMPLETED | OUTPATIENT
Start: 2021-02-24 | End: 2021-02-24

## 2021-02-24 RX ORDER — REMDESIVIR 5 MG/ML
100 INJECTION INTRAVENOUS EVERY 24 HOURS
Refills: 0 | Status: COMPLETED | OUTPATIENT
Start: 2021-02-25 | End: 2021-02-28

## 2021-02-24 RX ORDER — INSULIN LISPRO 100/ML
VIAL (ML) SUBCUTANEOUS
Refills: 0 | Status: DISCONTINUED | OUTPATIENT
Start: 2021-02-24 | End: 2021-03-06

## 2021-02-24 RX ORDER — DEXTROSE 50 % IN WATER 50 %
12.5 SYRINGE (ML) INTRAVENOUS ONCE
Refills: 0 | Status: DISCONTINUED | OUTPATIENT
Start: 2021-02-24 | End: 2021-03-06

## 2021-02-24 RX ORDER — DEXAMETHASONE 0.5 MG/5ML
6 ELIXIR ORAL EVERY 24 HOURS
Refills: 0 | Status: DISCONTINUED | OUTPATIENT
Start: 2021-02-25 | End: 2021-02-26

## 2021-02-24 RX ORDER — DEXTROSE 50 % IN WATER 50 %
15 SYRINGE (ML) INTRAVENOUS ONCE
Refills: 0 | Status: DISCONTINUED | OUTPATIENT
Start: 2021-02-24 | End: 2021-03-06

## 2021-02-24 RX ORDER — GLUCAGON INJECTION, SOLUTION 0.5 MG/.1ML
1 INJECTION, SOLUTION SUBCUTANEOUS ONCE
Refills: 0 | Status: DISCONTINUED | OUTPATIENT
Start: 2021-02-24 | End: 2021-03-06

## 2021-02-24 RX ADMIN — SODIUM CHLORIDE 500 MILLILITER(S): 9 INJECTION INTRAMUSCULAR; INTRAVENOUS; SUBCUTANEOUS at 17:26

## 2021-02-24 RX ADMIN — Medication 6 MILLIGRAM(S): at 13:36

## 2021-02-24 RX ADMIN — SODIUM CHLORIDE 1000 MILLILITER(S): 9 INJECTION INTRAMUSCULAR; INTRAVENOUS; SUBCUTANEOUS at 13:31

## 2021-02-24 RX ADMIN — Medication 2: at 17:15

## 2021-02-24 RX ADMIN — Medication 975 MILLIGRAM(S): at 12:30

## 2021-02-24 RX ADMIN — REMDESIVIR 200 MILLIGRAM(S): 5 INJECTION INTRAVENOUS at 17:17

## 2021-02-24 RX ADMIN — ENOXAPARIN SODIUM 40 MILLIGRAM(S): 100 INJECTION SUBCUTANEOUS at 18:11

## 2021-02-24 RX ADMIN — Medication 2: at 22:07

## 2021-02-24 NOTE — H&P ADULT - PROBLEM SELECTOR PLAN 2
Pt presents with dry cough, shortness of breath for 3 days. Hypoxic to 81% on RA. Imaging showing bilateral ground glass opacities highly suspicious for COVID-19.   - Lymphopenic, ferritin 363, CRP 7.79, procal 0.34, d-dimer 289, AST//42  - f/u COVID-19 PCR  - Dexamethasone 6mg PO x 10 days  - Remdesivir taper x 5 days, monitor LFTs (d/c if LFTs uptrending)  - Continue to trend daily LFTs, ferritin, CRP, d-dimer  - Tylenol PRN for fever  - Supplemental O2, wean as tolerated    Pt reports R upper sternal chest pain with inspiration, and when coughing. Pain is non-radiating, not associated with nausea or diaphoresis  - EKG pending, however pt on monitor, no ischemic changes  - Trop T < 0.01  - continue to monitor Pt presents with dry cough, shortness of breath for 3 days. Hypoxic to 81% on RA. Imaging showing bilateral ground glass opacities highly suspicious for COVID-19.   - Lymphopenic, ferritin 363, CRP 7.79, procal 0.34, d-dimer 289, AST//42  - f/u COVID-19 PCR  - Dexamethasone 6mg PO x 10 days  - Remdesivir taper x 5 days, monitor LFTs (d/c if LFTs uptrending)  - Continue to trend daily LFTs, ferritin, CRP, d-dimer  - Tylenol PRN for fever  - Supplemental O2, wean as tolerated    #Protein gap  Pt with gap 5.8. Likely in the setting of active viral infection w/ COVID  - f/u HIV  - f/u viral hepatitis panel  - holding off on SPEP, UPEP, Immunofixation given low suspicion for gammopathy

## 2021-02-24 NOTE — ED PROVIDER NOTE - CHPI ED SYMPTOMS NEG
Denies other complaints, abdominal pain, vomiting, diarrhea, leg swelling, headache, dizziness, lightheadedness, AC use.

## 2021-02-24 NOTE — ED PROVIDER NOTE - PHYSICAL EXAMINATION
CONSTITUTIONAL: Well appearing, well nourished, awake, alert and in no apparent distress.  HEENT: Head is atraumatic. Eyes clear bilaterally, normal EOMI. Airway patent.  CARDIAC: Normal rate, regular rhythm.  Heart sounds S1, S2.   RESPIRATORY: Breath sounds clear and equal bilaterally. no tachypnea, respiratory distress.   GASTROINTESTINAL: Abdomen soft, non-tender, no guarding, distension.  MUSCULOSKELETAL: Spine appears normal, no midline spinal tenderness, range of motion is not limited, no muscle or joint tenderness. no bony tenderness. no JVD, peripheral edema.   NEUROLOGICAL: Alert and oriented, no focal deficits, no motor or sensory deficits.  SKIN: Skin normal color for race, warm, dry and intact. No evidence of rash.  PSYCHIATRIC: Alert and oriented to person, place, time/situation. normal mood and affect. no apparent risk to self or others. CONSTITUTIONAL:  awake, alert and in no apparent distress.  HEENT: Head is atraumatic. Eyes clear bilaterally, normal EOMI. Airway patent.  CARDIAC: Normal rate, regular rhythm.  Heart sounds S1, S2.   RESPIRATORY: Breath sounds clear and equal bilaterally. NAD, no respiratory distress.   GASTROINTESTINAL: Abdomen soft, non-tender, no guarding, distension.  MUSCULOSKELETAL: Spine appears normal, no midline spinal tenderness, range of motion is not limited, no muscle or joint tenderness. no bony tenderness. no JVD, peripheral edema.   NEUROLOGICAL: Alert and oriented, no focal deficits, no motor or sensory deficits.  SKIN: Skin normal color for race, warm, dry and intact. No evidence of rash.  PSYCHIATRIC: Alert and oriented to person, place, time/situation. normal mood and affect. no apparent risk to self or others.

## 2021-02-24 NOTE — CONSULT NOTE ADULT - SUBJECTIVE AND OBJECTIVE BOX
HPI:  In the ED:  Initial vital signs: T: 100 F, HR: 97, BP: 103/70, R: 24, SpO2: 81% on RA -> RR:22 Spo2 98% on NRB  Labs: significant for D-Dimer 289, Ferritin 383, CRP 7.79,  Lactate 3.2, procal 0.34  Imaging:   CXR: b/l infiltrates  EKG:   Medications: 6mg IVP decadron, tyleonol 975mg, 500cc NS bolus  Consults: none  (2021 13:51)      ADDITIONAL ID HPI:    PAST MEDICAL & SURGICAL HISTORY:  Diabetes     delivery delivered        Home Medications:  metFORMIN 500 mg oral tablet, extended release: 1 tab(s) orally once a day (2021 12:15)      OTHER MEDICATIONS:      ALLERGIES:    Allergies    No Known Drug Allergies  Seafood (Swelling; Rash)    Intolerances        FAMILY HISTORY:  No pertinent family history in first degree relatives        Social Hx:  Social History:      ROS:    VITAL SIGNS:  Vital Signs Last 24 Hrs  T(C): 36.5 (2021 14:54), Max: 37.9 (2021 11:28)  T(F): 97.7 (2021 14:54), Max: 100.3 (2021 11:28)  HR: 72 (2021 14:54) (70 - 97)  BP: 100/62 (2021 14:54) (100/60 - 104/53)  BP(mean): --  RR: 18 (2021 14:54) (18 - 24)  SpO2: 99% (2021 14:54) (81% - 100%)    PHYSICAL EXAM:  General: in NAD, lying comfortably in bed  HEENT: normocephalic, atraumatic; PERRL, anicteric sclera; MMM  Neck: supple, no JVD, no thyromegaly, no lymphadenopathy  Cardiovascular: +S1/S2, RRR, no M/G/R  Respiratory: clear to auscultation B/L; no wheezing, no rales, no rhonchi  Gastrointestinal: soft, NT/ND; +BSx4, no organomegaly  Extremities: WWP; no edema, clubbing or cyanosis  Vascular: 2+ radial, DP/PT pulses B/L  Neurological: AAOx3; no focal deficits    LABS:                        14.7   6.54  )-----------( 212      ( 2021 12:03 )             45.3         140  |  101  |  5<L>  ----------------------------<  180<H>  3.5   |  25  |  0.61    Ca    8.3<L>      2021 12:03    TPro  9.1<H>  /  Alb  3.3  /  TBili  0.4  /  DBili  x   /  AST  127<H>  /  ALT  42  /  AlkPhos  166<H>  02-24    PT/INR - ( 2021 12:03 )   PT: 12.2 sec;   INR: 1.02          PTT - ( 2021 12:03 )  PTT:31.7 sec      RADIOLOGY & ADDITIONAL STUDIES:   HPI:  Patient is a 54 yo F with a PMH of NIDDM (on Metformin 500mg qd) who presents to ED with SOB and chest pain x 3 days. Patient reports that she has been experiencing shortness of breath, progressively worsening with exertion, associated with midsternal chest pain, non-radiating, worse with inspiration. She denies any known sick contacts or recent travels.     Upon ED arrival, T 99.7 (oral) - 100.3 (rectal), HR 70 - 97, -104/53-60, RR 18-24, SpO2: 81% on RA -> % on 15L NRBM. Labs s/f Na 140, K 3.5, Cl 101, CO2 25, BUN/Cr 5/0.61, Glu 180, D-Dimer 289, Ferritin 383, CRP 7.79,  Lactate 3.2, procal 0.34, Trop T <0.01. CXR with  b/l patchy infiltrates, EKG   EKG:   She received 6mg IVP decadron, tyleonol 975mg, 500cc NS bolus    ADDITIONAL ID HPI:    PAST MEDICAL & SURGICAL HISTORY:  Diabetes     delivery delivered        Home Medications:  metFORMIN 500 mg oral tablet, extended release: 1 tab(s) orally once a day (2021 12:15)      OTHER MEDICATIONS:      ALLERGIES:    Allergies    No Known Drug Allergies  Seafood (Swelling; Rash)    Intolerances        FAMILY HISTORY:  No pertinent family history in first degree relatives        Social Hx:  Social History:      ROS:    VITAL SIGNS:  Vital Signs Last 24 Hrs  T(C): 36.5 (2021 14:54), Max: 37.9 (2021 11:28)  T(F): 97.7 (2021 14:54), Max: 100.3 (2021 11:28)  HR: 72 (2021 14:54) (70 - 97)  BP: 100/62 (2021 14:54) (100/60 - 104/53)  BP(mean): --  RR: 18 (2021 14:54) (18 - 24)  SpO2: 99% (2021 14:54) (81% - 100%)    PHYSICAL EXAM:  General: in NAD, lying comfortably in bed  HEENT: normocephalic, atraumatic; PERRL, anicteric sclera; MMM  Neck: supple, no JVD, no thyromegaly, no lymphadenopathy  Cardiovascular: +S1/S2, RRR, no M/G/R  Respiratory: clear to auscultation B/L; no wheezing, no rales, no rhonchi  Gastrointestinal: soft, NT/ND; +BSx4, no organomegaly  Extremities: WWP; no edema, clubbing or cyanosis  Vascular: 2+ radial, DP/PT pulses B/L  Neurological: AAOx3; no focal deficits    LABS:                        14.7   6.54  )-----------( 212      ( 2021 12:03 )             45.3         140  |  101  |  5<L>  ----------------------------<  180<H>  3.5   |  25  |  0.61    Ca    8.3<L>      2021 12:03    TPro  9.1<H>  /  Alb  3.3  /  TBili  0.4  /  DBili  x   /  AST  127<H>  /  ALT  42  /  AlkPhos  166<H>  -24    PT/INR - ( 2021 12:03 )   PT: 12.2 sec;   INR: 1.02          PTT - ( 2021 12:03 )  PTT:31.7 sec      RADIOLOGY & ADDITIONAL STUDIES:   HPI:  Patient is a 56 yo F with a PMH of NIDDM (on Metformin 500mg qd) who presents to ED with SOB and chest pain x 3 days. Patient reports that she has been experiencing shortness of breath, progressively worsening with exertion, associated with midsternal chest pain, non-radiating, worse with inspiration. She denies any known sick contacts or recent travels.     Upon ED arrival, T 99.7 (oral) - 100.3 (rectal), HR 70 - 97, -104/53-60, RR 18-24, SpO2: 81% on RA -> % on 15L NRBM. Labs s/f Na 140, K 3.5, Cl 101, CO2 25, BUN/Cr 5/0.61, Glu 180, D-Dimer 289, Ferritin 383, CRP 7.79,  Lactate 3.2, procal 0.34, Trop T <0.01. CXR with  b/l patchy infiltrates, EKG   She received Decadron 6mg IVP x 1, Tylenol 975mg and 500cc NS bolus x 1.     Subjective: patient seen and examined at bedside, sitting up in bed, in mild distress. Patient on NRBM, satting 100%, however tachypneic to 36-40. Reports sharp R sternal chest pain with inspiration, and non-productive cough. She reports poor appetite for the past 3 days, she has only been drinking. Denies chills, abdominal pain, nausea/vomiting, diarrhea, constipation.    PAST MEDICAL & SURGICAL HISTORY:  Diabetes   delivery  Cholecystectomy  R shoulder surgery    Home Medications:  metFORMIN 500 mg oral tablet, extended release: 1 tab(s) orally once a day (2021 12:15)    ALLERGIES:  No Known Drug Allergies  Seafood (Swelling; Rash)    Intolerances    FAMILY HISTORY:  No pertinent family history in first degree relatives of MI or CVA    Social History: denies cigarette smoking, EtOH use, illicit drug use    ROS: as per HPI    VITAL SIGNS:  Vital Signs Last 24 Hrs  T(C): 36.5 (2021 14:54), Max: 37.9 (2021 11:28)  T(F): 97.7 (2021 14:54), Max: 100.3 (2021 11:28)  HR: 72 (2021 14:54) (70 - 97)  BP: 100/62 (2021 14:54) (100/60 - 104/53)  BP(mean): --  RR: 18 (2021 14:54) (18 - 24)  SpO2: 99% (2021 14:54) (81% - 100%)    PHYSICAL EXAM:  General: sitting up in bed  HEENT: normocephalic, atraumatic; PERRL, anicteric sclera; dry MM  Neck: supple, no JVD, no thyromegaly, no lymphadenopathy  Cardiovascular: +S1/S2, RRR, no M/G/R  Respiratory: bibasilar crackles, increased WOB, with use of accessory muscles  Gastrointestinal: soft, NT/ND; +BSx4, no organomegaly; negative Farias's sign  Extremities: WWP; no edema, clubbing or cyanosis  Vascular: 2+ radial, DP/PT pulses B/L  Neurological: AAOx3; no focal deficits    LABS:                        14.7   6.54  )-----------( 212      ( 2021 12:03 )             45.3     02-24    140  |  101  |  5<L>  ----------------------------<  180<H>  3.5   |  25  |  0.61    Ca    8.3<L>      2021 12:03    TPro  9.1<H>  /  Alb  3.3  /  TBili  0.4  /  DBili  x   /  AST  127<H>  /  ALT  42  /  AlkPhos  166<H>  02-24    PT/INR - ( 2021 12:03 )   PT: 12.2 sec;   INR: 1.02          PTT - ( 2021 12:03 )  PTT:31.7 sec    RADIOLOGY & ADDITIONAL STUDIES:   CXR: b/l patchy infiltrates HPI:  Patient is a 56 yo F with a PMH of NIDDM (on Metformin 500mg qd) who presents to ED with SOB and chest pain x 3 days. Patient reports that she has been experiencing shortness of breath, progressively worsening with exertion, associated with midsternal chest pain, non-radiating, worse with inspiration. She denies any known sick contacts or recent travels.     Upon ED arrival, T 99.7 (oral) - 100.3 (rectal), HR 70 - 97, -104/53-60, RR 18-24, SpO2: 81% on RA -> % on 15L NRBM. Labs s/f Na 140, K 3.5, Cl 101, CO2 25, BUN/Cr 5/0.61, Glu 180, D-Dimer 289, Ferritin 383, CRP 7.79,  Lactate 3.2, procal 0.34, Trop T <0.01. CXR with  b/l patchy infiltrates, EKG pending, NSR on monitor. She received Decadron 6mg IVP x 1, Tylenol 975mg and 500cc NS bolus x 1. Patient admitted to medicine, however attempt to wean pt to NC failed, patient tachypneic and SpO2 88% on 6L NC. ICU consulted for hypoxic respiratory failure and increased WOB    Subjective: patient seen and examined at bedside, sitting up in bed, in mild distress. Patient on NRBM, satting 100%, however tachypneic to 36-40. Reports sharp R sternal chest pain with inspiration, and non-productive cough. She reports poor appetite for the past 3 days, she has only been drinking. Denies chills, abdominal pain, nausea/vomiting, diarrhea, constipation.    PAST MEDICAL & SURGICAL HISTORY:  Diabetes   delivery  Cholecystectomy  R shoulder surgery    Home Medications:  metFORMIN 500 mg oral tablet, extended release: 1 tab(s) orally once a day (2021 12:15)    ALLERGIES:  No Known Drug Allergies  Seafood (Swelling; Rash)    Intolerances    FAMILY HISTORY:  No pertinent family history in first degree relatives of MI or CVA    Social History: denies cigarette smoking, EtOH use, illicit drug use    ROS: as per HPI    VITAL SIGNS:  Vital Signs Last 24 Hrs  T(C): 36.5 (2021 14:54), Max: 37.9 (2021 11:28)  T(F): 97.7 (2021 14:54), Max: 100.3 (2021 11:28)  HR: 72 (2021 14:54) (70 - 97)  BP: 100/62 (2021 14:54) (100/60 - 104/53)  BP(mean): --  RR: 18 (2021 14:54) (18 - 24)  SpO2: 99% (2021 14:54) (81% - 100%)    PHYSICAL EXAM:  General: sitting up in bed  HEENT: normocephalic, atraumatic; PERRL, anicteric sclera; dry MM  Neck: supple, no JVD, no thyromegaly, no lymphadenopathy  Cardiovascular: +S1/S2, RRR, no M/G/R  Respiratory: bibasilar crackles, increased WOB, with use of accessory muscles  Gastrointestinal: soft, NT/ND; +BSx4, no organomegaly; negative Farias's sign  Extremities: WWP; no edema, clubbing or cyanosis  Vascular: 2+ radial, DP/PT pulses B/L  Neurological: AAOx3; no focal deficits    LABS:                        14.7   6.54  )-----------( 212      ( 2021 12:03 )             45.3     02-24    140  |  101  |  5<L>  ----------------------------<  180<H>  3.5   |  25  |  0.61    Ca    8.3<L>      2021 12:03    TPro  9.1<H>  /  Alb  3.3  /  TBili  0.4  /  DBili  x   /  AST  127<H>  /  ALT  42  /  AlkPhos  166<H>  02-24    PT/INR - ( 2021 12:03 )   PT: 12.2 sec;   INR: 1.02          PTT - ( 2021 12:03 )  PTT:31.7 sec    RADIOLOGY & ADDITIONAL STUDIES:   CXR: b/l patchy infiltrates HPI:  Patient is a 56 yo F with a PMH of NIDDM (on Metformin 500mg qd) who presents to ED with SOB and chest pain x 3 days. Patient reports that she has been experiencing shortness of breath, progressively worsening with exertion, associated with R upper sternal chest pain, non-radiating, worse with inspiration. She denies any known sick contacts or recent travels.     Upon ED arrival, T 99.7 (oral) - 100.3 (rectal), HR 70 - 97, -104/53-60, RR 18-24, SpO2: 81% on RA -> % on 15L NRBM. Labs s/f Na 140, K 3.5, Cl 101, CO2 25, BUN/Cr 5/0.61, Glu 180, D-Dimer 289, Ferritin 383, CRP 7.79,  Lactate 3.2, procal 0.34, Trop T <0.01. CXR with  b/l patchy infiltrates, EKG pending, NSR on monitor. She received Decadron 6mg IVP x 1, Tylenol 975mg and 500cc NS bolus x 1. Patient admitted to medicine, however attempt to wean pt to NC failed, patient tachypneic and SpO2 88% on 6L NC. ICU consulted for hypoxic respiratory failure and increased WOB    Subjective: patient seen and examined at bedside, sitting up in bed, in mild distress. Patient on NRBM, satting 100%, however tachypneic to 36-40. Reports sharp R upper sternal chest pain with inspiration, and non-productive cough. She reports poor appetite for the past 3 days, she has only been drinking. Denies chills, abdominal pain, nausea/vomiting, diarrhea, constipation.    PAST MEDICAL & SURGICAL HISTORY:  Diabetes   delivery  Cholecystectomy  R shoulder surgery    Home Medications:  metFORMIN 500 mg oral tablet, extended release: 1 tab(s) orally once a day (2021 12:15)    ALLERGIES:  No Known Drug Allergies  Seafood (Swelling; Rash)    Intolerances    FAMILY HISTORY:  No pertinent family history in first degree relatives of MI or CVA    Social History: denies cigarette smoking, EtOH use, illicit drug use    ROS: as per HPI    VITAL SIGNS:  Vital Signs Last 24 Hrs  T(C): 36.5 (2021 14:54), Max: 37.9 (2021 11:28)  T(F): 97.7 (2021 14:54), Max: 100.3 (2021 11:28)  HR: 72 (2021 14:54) (70 - 97)  BP: 100/62 (2021 14:54) (100/60 - 104/53)  BP(mean): --  RR: 18 (2021 14:54) (18 - 24)  SpO2: 99% (2021 14:54) (81% - 100%)    PHYSICAL EXAM:  General: sitting up in bed  HEENT: normocephalic, atraumatic; PERRL, anicteric sclera; dry MM  Neck: supple, no JVD, no thyromegaly, no lymphadenopathy  Cardiovascular: +S1/S2, RRR, no M/G/R  Respiratory: bibasilar crackles, increased WOB, with use of accessory muscles  Gastrointestinal: soft, NT/ND; +BSx4, no organomegaly; negative Farias's sign  Extremities: WWP; no edema, clubbing or cyanosis  Vascular: 2+ radial, DP/PT pulses B/L  Neurological: AAOx3; no focal deficits    LABS:                        14.7   6.54  )-----------( 212      ( 2021 12:03 )             45.3     02-24    140  |  101  |  5<L>  ----------------------------<  180<H>  3.5   |  25  |  0.61    Ca    8.3<L>      2021 12:03    TPro  9.1<H>  /  Alb  3.3  /  TBili  0.4  /  DBili  x   /  AST  127<H>  /  ALT  42  /  AlkPhos  166<H>  02-24    PT/INR - ( 2021 12:03 )   PT: 12.2 sec;   INR: 1.02          PTT - ( 2021 12:03 )  PTT:31.7 sec    RADIOLOGY & ADDITIONAL STUDIES:   CXR: b/l patchy infiltrates

## 2021-02-24 NOTE — H&P ADULT - PROBLEM SELECTOR PLAN 4
Vit b inj sent to pharmacy for low levels.  
Alk phos 166, AST//42. Patient without any abdominal pain or GI complaints  - f/u GGT  - f/u hepatitis panel  - monitor CMP  - holding off on RUQ sono for now, unless uptrending LFTs

## 2021-02-24 NOTE — H&P ADULT - PROBLEM SELECTOR PLAN 1
in the setting of COVID PNA. Patient hypoxic on ED arrival, SpO2 81% on RA -->% on 15L NRBM. Patient with increased WOB on NRBM, RR 36-40  - VBG : 7.40/43/29/26 51% sat  - transition pt to HFNC for increased WOB  - goal SpO2 > 94%

## 2021-02-24 NOTE — ED ADULT TRIAGE NOTE - CHIEF COMPLAINT QUOTE
Pt reports cough, chest pain, sob, fevers for one week. Last tylenol given at 4:30am. No known exposure to covid.

## 2021-02-24 NOTE — CONSULT NOTE ADULT - ATTENDING COMMENTS
This is a 55-year-old lady with a history of diabetes mellitus who was admitted with 3 days of shortness of breath and right-sided chest pain.  The patient required supplemental oxygen with high flow nasal cannula at the rate of 45 L/min and 60% FiO2.  The patient had low-grade temperatures to 100.3 °F and tested positive for Covid.  She was given Decadron and remdesivir.  Her liver functions were elevated.  I reviewed her chest x-ray.  It shows bilateral peripheral and nodular opacities.  Her rocs index is 8.6.  We plan to get D-dimers, leg Dopplers and follow-up markers of inflammation.  Currently her CRP 7.79, her ferritin is 363 and a D-dimers are normal.

## 2021-02-24 NOTE — ED PROVIDER NOTE - OBJECTIVE STATEMENT
54 y/o F PMHx DM on Metformin p/w SOB and CP since Wednesday. Pt noted to be febrile here in ED today. Denies other complaints, abdominal pain, vomiting, diarrhea, leg swelling, headache, dizziness, lightheadedness, AC use.

## 2021-02-24 NOTE — ED ADULT NURSE NOTE - OBJECTIVE STATEMENT
55Y Female A&OX3 c/o fever, SOB, chills, Night sweats, dry cough, Chest pain "like a 10 out of 10 when I cough but not there when I'm at rest, and change in taste and smell for "about 3 weeks". pt's o2 sat 81 on room air. Pt upgraded to MD Thomas and placed on a nonrebreather mask. 02 sat 98 percent on nonrebreather. Pt denies known exposure to covid 19 nor anyone else at home sick at this time. Per pt "My apartment did not have heat when it was really cold and snowed a few weeks ago and I felt sick ever since. The heat is on right now". Denies n/v, dizziness. Tylenol taken at 3:45 this morning.

## 2021-02-24 NOTE — H&P ADULT - PROBLEM SELECTOR PLAN 5
Pt reports R upper sternal chest pain with inspiration, and when coughing. Pain is non-radiating, not associated with nausea or diaphoresis  - EKG pending, however pt on monitor, no ischemic changes  - Trop T < 0.01  - continue to monitor

## 2021-02-24 NOTE — CONSULT NOTE ADULT - ASSESSMENT
#Hypoxic respiratory failure  in the setting of COVID PNA. Patient hypoxic on ED arrival, SpO2 81% on RA -->% on 15L NRBM. Patient with increased WOB on NRBM, RR 36-40  - VBG : 7.40/43/29/26 51% sat  - transition pt to HFNC for increased WOB  - goal SpO2 > 94%  - management as below    #COVID  Pt presents with fever, cough, shortness of breath for ___ days. Imaging showing bilateral ground glass opacities highly suspicious for COVID-19.   - Lymphopenic, ferritin ___, CRP ___, procal ___, d-dimer_______, AST/ALT ___  - f/u COVID-19 PCR  - Dexamethasone 6mg PO x 10 days  - Remdesivir taper x 5 days  - Continue to trend daily LFTs, ferritin, CRP, d-dimer  - Tylenol PRN for fever  - Supplemental O2, wean as tolerated    Dispo: COVID tele     #Hypoxic respiratory failure  in the setting of COVID PNA. Patient hypoxic on ED arrival, SpO2 81% on RA -->% on 15L NRBM. Patient with increased WOB on NRBM, RR 36-40  - VBG : 7.40/43/29/26 51% sat  - transition pt to HFNC for increased WOB  - goal SpO2 > 94%  - management as below    #COVID  Pt presents with fever, cough, shortness of breath for ___ days. Imaging showing bilateral ground glass opacities highly suspicious for COVID-19.   - Lymphopenic, ferritin ___, CRP ___, procal ___, d-dimer_______, AST/ALT ___  - f/u COVID-19 PCR  - Dexamethasone 6mg PO x 10 days  - Remdesivir taper x 5 days  - Continue to trend daily LFTs, ferritin, CRP, d-dimer  - Tylenol PRN for fever  - Supplemental O2, wean as tolerated    #Protein gap  Pt with gap 5.8. Likely in the setting of active viral infection w/ COVID.   - f/u HIV  - f/u viral hepatitis panel  - holding off on SPEP, UPEP, Immunofixation given low suspicion for gammopathy    Dispo: COVID tele 56 yo F with a PMH of NIDDM (on Metformin 500mg qd) who presents to ED with SOB and chest pain x 3 days.  Patient hypoxic to 81% on RA, requiring NRBM to maintain SpO2 > 94%, CXR with b/l infiltrates c/w COVID PNA    #Hypoxic respiratory failure  in the setting of COVID PNA. Patient hypoxic on ED arrival, SpO2 81% on RA -->% on 15L NRBM. Patient with increased WOB on NRBM, RR 36-40  - VBG : 7.40/43/29/26 51% sat  - transition pt to HFNC for increased WOB  - goal SpO2 > 94%  - management as below    #COVID  Pt presents with dry cough, shortness of breath for 3 days. Hypoxic to 81% on RA. Imaging showing bilateral ground glass opacities highly suspicious for COVID-19.   - Lymphopenic, ferritin 363, CRP 7.79, procal 0.34, d-dimer 289, AST//42  - f/u COVID-19 PCR  - Dexamethasone 6mg PO x 10 days  - Remdesivir taper x 5 days, monitor LFTs (d/c if LFTs uptrending)  - Continue to trend daily LFTs, ferritin, CRP, d-dimer  - Tylenol PRN for fever  - Supplemental O2, wean as tolerated    #SIRS  Patient meeting 2/4 SIRS criteria (HR 97, RR 24) on ED arrival, likely in the setting of COVID PNA  - f/u BCx  - f/u UA  - f/u RVP  - holding off on antibiotics given source is likely covid    #Protein gap  Pt with gap 5.8. Likely in the setting of active viral infection w/ COVID  - f/u HIV  - f/u viral hepatitis panel  - holding off on SPEP, UPEP, Immunofixation given low suspicion for gammopathy    #Transaminitis  Alk phos 166, AST//42. Patient without any abdominal pain or GI complaints  - f/u GGT  - f/u hepatitis panel  - monitor CMP  - holding off on RUQ sono for now, unless uptrending LFTs    #DM  Hx of DM, on home Metformin 500mg qd for the last 3 years  - f/u A1c  - mISS  - monitor FSG  - consistent carbs diet    Dispo: COVID tele    Discussed with attending 56 yo F with a PMH of NIDDM (on Metformin 500mg qd) who presents to ED with SOB and chest pain x 3 days.  Patient hypoxic to 81% on RA, requiring NRBM to maintain SpO2 > 94%, CXR with b/l infiltrates c/w COVID PNA    #Hypoxic respiratory failure  in the setting of COVID PNA. Patient hypoxic on ED arrival, SpO2 81% on RA -->% on 15L NRBM. Patient with increased WOB on NRBM, RR 36-40  - VBG : 7.40/43/29/26 51% sat  - transition pt to HFNC for increased WOB  - goal SpO2 > 94%  - management as below    #COVID  Pt presents with dry cough, shortness of breath for 3 days. Hypoxic to 81% on RA. Imaging showing bilateral ground glass opacities highly suspicious for COVID-19.   - Lymphopenic, ferritin 363, CRP 7.79, procal 0.34, d-dimer 289, AST//42  - f/u COVID-19 PCR  - Dexamethasone 6mg PO x 10 days  - Remdesivir taper x 5 days, monitor LFTs (d/c if LFTs uptrending)  - Continue to trend daily LFTs, ferritin, CRP, d-dimer  - Tylenol PRN for fever  - Supplemental O2, wean as tolerated    #SIRS  Patient meeting 2/4 SIRS criteria (HR 97, RR 24) on ED arrival, likely in the setting of COVID PNA  - f/u BCx  - f/u UA  - f/u RVP  - holding off on antibiotics given source is likely covid    #Protein gap  Pt with gap 5.8. Likely in the setting of active viral infection w/ COVID  - f/u HIV  - f/u viral hepatitis panel  - holding off on SPEP, UPEP, Immunofixation given low suspicion for gammopathy    #Transaminitis  Alk phos 166, AST//42. Patient without any abdominal pain or GI complaints  - f/u GGT  - f/u hepatitis panel  - monitor CMP  - holding off on RUQ sono for now, unless uptrending LFTs    #DM  Hx of DM, on home Metformin 500mg qd for the last 3 years  - f/u A1c  - mISS  - monitor FSG  - consistent carbs diet    #Chest pain  Pt reports R upper sternal chest pain with inspiration, and when coughing. Pain is non-radiating, not associated with nausea or diaphoresis  - EKG pending, however pt on monitor, no ischemic changes  - Trop T < 0.01  - continue to monitor    Dispo: COVID tele    Discussed with attending

## 2021-02-24 NOTE — H&P ADULT - PROBLEM SELECTOR PLAN 3
Hx of DM, on home Metformin 500mg qd for the last 3 years  - f/u A1c  - mISS  - monitor FSG  - consistent carbs diet

## 2021-02-24 NOTE — H&P ADULT - NSHPPHYSICALEXAM_GEN_ALL_CORE
.  VITAL SIGNS:  T(C): 36.5 (02-24-21 @ 14:54), Max: 37.9 (02-24-21 @ 11:28)  T(F): 97.7 (02-24-21 @ 14:54), Max: 100.3 (02-24-21 @ 11:28)  HR: 72 (02-24-21 @ 14:54) (70 - 97)  BP: 100/62 (02-24-21 @ 14:54) (100/60 - 104/53)  BP(mean): --  RR: 18 (02-24-21 @ 14:54) (18 - 24)  SpO2: 99% (02-24-21 @ 14:54) (81% - 100%)  Wt(kg): --    PHYSICAL EXAM:    Constitutional: WDWN resting comfortably in bed; NAD  Head: NC/AT  Eyes: PERRL, EOMI, anicteric sclera  ENT: no nasal discharge; uvula midline, no oropharyngeal erythema or exudates; MMM  Neck: supple; no JVD or thyromegaly  Respiratory: crackles Lower lung fields no W/R/R, no retractions, pleuritic chest pain  Cardiac: +S1/S2; RRR; no M/R/G; PMI non-displaced, pain to palpation xiphoid process/epigastric region  Gastrointestinal: soft, NT/ND; no rebound or guarding; +BSx4  Back: spine midline, no bony tenderness or step-offs; no CVAT B/L  Extremities: WWP, no clubbing or cyanosis; no peripheral edema. Capillary refill <2 sec  Musculoskeletal: NROM x4; no joint swelling, tenderness or erythema  Vascular: 2+ radial, femoral, DP/PT pulses B/L  Dermatologic: skin warm, dry and intact; no rashes, wounds, or scars  Lymphatic: no submandibular or cervical LAD  Neurologic: AAOx3; CNII-XII grossly intact; no focal deficits  Psychiatric: affect and characteristics of appearance, verbalizations, behaviors are appropriate

## 2021-02-24 NOTE — H&P ADULT - HISTORY OF PRESENT ILLNESS
In the ED:  Initial vital signs: T: 100 F, HR: 97, BP: 103/70, R: 24, SpO2: 81% on RA -> RR:22 Spo2 98% on NRB  Labs: significant for D-Dimer 289, Ferritin 383, CRP 7.79,  Lactate 3.2, procal 0.34  Imaging:   CXR: b/l infiltrates  EKG:   Medications: 6mg IVP decadron, tyleonol 975mg, 500cc NS bolus  Consults: none  55 F PMHx NIDDM,  cholecystectomy 2018 at [facility] presents to ED for SOB. Pts symptoms began 1 week ago when she began to experience cough, SOB    Of note pt SBP is baseline 100s per pt and chart review from previous admission     In the ED:  Initial vital signs: T: 100 F, HR: 97, BP: 103/70, R: 24, SpO2: 81% on RA -> RR:22 Spo2 98% on NRB  Labs: significant for D-Dimer 289, Ferritin 383, CRP 7.79,  Lactate 3.2, procal 0.34, trops neg  Imaging:   CXR: b/l infiltrates  EKG:   Medications: 6mg IVP decadron, tyleonol 975mg, 500cc NS bolus  Consults: none  Patient is a 56 yo F with a PMH of NIDDM (on Metformin 500mg qd) who presents to ED with SOB and chest pain x 3 days. Patient reports that she has been experiencing shortness of breath, pleuritic chest pain and progressively worsening with exertion, associated with R upper sternal chest pain, non-radiating, worse with inspiration. She denies any known sick contacts or recent travels.     Of note pt Systolic BP is baseline 100s per pt and chart review from previous admission     In the ED:  Initial vital signs: T: 100 F, HR: 97, BP: 103/70, R: 24, SpO2: 81% on RA -> RR:22 Spo2 98% on NRB  Labs: significant for D-Dimer 289, Ferritin 383, CRP 7.79,  Lactate 3.2, procal 0.34, trops neg  Imaging:   CXR: b/l infiltrates  EKG:   Medications: 6mg IVP decadron, tyleonol 975mg, 500cc NS bolus  Consults: none

## 2021-02-24 NOTE — H&P ADULT - ASSESSMENT
55 F PMHx NIDDM,  cholecystectomy 2018 at [facility] presents to ED for SOB. 55 F PMHx NIDDM, cholecystectomy 2018 at [facility] presents to ED for SOB. found to be COVID postive on HFNC COVID tele

## 2021-02-24 NOTE — H&P ADULT - NSHPLABSRESULTS_GEN_ALL_CORE
LABS:                         14.7   6.54  )-----------( 212      ( 24 Feb 2021 12:03 )             45.3     02-24    140  |  101  |  5<L>  ----------------------------<  180<H>  3.5   |  25  |  0.61    Ca    8.3<L>      24 Feb 2021 12:03    TPro  9.1<H>  /  Alb  3.3  /  TBili  0.4  /  DBili  x   /  AST  127<H>  /  ALT  42  /  AlkPhos  166<H>  02-24    PT/INR - ( 24 Feb 2021 12:03 )   PT: 12.2 sec;   INR: 1.02          PTT - ( 24 Feb 2021 12:03 )  PTT:31.7 sec    CARDIAC MARKERS ( 24 Feb 2021 12:03 )  x     / <0.01 ng/mL / x     / x     / x          Serum Pro-Brain Natriuretic Peptide: 68 pg/mL (02-24 @ 12:03)    Lactate, Blood: 3.2 mmol/L (02-24 @ 12:04)      RADIOLOGY, EKG & ADDITIONAL TESTS:

## 2021-02-24 NOTE — H&P ADULT - NSHPREVIEWOFSYSTEMS_GEN_ALL_CORE
REVIEW OF SYSTEMS:    EYES/ENT: Patient denies visual changes;  denies vertigo or throat pain   NECK: Patient denies pain or stiffness  RESPIRATORY: Patient denies wheezing, hemoptysis  CARDIOVASCULAR: Patient denies chest pain or palpitations  GASTROINTESTINAL: Patient denies nausea, vomiting, or hematemesis, diarrhea or constipation, melena or hematochezia.  GENITOURINARY: Patient denies frequency or hematuria  NEUROLOGICAL: Patient denies numbness or weakness  SKIN: Patient denies itching, burning, rashes, or lesions   All other review of systems is negative unless indicated above.

## 2021-02-24 NOTE — ED PROVIDER NOTE - CLINICAL SUMMARY MEDICAL DECISION MAKING FREE TEXT BOX
Pt w/ high suspicion for COVID19. Satting 81% on arrival based on nonrebreather. No tachypnea noted. Given dexamethazone and will admit. X-ray showing diffuse bilateral patchy infiltrates. Pt w/ high suspicion for COVID19. Sats 81% on arrival placed on nonrebreather. NAD, speaking in full sentences. Given dexamethazone and will admit.

## 2021-02-25 LAB
A1C WITH ESTIMATED AVERAGE GLUCOSE RESULT: 11.3 % — HIGH (ref 4–5.6)
ALBUMIN SERPL ELPH-MCNC: 2.5 G/DL — LOW (ref 3.3–5)
ALP SERPL-CCNC: 143 U/L — HIGH (ref 40–120)
ALT FLD-CCNC: 32 U/L — SIGNIFICANT CHANGE UP (ref 10–45)
ANION GAP SERPL CALC-SCNC: 10 MMOL/L — SIGNIFICANT CHANGE UP (ref 5–17)
AST SERPL-CCNC: 89 U/L — HIGH (ref 10–40)
BASOPHILS # BLD AUTO: 0.05 K/UL — SIGNIFICANT CHANGE UP (ref 0–0.2)
BASOPHILS NFR BLD AUTO: 0.9 % — SIGNIFICANT CHANGE UP (ref 0–2)
BILIRUB SERPL-MCNC: 0.3 MG/DL — SIGNIFICANT CHANGE UP (ref 0.2–1.2)
BUN SERPL-MCNC: 9 MG/DL — SIGNIFICANT CHANGE UP (ref 7–23)
CALCIUM SERPL-MCNC: 8.6 MG/DL — SIGNIFICANT CHANGE UP (ref 8.4–10.5)
CHLORIDE SERPL-SCNC: 110 MMOL/L — HIGH (ref 96–108)
CO2 SERPL-SCNC: 24 MMOL/L — SIGNIFICANT CHANGE UP (ref 22–31)
CREAT SERPL-MCNC: 0.48 MG/DL — LOW (ref 0.5–1.3)
CRP SERPL-MCNC: 7.23 MG/DL — HIGH (ref 0–0.4)
D DIMER BLD IA.RAPID-MCNC: 437 NG/ML DDU — HIGH
EOSINOPHIL # BLD AUTO: 0 K/UL — SIGNIFICANT CHANGE UP (ref 0–0.5)
EOSINOPHIL NFR BLD AUTO: 0 % — SIGNIFICANT CHANGE UP (ref 0–6)
ESTIMATED AVERAGE GLUCOSE: 278 MG/DL — HIGH (ref 68–114)
FERRITIN SERPL-MCNC: 265 NG/ML — HIGH (ref 15–150)
GLUCOSE BLDC GLUCOMTR-MCNC: 152 MG/DL — HIGH (ref 70–99)
GLUCOSE BLDC GLUCOMTR-MCNC: 209 MG/DL — HIGH (ref 70–99)
GLUCOSE BLDC GLUCOMTR-MCNC: 220 MG/DL — HIGH (ref 70–99)
GLUCOSE BLDC GLUCOMTR-MCNC: 239 MG/DL — HIGH (ref 70–99)
GLUCOSE SERPL-MCNC: 169 MG/DL — HIGH (ref 70–99)
HCT VFR BLD CALC: 39.6 % — SIGNIFICANT CHANGE UP (ref 34.5–45)
HGB BLD-MCNC: 12.8 G/DL — SIGNIFICANT CHANGE UP (ref 11.5–15.5)
LYMPHOCYTES # BLD AUTO: 0.8 K/UL — LOW (ref 1–3.3)
LYMPHOCYTES # BLD AUTO: 14.4 % — SIGNIFICANT CHANGE UP (ref 13–44)
MAGNESIUM SERPL-MCNC: 2.4 MG/DL — SIGNIFICANT CHANGE UP (ref 1.6–2.6)
MANUAL SMEAR VERIFICATION: SIGNIFICANT CHANGE UP
MCHC RBC-ENTMCNC: 28.8 PG — SIGNIFICANT CHANGE UP (ref 27–34)
MCHC RBC-ENTMCNC: 32.3 GM/DL — SIGNIFICANT CHANGE UP (ref 32–36)
MCV RBC AUTO: 89 FL — SIGNIFICANT CHANGE UP (ref 80–100)
MONOCYTES # BLD AUTO: 0.75 K/UL — SIGNIFICANT CHANGE UP (ref 0–0.9)
MONOCYTES NFR BLD AUTO: 13.5 % — SIGNIFICANT CHANGE UP (ref 2–14)
NEUTROPHILS # BLD AUTO: 3.97 K/UL — SIGNIFICANT CHANGE UP (ref 1.8–7.4)
NEUTROPHILS NFR BLD AUTO: 71.2 % — SIGNIFICANT CHANGE UP (ref 43–77)
PLAT MORPH BLD: ABNORMAL
PLATELET # BLD AUTO: 220 K/UL — SIGNIFICANT CHANGE UP (ref 150–400)
POTASSIUM SERPL-MCNC: 3.8 MMOL/L — SIGNIFICANT CHANGE UP (ref 3.5–5.3)
POTASSIUM SERPL-SCNC: 3.8 MMOL/L — SIGNIFICANT CHANGE UP (ref 3.5–5.3)
PROT SERPL-MCNC: 7.6 G/DL — SIGNIFICANT CHANGE UP (ref 6–8.3)
RBC # BLD: 4.45 M/UL — SIGNIFICANT CHANGE UP (ref 3.8–5.2)
RBC # FLD: 14.7 % — HIGH (ref 10.3–14.5)
RBC BLD AUTO: ABNORMAL
SMUDGE CELLS # BLD: PRESENT — SIGNIFICANT CHANGE UP
SODIUM SERPL-SCNC: 144 MMOL/L — SIGNIFICANT CHANGE UP (ref 135–145)
TARGETS BLD QL SMEAR: SLIGHT — SIGNIFICANT CHANGE UP
WBC # BLD: 5.58 K/UL — SIGNIFICANT CHANGE UP (ref 3.8–10.5)
WBC # FLD AUTO: 5.58 K/UL — SIGNIFICANT CHANGE UP (ref 3.8–10.5)

## 2021-02-25 PROCEDURE — 99233 SBSQ HOSP IP/OBS HIGH 50: CPT | Mod: GC

## 2021-02-25 PROCEDURE — 71045 X-RAY EXAM CHEST 1 VIEW: CPT | Mod: 26

## 2021-02-25 RX ORDER — SODIUM CHLORIDE 0.65 %
1 AEROSOL, SPRAY (ML) NASAL THREE TIMES A DAY
Refills: 0 | Status: DISCONTINUED | OUTPATIENT
Start: 2021-02-25 | End: 2021-03-06

## 2021-02-25 RX ORDER — POTASSIUM CHLORIDE 20 MEQ
20 PACKET (EA) ORAL ONCE
Refills: 0 | Status: COMPLETED | OUTPATIENT
Start: 2021-02-25 | End: 2021-02-25

## 2021-02-25 RX ADMIN — Medication 20 MILLIEQUIVALENT(S): at 10:09

## 2021-02-25 RX ADMIN — Medication 6 MILLIGRAM(S): at 14:05

## 2021-02-25 RX ADMIN — Medication 4: at 21:59

## 2021-02-25 RX ADMIN — REMDESIVIR 500 MILLIGRAM(S): 5 INJECTION INTRAVENOUS at 17:05

## 2021-02-25 RX ADMIN — Medication 4: at 17:04

## 2021-02-25 RX ADMIN — Medication 4: at 13:45

## 2021-02-25 RX ADMIN — ENOXAPARIN SODIUM 40 MILLIGRAM(S): 100 INJECTION SUBCUTANEOUS at 17:04

## 2021-02-25 RX ADMIN — Medication 2: at 07:15

## 2021-02-25 NOTE — PROGRESS NOTE ADULT - PROBLEM SELECTOR PLAN 3
Hx of DM, on home Metformin 500mg qd for the last 3 years  - A1C 11.   - mISS  - monitor FSG  - consistent carbs diet  - will calculate 24 hour need and dose to basal bolus dosing.

## 2021-02-25 NOTE — PROGRESS NOTE ADULT - PROBLEM SELECTOR PLAN 1
In the setting of COVID PNA. Patient hypoxic on ED arrival, SpO2 81% on RA. VBG : 7.40/43/29/26 51% sat. Transition pt to HFNC for increased WOB  - goal SpO2 > 90%  - set at HFNC 50/60% this morning as pt was tachypneic with iWOB on previous setting 40/60%.   - elevate head of bed as increased WOB while laying down.  - incentive spirometry  - obtain dopplers of LE

## 2021-02-25 NOTE — PROGRESS NOTE ADULT - SUBJECTIVE AND OBJECTIVE BOX
HOA GILMORE, 55y, Female  MRN-[id number]  Patient is a 55y old  Female who presents with a chief complaint of SOB (24 Feb 2021 15:05)      OVERNIGHT EVENTS:     SUBJECTIVE:  -------------------------------------------------------------------------------  VITAL SIGNS:  Vital Signs Last 24 Hrs  T(C): 36.8 (25 Feb 2021 04:53), Max: 37.9 (24 Feb 2021 11:28)  T(F): 98.3 (25 Feb 2021 04:53), Max: 100.3 (24 Feb 2021 11:28)  HR: 53 (25 Feb 2021 04:00) (52 - 97)  BP: 102/61 (25 Feb 2021 04:00) (100/60 - 113/98)  BP(mean): 71 (25 Feb 2021 04:00) (71 - 101)  RR: 27 (25 Feb 2021 04:00) (18 - 27)  SpO2: 95% (25 Feb 2021 04:00) (81% - 100%)      I&O's Summary    24 Feb 2021 07:01  -  25 Feb 2021 07:00  --------------------------------------------------------  IN: 0 mL / OUT: 500 mL / NET: -500 mL        PHYSICAL EXAM:    General: NAD, well developed  HEENT: NC/AT; EOMI, PERRLA, anicteric sclera; moist mucosal membranes.  Neck: supple, trachea midline  Cardiovascular: RRR, +S1/S2; NO M/R/G  Respiratory: CTA B/L; no W/R/R  Gastrointestinal: soft, NT/ND; +BSx4  Extremities: WWP; no edema or cyanosis  Vascular: 2+ radial, DP/PT pulses B/L  Neurological: AAOx3; no focal deficits    ALLERGIES:  Allergies    No Known Drug Allergies  Seafood (Swelling; Rash)    Intolerances        MEDICATIONS:  MEDICATIONS  (STANDING):  dexAMETHasone  Injectable 6 milliGRAM(s) IV Push every 24 hours  dextrose 40% Gel 15 Gram(s) Oral once  dextrose 5%. 1000 milliLiter(s) (50 mL/Hr) IV Continuous <Continuous>  dextrose 5%. 1000 milliLiter(s) (100 mL/Hr) IV Continuous <Continuous>  dextrose 50% Injectable 25 Gram(s) IV Push once  dextrose 50% Injectable 12.5 Gram(s) IV Push once  dextrose 50% Injectable 25 Gram(s) IV Push once  enoxaparin Injectable 40 milliGRAM(s) SubCutaneous every 24 hours  glucagon  Injectable 1 milliGRAM(s) IntraMuscular once  insulin lispro (ADMELOG) corrective regimen sliding scale   SubCutaneous Before meals and at bedtime  remdesivir  IVPB   IV Intermittent   remdesivir  IVPB 100 milliGRAM(s) IV Intermittent every 24 hours    MEDICATIONS  (PRN):      -------------------------------------------------------------------------------  LABS:                        14.7   6.54  )-----------( 212      ( 24 Feb 2021 12:03 )             45.3     02-24    140  |  101  |  5<L>  ----------------------------<  180<H>  3.5   |  25  |  0.61    Ca    8.3<L>      24 Feb 2021 12:03  Phos  3.5     02-24  Mg     2.1     02-24    TPro  9.1<H>  /  Alb  3.3  /  TBili  0.4  /  DBili  x   /  AST  127<H>  /  ALT  42  /  AlkPhos  166<H>  02-24    LIVER FUNCTIONS - ( 24 Feb 2021 12:03 )  Alb: 3.3 g/dL / Pro: 9.1 g/dL / ALK PHOS: 166 U/L / ALT: 42 U/L / AST: 127 U/L / GGT: 392 U/L       PT/INR - ( 24 Feb 2021 12:03 )   PT: 12.2 sec;   INR: 1.02          PTT - ( 24 Feb 2021 12:03 )  PTT:31.7 sec    Lactate, Blood: 1.5 mmol/L (02-24 @ 15:49)  Lactate, Blood: 3.2 mmol/L (02-24 @ 12:04)    CARDIAC MARKERS ( 24 Feb 2021 12:03 )  x     / <0.01 ng/mL / x     / x     / x            CAPILLARY BLOOD GLUCOSE      POCT Blood Glucose.: 152 mg/dL (25 Feb 2021 06:51)  POCT Blood Glucose.: 191 mg/dL (24 Feb 2021 21:27)  POCT Blood Glucose.: 165 mg/dL (24 Feb 2021 16:35)        Culture - Blood (collected 24 Feb 2021 12:31)  Source: .Blood Blood-Peripheral  Preliminary Report (25 Feb 2021 01:01):    No growth at 12 hours    Culture - Blood (collected 24 Feb 2021 12:31)  Source: .Blood Blood-Peripheral  Preliminary Report (25 Feb 2021 01:01):    No growth at 12 hours        RADIOLOGY & ADDITIONAL TESTS: Reviewed.   ANICETOHOA CHURCHILL, 55y, Female  MRN-[id number]  Patient is a 55y old  Female who presents with a chief complaint of SOB (24 Feb 2021 15:05)      OVERNIGHT EVENTS: MEGGAN.    SUBJECTIVE: Pt seen and examined at bedside this AM. Initially was 40/63% and pt stated that she was feeling tachypneic with mild iWOB. Increased setting to 50/63% on HFNC with improvement.   -------------------------------------------------------------------------------  VITAL SIGNS:  Vital Signs Last 24 Hrs  T(C): 36.8 (25 Feb 2021 04:53), Max: 37.9 (24 Feb 2021 11:28)  T(F): 98.3 (25 Feb 2021 04:53), Max: 100.3 (24 Feb 2021 11:28)  HR: 53 (25 Feb 2021 04:00) (52 - 97)  BP: 102/61 (25 Feb 2021 04:00) (100/60 - 113/98)  BP(mean): 71 (25 Feb 2021 04:00) (71 - 101)  RR: 27 (25 Feb 2021 04:00) (18 - 27)  SpO2: 95% (25 Feb 2021 04:00) (81% - 100%)      I&O's Summary    24 Feb 2021 07:01  -  25 Feb 2021 07:00  --------------------------------------------------------  IN: 0 mL / OUT: 500 mL / NET: -500 mL        PHYSICAL EXAM:    General: mild distress, well developed.  HEENT: NC/AT; anicteric sclera; moist mucosal membranes.  Neck: supple, trachea midline  Cardiovascular: RRR, +S1/S2; NO M/R/G  Respiratory: Bibasilar crackles L>R. Tachypneic breathing to 35-38.  Gastrointestinal: soft, NT/ND; +BSx4  Extremities: WWP; no edema or cyanosis  Vascular: 2+ radial, DP/PT pulses B/L  Neurological: AAOx3; no focal deficits    ALLERGIES:  Allergies    No Known Drug Allergies  Seafood (Swelling; Rash)    Intolerances        MEDICATIONS:  MEDICATIONS  (STANDING):  dexAMETHasone  Injectable 6 milliGRAM(s) IV Push every 24 hours  dextrose 40% Gel 15 Gram(s) Oral once  dextrose 5%. 1000 milliLiter(s) (50 mL/Hr) IV Continuous <Continuous>  dextrose 5%. 1000 milliLiter(s) (100 mL/Hr) IV Continuous <Continuous>  dextrose 50% Injectable 25 Gram(s) IV Push once  dextrose 50% Injectable 12.5 Gram(s) IV Push once  dextrose 50% Injectable 25 Gram(s) IV Push once  enoxaparin Injectable 40 milliGRAM(s) SubCutaneous every 24 hours  glucagon  Injectable 1 milliGRAM(s) IntraMuscular once  insulin lispro (ADMELOG) corrective regimen sliding scale   SubCutaneous Before meals and at bedtime  remdesivir  IVPB   IV Intermittent   remdesivir  IVPB 100 milliGRAM(s) IV Intermittent every 24 hours    MEDICATIONS  (PRN):      -------------------------------------------------------------------------------  LABS:                        14.7   6.54  )-----------( 212      ( 24 Feb 2021 12:03 )             45.3     02-24    140  |  101  |  5<L>  ----------------------------<  180<H>  3.5   |  25  |  0.61    Ca    8.3<L>      24 Feb 2021 12:03  Phos  3.5     02-24  Mg     2.1     02-24    TPro  9.1<H>  /  Alb  3.3  /  TBili  0.4  /  DBili  x   /  AST  127<H>  /  ALT  42  /  AlkPhos  166<H>  02-24    LIVER FUNCTIONS - ( 24 Feb 2021 12:03 )  Alb: 3.3 g/dL / Pro: 9.1 g/dL / ALK PHOS: 166 U/L / ALT: 42 U/L / AST: 127 U/L / GGT: 392 U/L       PT/INR - ( 24 Feb 2021 12:03 )   PT: 12.2 sec;   INR: 1.02          PTT - ( 24 Feb 2021 12:03 )  PTT:31.7 sec    Lactate, Blood: 1.5 mmol/L (02-24 @ 15:49)  Lactate, Blood: 3.2 mmol/L (02-24 @ 12:04)    CARDIAC MARKERS ( 24 Feb 2021 12:03 )  x     / <0.01 ng/mL / x     / x     / x            CAPILLARY BLOOD GLUCOSE      POCT Blood Glucose.: 152 mg/dL (25 Feb 2021 06:51)  POCT Blood Glucose.: 191 mg/dL (24 Feb 2021 21:27)  POCT Blood Glucose.: 165 mg/dL (24 Feb 2021 16:35)        Culture - Blood (collected 24 Feb 2021 12:31)  Source: .Blood Blood-Peripheral  Preliminary Report (25 Feb 2021 01:01):    No growth at 12 hours    Culture - Blood (collected 24 Feb 2021 12:31)  Source: .Blood Blood-Peripheral  Preliminary Report (25 Feb 2021 01:01):    No growth at 12 hours        RADIOLOGY & ADDITIONAL TESTS: Reviewed.

## 2021-02-25 NOTE — PROGRESS NOTE ADULT - PROBLEM SELECTOR PLAN 2
Pt presents with dry cough, shortness of breath for 3 days. Hypoxic to 81% on RA. Imaging showing bilateral ground glass opacities highly suspicious for COVID-19.  - Dexamethasone 6mg PO x 10 days (2/24-3/5)  - Remdesivir taper x 5 days (2/24-2/28) if LFTs continue to improve.  - Continue to trend daily LFTs, ferritin, CRP, d-dimer  - Tylenol PRN for fever  - Supplemental O2, wean as tolerated    #Protein gap  Pt with gap 5.8. Likely in the setting of active viral infection w/ COVID  - HIV negative  - Hepatitis panel negative.  - holding off on SPEP, UPEP, Immunofixation given low suspicion for gammopathy

## 2021-02-25 NOTE — PROGRESS NOTE ADULT - PROBLEM SELECTOR PLAN 5
Pt reports R upper sternal chest pain with inspiration, and when coughing. Pain is non-radiating, not associated with nausea or diaphoresis  - no longer complaining of pain this morning.  - Trop T < 0.01  - continue to monitor

## 2021-02-25 NOTE — PROGRESS NOTE ADULT - PROBLEM SELECTOR PLAN 4
Alk phos 166, AST//42. on admission. Patient without any abdominal pain or GI complaints  - monitor CMP  - LFTs appear to be improving today.  - holding off on RUQ sono for now

## 2021-02-25 NOTE — PROGRESS NOTE ADULT - ASSESSMENT
55 F PMHx NIDDM, cholecystectomy 2018 at [facility] presents to ED for SOB, found to be COVID postive with increased WOB and sent to [facility] tele for increased monitoring and O2 requirements.  Home

## 2021-02-26 LAB
ALBUMIN SERPL ELPH-MCNC: 2.6 G/DL — LOW (ref 3.3–5)
ALP SERPL-CCNC: 136 U/L — HIGH (ref 40–120)
ALT FLD-CCNC: 28 U/L — SIGNIFICANT CHANGE UP (ref 10–45)
ANION GAP SERPL CALC-SCNC: 10 MMOL/L — SIGNIFICANT CHANGE UP (ref 5–17)
AST SERPL-CCNC: 66 U/L — HIGH (ref 10–40)
BASOPHILS # BLD AUTO: 0 K/UL — SIGNIFICANT CHANGE UP (ref 0–0.2)
BASOPHILS NFR BLD AUTO: 0 % — SIGNIFICANT CHANGE UP (ref 0–2)
BILIRUB SERPL-MCNC: 0.3 MG/DL — SIGNIFICANT CHANGE UP (ref 0.2–1.2)
BUN SERPL-MCNC: 11 MG/DL — SIGNIFICANT CHANGE UP (ref 7–23)
CALCIUM SERPL-MCNC: 8.3 MG/DL — LOW (ref 8.4–10.5)
CHLORIDE SERPL-SCNC: 110 MMOL/L — HIGH (ref 96–108)
CO2 SERPL-SCNC: 23 MMOL/L — SIGNIFICANT CHANGE UP (ref 22–31)
CREAT SERPL-MCNC: 0.54 MG/DL — SIGNIFICANT CHANGE UP (ref 0.5–1.3)
CRP SERPL-MCNC: 3.12 MG/DL — HIGH (ref 0–0.4)
D DIMER BLD IA.RAPID-MCNC: 487 NG/ML DDU — HIGH
EOSINOPHIL # BLD AUTO: 0 K/UL — SIGNIFICANT CHANGE UP (ref 0–0.5)
EOSINOPHIL NFR BLD AUTO: 0 % — SIGNIFICANT CHANGE UP (ref 0–6)
FERRITIN SERPL-MCNC: 237 NG/ML — HIGH (ref 15–150)
GLUCOSE BLDC GLUCOMTR-MCNC: 200 MG/DL — HIGH (ref 70–99)
GLUCOSE BLDC GLUCOMTR-MCNC: 241 MG/DL — HIGH (ref 70–99)
GLUCOSE BLDC GLUCOMTR-MCNC: 251 MG/DL — HIGH (ref 70–99)
GLUCOSE BLDC GLUCOMTR-MCNC: 309 MG/DL — HIGH (ref 70–99)
GLUCOSE SERPL-MCNC: 200 MG/DL — HIGH (ref 70–99)
HCT VFR BLD CALC: 41.8 % — SIGNIFICANT CHANGE UP (ref 34.5–45)
HGB BLD-MCNC: 13.2 G/DL — SIGNIFICANT CHANGE UP (ref 11.5–15.5)
LYMPHOCYTES # BLD AUTO: 0.73 K/UL — LOW (ref 1–3.3)
LYMPHOCYTES # BLD AUTO: 9.6 % — LOW (ref 13–44)
MAGNESIUM SERPL-MCNC: 2.1 MG/DL — SIGNIFICANT CHANGE UP (ref 1.6–2.6)
MANUAL SMEAR VERIFICATION: SIGNIFICANT CHANGE UP
MCHC RBC-ENTMCNC: 28.6 PG — SIGNIFICANT CHANGE UP (ref 27–34)
MCHC RBC-ENTMCNC: 31.6 GM/DL — LOW (ref 32–36)
MCV RBC AUTO: 90.7 FL — SIGNIFICANT CHANGE UP (ref 80–100)
MONOCYTES # BLD AUTO: 0.4 K/UL — SIGNIFICANT CHANGE UP (ref 0–0.9)
MONOCYTES NFR BLD AUTO: 5.3 % — SIGNIFICANT CHANGE UP (ref 2–14)
NEUTROPHILS # BLD AUTO: 6.36 K/UL — SIGNIFICANT CHANGE UP (ref 1.8–7.4)
NEUTROPHILS NFR BLD AUTO: 80.7 % — HIGH (ref 43–77)
NEUTS BAND # BLD: 2.6 % — SIGNIFICANT CHANGE UP (ref 0–8)
PHOSPHATE SERPL-MCNC: 3.8 MG/DL — SIGNIFICANT CHANGE UP (ref 2.5–4.5)
PLAT MORPH BLD: NORMAL — SIGNIFICANT CHANGE UP
PLATELET # BLD AUTO: 261 K/UL — SIGNIFICANT CHANGE UP (ref 150–400)
POLYCHROMASIA BLD QL SMEAR: SLIGHT — SIGNIFICANT CHANGE UP
POTASSIUM SERPL-MCNC: 3.8 MMOL/L — SIGNIFICANT CHANGE UP (ref 3.5–5.3)
POTASSIUM SERPL-SCNC: 3.8 MMOL/L — SIGNIFICANT CHANGE UP (ref 3.5–5.3)
PROT SERPL-MCNC: 7.6 G/DL — SIGNIFICANT CHANGE UP (ref 6–8.3)
RBC # BLD: 4.61 M/UL — SIGNIFICANT CHANGE UP (ref 3.8–5.2)
RBC # FLD: 14.6 % — HIGH (ref 10.3–14.5)
RBC BLD AUTO: ABNORMAL
SMUDGE CELLS # BLD: PRESENT — SIGNIFICANT CHANGE UP
SODIUM SERPL-SCNC: 143 MMOL/L — SIGNIFICANT CHANGE UP (ref 135–145)
VARIANT LYMPHS # BLD: 1.8 % — SIGNIFICANT CHANGE UP (ref 0–6)
WBC # BLD: 7.63 K/UL — SIGNIFICANT CHANGE UP (ref 3.8–10.5)
WBC # FLD AUTO: 7.63 K/UL — SIGNIFICANT CHANGE UP (ref 3.8–10.5)

## 2021-02-26 PROCEDURE — 71045 X-RAY EXAM CHEST 1 VIEW: CPT | Mod: 26

## 2021-02-26 PROCEDURE — 99233 SBSQ HOSP IP/OBS HIGH 50: CPT | Mod: GC

## 2021-02-26 PROCEDURE — 93970 EXTREMITY STUDY: CPT | Mod: 26

## 2021-02-26 RX ORDER — SODIUM CHLORIDE 9 MG/ML
250 INJECTION INTRAMUSCULAR; INTRAVENOUS; SUBCUTANEOUS ONCE
Refills: 0 | Status: COMPLETED | OUTPATIENT
Start: 2021-02-26 | End: 2021-02-26

## 2021-02-26 RX ORDER — POTASSIUM CHLORIDE 20 MEQ
20 PACKET (EA) ORAL ONCE
Refills: 0 | Status: COMPLETED | OUTPATIENT
Start: 2021-02-26 | End: 2021-02-26

## 2021-02-26 RX ORDER — DEXAMETHASONE 0.5 MG/5ML
6 ELIXIR ORAL DAILY
Refills: 0 | Status: COMPLETED | OUTPATIENT
Start: 2021-02-27 | End: 2021-03-05

## 2021-02-26 RX ORDER — METFORMIN HYDROCHLORIDE 850 MG/1
1 TABLET ORAL
Qty: 0 | Refills: 0 | DISCHARGE

## 2021-02-26 RX ADMIN — Medication 4: at 17:13

## 2021-02-26 RX ADMIN — REMDESIVIR 500 MILLIGRAM(S): 5 INJECTION INTRAVENOUS at 17:56

## 2021-02-26 RX ADMIN — Medication 20 MILLIEQUIVALENT(S): at 10:55

## 2021-02-26 RX ADMIN — Medication 6 MILLIGRAM(S): at 14:44

## 2021-02-26 RX ADMIN — Medication 200 MILLIGRAM(S): at 17:56

## 2021-02-26 RX ADMIN — Medication 6: at 22:28

## 2021-02-26 RX ADMIN — Medication 2: at 07:38

## 2021-02-26 RX ADMIN — Medication 200 MILLIGRAM(S): at 10:55

## 2021-02-26 RX ADMIN — Medication 8: at 12:06

## 2021-02-26 RX ADMIN — ENOXAPARIN SODIUM 40 MILLIGRAM(S): 100 INJECTION SUBCUTANEOUS at 17:56

## 2021-02-26 RX ADMIN — SODIUM CHLORIDE 500 MILLILITER(S): 9 INJECTION INTRAMUSCULAR; INTRAVENOUS; SUBCUTANEOUS at 08:32

## 2021-02-26 NOTE — PROGRESS NOTE ADULT - PROBLEM SELECTOR PLAN 1
In the setting of COVID PNA. Patient hypoxic on ED arrival, SpO2 81% on RA. VBG : 7.40/43/29/26 51% sat. Transition pt to HFNC for increased WOB  - goal SpO2 > 90%  - set at HFNC 50/60% this morning as pt was tachypneic with iWOB on previous setting 40/60%.   - elevate head of bed as increased WOB while laying down.  - incentive spirometry  - obtain dopplers of LE In the setting of COVID PNA. Patient hypoxic on ED arrival, SpO2 81% on RA. VBG : 7.40/43/29/26 51% sat. Transition pt to HFNC for increased WOB  - goal SpO2 > 90%  - set at HFNC 50/55% this morning and pt is doing well on that.  - elevate head of bed as increased WOB while laying down.  - incentive spirometry  - f/u dopplers  - instructed patient on proning while awake, In the setting of COVID PNA. Patient hypoxic on ED arrival, SpO2 81% on RA. VBG : 7.40/43/29/26 51% sat. Transition pt to HFNC for increased WOB  - goal SpO2 > 90%  - set at HFNC 50/55% this morning and pt is doing well on that.  - elevate head of bed as increased WOB while laying down.  - incentive spirometry  - f/u dopplers - negative for DVT  - instructed patient on proning while awake,

## 2021-02-26 NOTE — PROGRESS NOTE ADULT - SUBJECTIVE AND OBJECTIVE BOX
HOA GILMORE, 55y, Female  MRN-[id number]  Patient is a 55y old  Female who presents with a chief complaint of SOB (25 Feb 2021 07:18)      OVERNIGHT EVENTS: Complained of generalized itching, but no rash seen on exam.    SUBJECTIVE: Not complaining of itching this morning. States she feels much better, and that she only has mild chest pain associated with her dry cough.   -------------------------------------------------------------------------------  VITAL SIGNS:  Vital Signs Last 24 Hrs  T(C): 37 (26 Feb 2021 04:48), Max: 37 (26 Feb 2021 04:48)  T(F): 98.6 (26 Feb 2021 04:48), Max: 98.6 (26 Feb 2021 04:48)  HR: 51 (26 Feb 2021 04:00) (51 - 79)  BP: 82/52 (26 Feb 2021 04:00) (82/52 - 101/57)  BP(mean): 59 (26 Feb 2021 04:00) (59 - 66)  RR: 31 (26 Feb 2021 04:00) (27 - 31)  SpO2: 90% (26 Feb 2021 04:00) (90% - 94%)      I&O's Summary    25 Feb 2021 07:01  -  26 Feb 2021 07:00  --------------------------------------------------------  IN: 0 mL / OUT: 1300 mL / NET: -1300 mL        PHYSICAL EXAM:    General: NAD, well developed. Coughing intermittently on exam. Desaturated to 86% with movement in bed.  HEENT: NC/AT; anicteric sclera; moist mucosal membranes.  Neck: supple, trachea midline  Cardiovascular: RRR, +S1/S2; NO M/R/G  Respiratory: tachypneic but comfortable. CTA B/L; no W/R/R  Gastrointestinal: soft, NT/ND; +BSx4  Extremities: WWP; no edema or cyanosis  Vascular: 2+ radial  Neurological: AAOx3; no focal deficits    ALLERGIES:  Allergies    No Known Drug Allergies  Seafood (Swelling; Rash)    Intolerances        MEDICATIONS:  MEDICATIONS  (STANDING):  dexAMETHasone  Injectable 6 milliGRAM(s) IV Push every 24 hours  dextrose 40% Gel 15 Gram(s) Oral once  dextrose 5%. 1000 milliLiter(s) (50 mL/Hr) IV Continuous <Continuous>  dextrose 5%. 1000 milliLiter(s) (100 mL/Hr) IV Continuous <Continuous>  dextrose 50% Injectable 25 Gram(s) IV Push once  dextrose 50% Injectable 12.5 Gram(s) IV Push once  dextrose 50% Injectable 25 Gram(s) IV Push once  enoxaparin Injectable 40 milliGRAM(s) SubCutaneous every 24 hours  glucagon  Injectable 1 milliGRAM(s) IntraMuscular once  insulin lispro (ADMELOG) corrective regimen sliding scale   SubCutaneous Before meals and at bedtime  remdesivir  IVPB   IV Intermittent   remdesivir  IVPB 100 milliGRAM(s) IV Intermittent every 24 hours    MEDICATIONS  (PRN):  sodium chloride 0.65% Nasal 1 Spray(s) Both Nostrils three times a day PRN Nasal Congestion      -------------------------------------------------------------------------------  LABS:                        12.8   5.58  )-----------( 220      ( 25 Feb 2021 07:27 )             39.6     02-25    144  |  110<H>  |  9   ----------------------------<  169<H>  3.8   |  24  |  0.48<L>    Ca    8.6      25 Feb 2021 07:27  Phos  3.5     02-24  Mg     2.4     02-25    TPro  7.6  /  Alb  2.5<L>  /  TBili  0.3  /  DBili  x   /  AST  89<H>  /  ALT  32  /  AlkPhos  143<H>  02-25    LIVER FUNCTIONS - ( 25 Feb 2021 07:27 )  Alb: 2.5 g/dL / Pro: 7.6 g/dL / ALK PHOS: 143 U/L / ALT: 32 U/L / AST: 89 U/L / GGT: x           PT/INR - ( 24 Feb 2021 12:03 )   PT: 12.2 sec;   INR: 1.02          PTT - ( 24 Feb 2021 12:03 )  PTT:31.7 sec    Lactate, Blood: 1.5 mmol/L (02-24 @ 15:49)  Lactate, Blood: 3.2 mmol/L (02-24 @ 12:04)    CARDIAC MARKERS ( 24 Feb 2021 12:03 )  x     / <0.01 ng/mL / x     / x     / x            CAPILLARY BLOOD GLUCOSE      POCT Blood Glucose.: 200 mg/dL (26 Feb 2021 06:57)  POCT Blood Glucose.: 239 mg/dL (25 Feb 2021 21:42)  POCT Blood Glucose.: 209 mg/dL (25 Feb 2021 16:04)  POCT Blood Glucose.: 220 mg/dL (25 Feb 2021 12:30)        Culture - Blood (collected 24 Feb 2021 12:31)  Source: .Blood Blood-Peripheral  Preliminary Report (25 Feb 2021 13:01):    No growth at 1 day.    Culture - Blood (collected 24 Feb 2021 12:31)  Source: .Blood Blood-Peripheral  Preliminary Report (25 Feb 2021 13:01):    No growth at 1 day.        RADIOLOGY & ADDITIONAL TESTS: Reviewed.

## 2021-02-26 NOTE — PROGRESS NOTE ADULT - ASSESSMENT
55 F PMHx NIDDM, cholecystectomy 2018 at [facility] presents to ED for SOB, found to be COVID postive with increased WOB and sent to [facility] tele for increased monitoring and O2 requirements.

## 2021-02-27 LAB
ALBUMIN SERPL ELPH-MCNC: 2.5 G/DL — LOW (ref 3.3–5)
ALP SERPL-CCNC: 141 U/L — HIGH (ref 40–120)
ALT FLD-CCNC: 27 U/L — SIGNIFICANT CHANGE UP (ref 10–45)
ANION GAP SERPL CALC-SCNC: 13 MMOL/L — SIGNIFICANT CHANGE UP (ref 5–17)
AST SERPL-CCNC: 55 U/L — HIGH (ref 10–40)
BASOPHILS # BLD AUTO: 0 K/UL — SIGNIFICANT CHANGE UP (ref 0–0.2)
BASOPHILS NFR BLD AUTO: 0 % — SIGNIFICANT CHANGE UP (ref 0–2)
BILIRUB SERPL-MCNC: 0.3 MG/DL — SIGNIFICANT CHANGE UP (ref 0.2–1.2)
BUN SERPL-MCNC: 12 MG/DL — SIGNIFICANT CHANGE UP (ref 7–23)
BURR CELLS BLD QL SMEAR: PRESENT — SIGNIFICANT CHANGE UP
CALCIUM SERPL-MCNC: 8.6 MG/DL — SIGNIFICANT CHANGE UP (ref 8.4–10.5)
CHLORIDE SERPL-SCNC: 105 MMOL/L — SIGNIFICANT CHANGE UP (ref 96–108)
CO2 SERPL-SCNC: 23 MMOL/L — SIGNIFICANT CHANGE UP (ref 22–31)
CREAT SERPL-MCNC: 0.53 MG/DL — SIGNIFICANT CHANGE UP (ref 0.5–1.3)
CRP SERPL-MCNC: 1.51 MG/DL — HIGH (ref 0–0.4)
D DIMER BLD IA.RAPID-MCNC: 350 NG/ML DDU — HIGH
EOSINOPHIL # BLD AUTO: 0 K/UL — SIGNIFICANT CHANGE UP (ref 0–0.5)
EOSINOPHIL NFR BLD AUTO: 0 % — SIGNIFICANT CHANGE UP (ref 0–6)
FERRITIN SERPL-MCNC: 225 NG/ML — HIGH (ref 15–150)
GIANT PLATELETS BLD QL SMEAR: PRESENT — SIGNIFICANT CHANGE UP
GLUCOSE BLDC GLUCOMTR-MCNC: 229 MG/DL — HIGH (ref 70–99)
GLUCOSE BLDC GLUCOMTR-MCNC: 232 MG/DL — HIGH (ref 70–99)
GLUCOSE BLDC GLUCOMTR-MCNC: 236 MG/DL — HIGH (ref 70–99)
GLUCOSE BLDC GLUCOMTR-MCNC: 291 MG/DL — HIGH (ref 70–99)
GLUCOSE SERPL-MCNC: 230 MG/DL — HIGH (ref 70–99)
HCT VFR BLD CALC: 40.8 % — SIGNIFICANT CHANGE UP (ref 34.5–45)
HGB BLD-MCNC: 13.3 G/DL — SIGNIFICANT CHANGE UP (ref 11.5–15.5)
LYMPHOCYTES # BLD AUTO: 0.41 K/UL — LOW (ref 1–3.3)
LYMPHOCYTES # BLD AUTO: 5.3 % — LOW (ref 13–44)
MAGNESIUM SERPL-MCNC: 2.1 MG/DL — SIGNIFICANT CHANGE UP (ref 1.6–2.6)
MANUAL SMEAR VERIFICATION: SIGNIFICANT CHANGE UP
MCHC RBC-ENTMCNC: 28.7 PG — SIGNIFICANT CHANGE UP (ref 27–34)
MCHC RBC-ENTMCNC: 32.6 GM/DL — SIGNIFICANT CHANGE UP (ref 32–36)
MCV RBC AUTO: 88.1 FL — SIGNIFICANT CHANGE UP (ref 80–100)
MONOCYTES # BLD AUTO: 0.41 K/UL — SIGNIFICANT CHANGE UP (ref 0–0.9)
MONOCYTES NFR BLD AUTO: 5.3 % — SIGNIFICANT CHANGE UP (ref 2–14)
NEUTROPHILS # BLD AUTO: 6.75 K/UL — SIGNIFICANT CHANGE UP (ref 1.8–7.4)
NEUTROPHILS NFR BLD AUTO: 87.7 % — HIGH (ref 43–77)
PHOSPHATE SERPL-MCNC: 3.7 MG/DL — SIGNIFICANT CHANGE UP (ref 2.5–4.5)
PLAT MORPH BLD: NORMAL — SIGNIFICANT CHANGE UP
PLATELET # BLD AUTO: 270 K/UL — SIGNIFICANT CHANGE UP (ref 150–400)
POTASSIUM SERPL-MCNC: 4.5 MMOL/L — SIGNIFICANT CHANGE UP (ref 3.5–5.3)
POTASSIUM SERPL-SCNC: 4.5 MMOL/L — SIGNIFICANT CHANGE UP (ref 3.5–5.3)
PROT SERPL-MCNC: 7.4 G/DL — SIGNIFICANT CHANGE UP (ref 6–8.3)
RBC # BLD: 4.63 M/UL — SIGNIFICANT CHANGE UP (ref 3.8–5.2)
RBC # FLD: 14.6 % — HIGH (ref 10.3–14.5)
RBC BLD AUTO: ABNORMAL
SMUDGE CELLS # BLD: PRESENT — SIGNIFICANT CHANGE UP
SODIUM SERPL-SCNC: 141 MMOL/L — SIGNIFICANT CHANGE UP (ref 135–145)
TARGETS BLD QL SMEAR: SLIGHT — SIGNIFICANT CHANGE UP
VARIANT LYMPHS # BLD: 1.7 % — SIGNIFICANT CHANGE UP (ref 0–6)
WBC # BLD: 7.7 K/UL — SIGNIFICANT CHANGE UP (ref 3.8–10.5)
WBC # FLD AUTO: 7.7 K/UL — SIGNIFICANT CHANGE UP (ref 3.8–10.5)

## 2021-02-27 PROCEDURE — 71045 X-RAY EXAM CHEST 1 VIEW: CPT | Mod: 26

## 2021-02-27 PROCEDURE — 99233 SBSQ HOSP IP/OBS HIGH 50: CPT | Mod: GC

## 2021-02-27 RX ORDER — INSULIN GLARGINE 100 [IU]/ML
4 INJECTION, SOLUTION SUBCUTANEOUS AT BEDTIME
Refills: 0 | Status: DISCONTINUED | OUTPATIENT
Start: 2021-02-27 | End: 2021-02-28

## 2021-02-27 RX ORDER — INSULIN LISPRO 100/ML
5 VIAL (ML) SUBCUTANEOUS
Refills: 0 | Status: DISCONTINUED | OUTPATIENT
Start: 2021-02-27 | End: 2021-03-01

## 2021-02-27 RX ORDER — PANTOPRAZOLE SODIUM 20 MG/1
40 TABLET, DELAYED RELEASE ORAL
Refills: 0 | Status: DISCONTINUED | OUTPATIENT
Start: 2021-02-27 | End: 2021-03-05

## 2021-02-27 RX ORDER — SODIUM CHLORIDE 9 MG/ML
500 INJECTION INTRAMUSCULAR; INTRAVENOUS; SUBCUTANEOUS ONCE
Refills: 0 | Status: COMPLETED | OUTPATIENT
Start: 2021-02-27 | End: 2021-02-27

## 2021-02-27 RX ADMIN — INSULIN GLARGINE 4 UNIT(S): 100 INJECTION, SOLUTION SUBCUTANEOUS at 22:06

## 2021-02-27 RX ADMIN — Medication 4: at 06:58

## 2021-02-27 RX ADMIN — Medication 5 UNIT(S): at 11:31

## 2021-02-27 RX ADMIN — REMDESIVIR 500 MILLIGRAM(S): 5 INJECTION INTRAVENOUS at 14:47

## 2021-02-27 RX ADMIN — SODIUM CHLORIDE 1000 MILLILITER(S): 9 INJECTION INTRAMUSCULAR; INTRAVENOUS; SUBCUTANEOUS at 14:47

## 2021-02-27 RX ADMIN — ENOXAPARIN SODIUM 40 MILLIGRAM(S): 100 INJECTION SUBCUTANEOUS at 18:32

## 2021-02-27 RX ADMIN — Medication 6: at 11:31

## 2021-02-27 RX ADMIN — Medication 4: at 22:04

## 2021-02-27 RX ADMIN — Medication 6 MILLIGRAM(S): at 05:00

## 2021-02-27 RX ADMIN — Medication 4: at 18:31

## 2021-02-27 RX ADMIN — Medication 5 UNIT(S): at 18:31

## 2021-02-27 NOTE — PROGRESS NOTE ADULT - PROBLEM SELECTOR PLAN 5
Pt reports R upper sternal chest pain with inspiration, and when coughing. Pain is non-radiating, not associated with nausea or diaphoresis  - Symptomatically improved pain this morning.  - Trop T < 0.01  - continue to monitor

## 2021-02-27 NOTE — PROGRESS NOTE ADULT - ASSESSMENT
55 F PMHx NIDDM, cholecystectomy 2018 at [facility] presents to ED for SOB, found to be COVID postive with increased WOB and sent to [facility] tele for increased monitoring and O2 requirements.

## 2021-02-27 NOTE — PROGRESS NOTE ADULT - PROBLEM SELECTOR PLAN 1
In the setting of COVID PNA. Patient hypoxic on ED arrival, SpO2 81% on RA. VBG : 7.40/43/29/26 51% sat. Transition pt to HFNC for increased WOB  - goal SpO2 > 90%  - set at HFNC 40/55% this morning and pt is doing well on that.  - elevate head of bed as increased WOB while laying down.  - incentive spirometry  - f/u dopplers - negative for DVT  - instructed patient on proning while awake, Left decubitus morecomfortable given pts previous shoulder surgery

## 2021-02-27 NOTE — PROGRESS NOTE ADULT - SUBJECTIVE AND OBJECTIVE BOX
HOA GILMORE, 55y, Female  MRN-[id number]  Patient is a 55y old  Female who presents with a chief complaint of SOB (26 Feb 2021 07:26)      OVERNIGHT EVENTS: No acute issues ovn, pt rested comfortably on HFNC 40L/55%    SUBJECTIVE: Pt states she is feeling much better, denies complaints of SOB, increase WOB. Pt still complains of pleuritic cp but states it feels better then before. Denies N/V/D/ or any abdominal pain.   -------------------------------------------------------------------------------  VITAL SIGNS:  Vital Signs Last 24 Hrs  T(C): 36.3 (27 Feb 2021 10:08), Max: 36.9 (26 Feb 2021 17:30)  T(F): 97.3 (27 Feb 2021 10:08), Max: 98.4 (26 Feb 2021 17:30)  HR: 52 (27 Feb 2021 09:15) (51 - 67)  BP: 92/50 (27 Feb 2021 09:15) (79/54 - 99/60)  BP(mean): 60 (27 Feb 2021 09:15) (60 - 60)  RR: 24 (27 Feb 2021 09:15) (22 - 33)  SpO2: 93% (27 Feb 2021 09:15) (90% - 95%)      I&O's Summary    26 Feb 2021 07:01  -  27 Feb 2021 07:00  --------------------------------------------------------  IN: 900 mL / OUT: 1200 mL / NET: -300 mL        PHYSICAL EXAM:    General: NAD, well developed  HEENT: NC/AT; EOMI, PERRLA, anicteric sclera; moist mucosal membranes.  Neck: supple, trachea midline  Cardiovascular: RRR, +S1/S2; NO M/R/G  Respiratory: Decreased breath sounds B/L; no W/R/R  Gastrointestinal: soft, NT/ND; +BSx4  Extremities: WWP; no edema or cyanosis  Vascular: 2+ radial, DP/PT pulses B/L  Neurological: AAOx3; no focal deficits    ALLERGIES:  Allergies    No Known Drug Allergies  Seafood (Swelling; Rash)    Intolerances      MEDICATIONS:  MEDICATIONS  (STANDING):  dexAMETHasone     Tablet 6 milliGRAM(s) Oral daily  dextrose 40% Gel 15 Gram(s) Oral once  dextrose 5%. 1000 milliLiter(s) (50 mL/Hr) IV Continuous <Continuous>  dextrose 5%. 1000 milliLiter(s) (100 mL/Hr) IV Continuous <Continuous>  dextrose 50% Injectable 25 Gram(s) IV Push once  dextrose 50% Injectable 12.5 Gram(s) IV Push once  dextrose 50% Injectable 25 Gram(s) IV Push once  enoxaparin Injectable 40 milliGRAM(s) SubCutaneous every 24 hours  glucagon  Injectable 1 milliGRAM(s) IntraMuscular once  insulin glargine Injectable (LANTUS) 4 Unit(s) SubCutaneous at bedtime  insulin lispro (ADMELOG) corrective regimen sliding scale   SubCutaneous Before meals and at bedtime  insulin lispro Injectable (ADMELOG) 5 Unit(s) SubCutaneous three times a day before meals  remdesivir  IVPB   IV Intermittent   remdesivir  IVPB 100 milliGRAM(s) IV Intermittent every 24 hours    MEDICATIONS  (PRN):  guaiFENesin   Syrup  (Sugar-Free) 200 milliGRAM(s) Oral every 6 hours PRN Cough  sodium chloride 0.65% Nasal 1 Spray(s) Both Nostrils three times a day PRN Nasal Congestion      -------------------------------------------------------------------------------  LABS:                        13.3   7.70  )-----------( 270      ( 27 Feb 2021 07:43 )             40.8     02-27    141  |  105  |  12  ----------------------------<  230<H>  4.5   |  23  |  0.53    Ca    8.6      27 Feb 2021 07:43  Phos  3.7     02-27  Mg     2.1     02-27    TPro  7.4  /  Alb  2.5<L>  /  TBili  0.3  /  DBili  x   /  AST  55<H>  /  ALT  27  /  AlkPhos  141<H>  02-27    LIVER FUNCTIONS - ( 27 Feb 2021 07:43 )  Alb: 2.5 g/dL / Pro: 7.4 g/dL / ALK PHOS: 141 U/L / ALT: 27 U/L / AST: 55 U/L / GGT: x               Lactate, Blood: 1.5 mmol/L (02-24 @ 15:49)  Lactate, Blood: 3.2 mmol/L (02-24 @ 12:04)        CAPILLARY BLOOD GLUCOSE      POCT Blood Glucose.: 229 mg/dL (27 Feb 2021 06:43)  POCT Blood Glucose.: 251 mg/dL (26 Feb 2021 21:44)  POCT Blood Glucose.: 241 mg/dL (26 Feb 2021 16:27)  POCT Blood Glucose.: 309 mg/dL (26 Feb 2021 11:46)        Culture - Blood (collected 24 Feb 2021 12:31)  Source: .Blood Blood-Peripheral  Preliminary Report (26 Feb 2021 13:00):    No growth at 2 days.    Culture - Blood (collected 24 Feb 2021 12:31)  Source: .Blood Blood-Peripheral  Preliminary Report (26 Feb 2021 13:00):    No growth at 2 days.        RADIOLOGY & ADDITIONAL TESTS: Reviewed.

## 2021-02-27 NOTE — PROGRESS NOTE ADULT - PROBLEM SELECTOR PLAN 3
Hx of DM, on home Metformin 500mg qd for the last 3 years  - A1C 11.   - mISS  - monitor FSG  - consistent carbs diet  - Added pre-pradial and basal coverage

## 2021-02-28 LAB
ALBUMIN SERPL ELPH-MCNC: 2.5 G/DL — LOW (ref 3.3–5)
ALP SERPL-CCNC: 139 U/L — HIGH (ref 40–120)
ALT FLD-CCNC: 24 U/L — SIGNIFICANT CHANGE UP (ref 10–45)
ANION GAP SERPL CALC-SCNC: 9 MMOL/L — SIGNIFICANT CHANGE UP (ref 5–17)
AST SERPL-CCNC: 47 U/L — HIGH (ref 10–40)
BASOPHILS # BLD AUTO: 0 K/UL — SIGNIFICANT CHANGE UP (ref 0–0.2)
BASOPHILS NFR BLD AUTO: 0 % — SIGNIFICANT CHANGE UP (ref 0–2)
BILIRUB SERPL-MCNC: 0.4 MG/DL — SIGNIFICANT CHANGE UP (ref 0.2–1.2)
BUN SERPL-MCNC: 13 MG/DL — SIGNIFICANT CHANGE UP (ref 7–23)
CALCIUM SERPL-MCNC: 8.6 MG/DL — SIGNIFICANT CHANGE UP (ref 8.4–10.5)
CHLORIDE SERPL-SCNC: 107 MMOL/L — SIGNIFICANT CHANGE UP (ref 96–108)
CO2 SERPL-SCNC: 23 MMOL/L — SIGNIFICANT CHANGE UP (ref 22–31)
CREAT SERPL-MCNC: 0.55 MG/DL — SIGNIFICANT CHANGE UP (ref 0.5–1.3)
CRP SERPL-MCNC: 0.79 MG/DL — HIGH (ref 0–0.4)
D DIMER BLD IA.RAPID-MCNC: 345 NG/ML DDU — HIGH
EOSINOPHIL # BLD AUTO: 0 K/UL — SIGNIFICANT CHANGE UP (ref 0–0.5)
EOSINOPHIL NFR BLD AUTO: 0 % — SIGNIFICANT CHANGE UP (ref 0–6)
FERRITIN SERPL-MCNC: 193 NG/ML — HIGH (ref 15–150)
GLUCOSE BLDC GLUCOMTR-MCNC: 186 MG/DL — HIGH (ref 70–99)
GLUCOSE BLDC GLUCOMTR-MCNC: 241 MG/DL — HIGH (ref 70–99)
GLUCOSE BLDC GLUCOMTR-MCNC: 270 MG/DL — HIGH (ref 70–99)
GLUCOSE BLDC GLUCOMTR-MCNC: 313 MG/DL — HIGH (ref 70–99)
GLUCOSE SERPL-MCNC: 222 MG/DL — HIGH (ref 70–99)
HCT VFR BLD CALC: 40.9 % — SIGNIFICANT CHANGE UP (ref 34.5–45)
HGB BLD-MCNC: 13.2 G/DL — SIGNIFICANT CHANGE UP (ref 11.5–15.5)
IMM GRANULOCYTES NFR BLD AUTO: 0.3 % — SIGNIFICANT CHANGE UP (ref 0–1.5)
LYMPHOCYTES # BLD AUTO: 3.09 K/UL — SIGNIFICANT CHANGE UP (ref 1–3.3)
LYMPHOCYTES # BLD AUTO: 30.9 % — SIGNIFICANT CHANGE UP (ref 13–44)
MAGNESIUM SERPL-MCNC: 2 MG/DL — SIGNIFICANT CHANGE UP (ref 1.6–2.6)
MCHC RBC-ENTMCNC: 28.6 PG — SIGNIFICANT CHANGE UP (ref 27–34)
MCHC RBC-ENTMCNC: 32.3 GM/DL — SIGNIFICANT CHANGE UP (ref 32–36)
MCV RBC AUTO: 88.7 FL — SIGNIFICANT CHANGE UP (ref 80–100)
MONOCYTES # BLD AUTO: 0.88 K/UL — SIGNIFICANT CHANGE UP (ref 0–0.9)
MONOCYTES NFR BLD AUTO: 8.8 % — SIGNIFICANT CHANGE UP (ref 2–14)
NEUTROPHILS # BLD AUTO: 6 K/UL — SIGNIFICANT CHANGE UP (ref 1.8–7.4)
NEUTROPHILS NFR BLD AUTO: 60 % — SIGNIFICANT CHANGE UP (ref 43–77)
NRBC # BLD: 0 /100 WBCS — SIGNIFICANT CHANGE UP (ref 0–0)
PHOSPHATE SERPL-MCNC: 2.9 MG/DL — SIGNIFICANT CHANGE UP (ref 2.5–4.5)
PLATELET # BLD AUTO: 296 K/UL — SIGNIFICANT CHANGE UP (ref 150–400)
POTASSIUM SERPL-MCNC: 3.7 MMOL/L — SIGNIFICANT CHANGE UP (ref 3.5–5.3)
POTASSIUM SERPL-SCNC: 3.7 MMOL/L — SIGNIFICANT CHANGE UP (ref 3.5–5.3)
PROT SERPL-MCNC: 6.7 G/DL — SIGNIFICANT CHANGE UP (ref 6–8.3)
RBC # BLD: 4.61 M/UL — SIGNIFICANT CHANGE UP (ref 3.8–5.2)
RBC # FLD: 14.2 % — SIGNIFICANT CHANGE UP (ref 10.3–14.5)
SODIUM SERPL-SCNC: 139 MMOL/L — SIGNIFICANT CHANGE UP (ref 135–145)
WBC # BLD: 10 K/UL — SIGNIFICANT CHANGE UP (ref 3.8–10.5)
WBC # FLD AUTO: 10 K/UL — SIGNIFICANT CHANGE UP (ref 3.8–10.5)

## 2021-02-28 PROCEDURE — 99233 SBSQ HOSP IP/OBS HIGH 50: CPT | Mod: GC

## 2021-02-28 RX ORDER — SODIUM CHLORIDE 9 MG/ML
500 INJECTION, SOLUTION INTRAVENOUS ONCE
Refills: 0 | Status: COMPLETED | OUTPATIENT
Start: 2021-02-28 | End: 2021-02-28

## 2021-02-28 RX ORDER — INSULIN GLARGINE 100 [IU]/ML
6 INJECTION, SOLUTION SUBCUTANEOUS AT BEDTIME
Refills: 0 | Status: DISCONTINUED | OUTPATIENT
Start: 2021-02-28 | End: 2021-03-01

## 2021-02-28 RX ORDER — POTASSIUM CHLORIDE 20 MEQ
40 PACKET (EA) ORAL ONCE
Refills: 0 | Status: COMPLETED | OUTPATIENT
Start: 2021-02-28 | End: 2021-02-28

## 2021-02-28 RX ADMIN — Medication 2: at 07:50

## 2021-02-28 RX ADMIN — SODIUM CHLORIDE 500 MILLILITER(S): 9 INJECTION, SOLUTION INTRAVENOUS at 02:15

## 2021-02-28 RX ADMIN — Medication 5 UNIT(S): at 16:54

## 2021-02-28 RX ADMIN — Medication 6: at 21:46

## 2021-02-28 RX ADMIN — Medication 4: at 11:37

## 2021-02-28 RX ADMIN — Medication 5 UNIT(S): at 11:38

## 2021-02-28 RX ADMIN — Medication 40 MILLIEQUIVALENT(S): at 09:31

## 2021-02-28 RX ADMIN — REMDESIVIR 500 MILLIGRAM(S): 5 INJECTION INTRAVENOUS at 16:52

## 2021-02-28 RX ADMIN — Medication 6 MILLIGRAM(S): at 06:00

## 2021-02-28 RX ADMIN — PANTOPRAZOLE SODIUM 40 MILLIGRAM(S): 20 TABLET, DELAYED RELEASE ORAL at 06:00

## 2021-02-28 RX ADMIN — ENOXAPARIN SODIUM 40 MILLIGRAM(S): 100 INJECTION SUBCUTANEOUS at 16:53

## 2021-02-28 RX ADMIN — INSULIN GLARGINE 6 UNIT(S): 100 INJECTION, SOLUTION SUBCUTANEOUS at 21:45

## 2021-02-28 RX ADMIN — Medication 8: at 16:53

## 2021-02-28 RX ADMIN — Medication 5 UNIT(S): at 07:50

## 2021-02-28 NOTE — PROGRESS NOTE ADULT - PROBLEM SELECTOR PLAN 4
Alk phos 166, AST//42. on admission. Patient without any abdominal pain or GI complaints. Likely due to either Remdesevir or intravascular effects of COVID  - monitor CMP  - LFTs appear to be improving today.  - holding off on RUQ sono for now

## 2021-02-28 NOTE — PROGRESS NOTE ADULT - ASSESSMENT
55 F PMHx NIDDM, cholecystectomy 2018 at [facility] presents to ED for SOB, found to be COVID postive with increased WOB and sent to [facility] tele for increased monitoring and O2 requirements.

## 2021-02-28 NOTE — PROGRESS NOTE ADULT - PROBLEM SELECTOR PLAN 3
Hx of DM, on home Metformin 500mg qd for the last 3 years  - A1C 11.   - mISS + lantus 6U at bedtime + 5U TID before meals   - monitor FSG  - consistent carbs diet

## 2021-02-28 NOTE — PROGRESS NOTE ADULT - PROBLEM SELECTOR PLAN 1
In the setting of COVID PNA. Patient hypoxic on ED arrival, SpO2 81% on RA. VBG : 7.40/43/29/26 51% sat. Transition pt to HFNC for increased WOB  - goal SpO2 > 90%  - set at HFNC 50/55% this morning and pt is doing well on that.  - elevate head of bed as increased WOB while laying down.  - incentive spirometry  - f/u dopplers - negative for DVT  - instructed patient on proning while awake,

## 2021-02-28 NOTE — PROGRESS NOTE ADULT - SUBJECTIVE AND OBJECTIVE BOX
OVERNIGHT EVENTS: SBP 70s, given 250cc bolus     SUBJECTIVE / INTERVAL HPI: Patient seen and examined at bedside. Patient denies any shortness of breath, chest pain, etc. Feels well.     VITAL SIGNS:  Vital Signs Last 24 Hrs  T(C): 36.9 (28 Feb 2021 14:28), Max: 37.1 (28 Feb 2021 05:00)  T(F): 98.4 (28 Feb 2021 14:28), Max: 98.7 (28 Feb 2021 05:00)  HR: 55 (28 Feb 2021 12:00) (47 - 89)  BP: 95/44 (28 Feb 2021 12:00) (74/39 - 102/55)  BP(mean): 54 (28 Feb 2021 12:00) (47 - 67)  RR: 28 (28 Feb 2021 12:00) (20 - 30)  SpO2: 94% (28 Feb 2021 12:00) (90% - 95%)    02-27-21 @ 07:01  -  02-28-21 @ 07:00  --------------------------------------------------------  IN: 500 mL / OUT: 1300 mL / NET: -800 mL      PHYSICAL EXAM:    General: NAD, well developed.   HEENT: NC/AT; anicteric sclera; moist mucosal membranes.  Neck: supple, trachea midline  Cardiovascular: RRR, +S1/S2; No M/R/G  Respiratory: tachypneic but comfortable. CTA B/L; no W/R/R  Gastrointestinal: soft, NT/ND; +BSx4  Extremities: WWP; no edema or cyanosis  Vascular: 2+ radial  Neurological: AAOx3; no focal deficits    MEDICATIONS:  MEDICATIONS  (STANDING):  dexAMETHasone     Tablet 6 milliGRAM(s) Oral daily  dextrose 40% Gel 15 Gram(s) Oral once  dextrose 5%. 1000 milliLiter(s) (50 mL/Hr) IV Continuous <Continuous>  dextrose 5%. 1000 milliLiter(s) (100 mL/Hr) IV Continuous <Continuous>  dextrose 50% Injectable 25 Gram(s) IV Push once  dextrose 50% Injectable 12.5 Gram(s) IV Push once  dextrose 50% Injectable 25 Gram(s) IV Push once  enoxaparin Injectable 40 milliGRAM(s) SubCutaneous every 24 hours  glucagon  Injectable 1 milliGRAM(s) IntraMuscular once  insulin glargine Injectable (LANTUS) 4 Unit(s) SubCutaneous at bedtime  insulin lispro (ADMELOG) corrective regimen sliding scale   SubCutaneous Before meals and at bedtime  insulin lispro Injectable (ADMELOG) 5 Unit(s) SubCutaneous three times a day before meals  pantoprazole    Tablet 40 milliGRAM(s) Oral before breakfast  remdesivir  IVPB   IV Intermittent   remdesivir  IVPB 100 milliGRAM(s) IV Intermittent every 24 hours    MEDICATIONS  (PRN):  guaiFENesin   Syrup  (Sugar-Free) 200 milliGRAM(s) Oral every 6 hours PRN Cough  sodium chloride 0.65% Nasal 1 Spray(s) Both Nostrils three times a day PRN Nasal Congestion      ALLERGIES:  Allergies    No Known Drug Allergies  Seafood (Swelling; Rash)    Intolerances        LABS:                        13.2   10.00 )-----------( 296      ( 28 Feb 2021 07:07 )             40.9     02-28    139  |  107  |  13  ----------------------------<  222<H>  3.7   |  23  |  0.55    Ca    8.6      28 Feb 2021 07:07  Phos  2.9     02-28  Mg     2.0     02-28    TPro  6.7  /  Alb  2.5<L>  /  TBili  0.4  /  DBili  x   /  AST  47<H>  /  ALT  24  /  AlkPhos  139<H>  02-28        CAPILLARY BLOOD GLUCOSE      POCT Blood Glucose.: 241 mg/dL (28 Feb 2021 11:28)        RADIOLOGY & ADDITIONAL TESTS: Reviewed.

## 2021-03-01 LAB
ALBUMIN SERPL ELPH-MCNC: 2.5 G/DL — LOW (ref 3.3–5)
ALP SERPL-CCNC: 155 U/L — HIGH (ref 40–120)
ALT FLD-CCNC: 29 U/L — SIGNIFICANT CHANGE UP (ref 10–45)
ANION GAP SERPL CALC-SCNC: 10 MMOL/L — SIGNIFICANT CHANGE UP (ref 5–17)
AST SERPL-CCNC: 58 U/L — HIGH (ref 10–40)
BILIRUB SERPL-MCNC: 0.5 MG/DL — SIGNIFICANT CHANGE UP (ref 0.2–1.2)
BUN SERPL-MCNC: 12 MG/DL — SIGNIFICANT CHANGE UP (ref 7–23)
CALCIUM SERPL-MCNC: 8 MG/DL — LOW (ref 8.4–10.5)
CHLORIDE SERPL-SCNC: 107 MMOL/L — SIGNIFICANT CHANGE UP (ref 96–108)
CO2 SERPL-SCNC: 24 MMOL/L — SIGNIFICANT CHANGE UP (ref 22–31)
CREAT SERPL-MCNC: 0.53 MG/DL — SIGNIFICANT CHANGE UP (ref 0.5–1.3)
CRP SERPL-MCNC: 0.66 MG/DL — HIGH (ref 0–0.4)
CULTURE RESULTS: SIGNIFICANT CHANGE UP
CULTURE RESULTS: SIGNIFICANT CHANGE UP
D DIMER BLD IA.RAPID-MCNC: 381 NG/ML DDU — HIGH
FERRITIN SERPL-MCNC: 186 NG/ML — HIGH (ref 15–150)
GLUCOSE BLDC GLUCOMTR-MCNC: 203 MG/DL — HIGH (ref 70–99)
GLUCOSE BLDC GLUCOMTR-MCNC: 231 MG/DL — HIGH (ref 70–99)
GLUCOSE BLDC GLUCOMTR-MCNC: 276 MG/DL — HIGH (ref 70–99)
GLUCOSE BLDC GLUCOMTR-MCNC: 283 MG/DL — HIGH (ref 70–99)
GLUCOSE SERPL-MCNC: 226 MG/DL — HIGH (ref 70–99)
HCT VFR BLD CALC: 40.9 % — SIGNIFICANT CHANGE UP (ref 34.5–45)
HGB BLD-MCNC: 13.2 G/DL — SIGNIFICANT CHANGE UP (ref 11.5–15.5)
MAGNESIUM SERPL-MCNC: 1.9 MG/DL — SIGNIFICANT CHANGE UP (ref 1.6–2.6)
MCHC RBC-ENTMCNC: 28.5 PG — SIGNIFICANT CHANGE UP (ref 27–34)
MCHC RBC-ENTMCNC: 32.3 GM/DL — SIGNIFICANT CHANGE UP (ref 32–36)
MCV RBC AUTO: 88.3 FL — SIGNIFICANT CHANGE UP (ref 80–100)
NRBC # BLD: 0 /100 WBCS — SIGNIFICANT CHANGE UP (ref 0–0)
PHOSPHATE SERPL-MCNC: 3.2 MG/DL — SIGNIFICANT CHANGE UP (ref 2.5–4.5)
PLATELET # BLD AUTO: 211 K/UL — SIGNIFICANT CHANGE UP (ref 150–400)
POTASSIUM SERPL-MCNC: 4.2 MMOL/L — SIGNIFICANT CHANGE UP (ref 3.5–5.3)
POTASSIUM SERPL-SCNC: 4.2 MMOL/L — SIGNIFICANT CHANGE UP (ref 3.5–5.3)
PROT SERPL-MCNC: 6.7 G/DL — SIGNIFICANT CHANGE UP (ref 6–8.3)
RBC # BLD: 4.63 M/UL — SIGNIFICANT CHANGE UP (ref 3.8–5.2)
RBC # FLD: 14.1 % — SIGNIFICANT CHANGE UP (ref 10.3–14.5)
SODIUM SERPL-SCNC: 141 MMOL/L — SIGNIFICANT CHANGE UP (ref 135–145)
SPECIMEN SOURCE: SIGNIFICANT CHANGE UP
SPECIMEN SOURCE: SIGNIFICANT CHANGE UP
WBC # BLD: 9.8 K/UL — SIGNIFICANT CHANGE UP (ref 3.8–10.5)
WBC # FLD AUTO: 9.8 K/UL — SIGNIFICANT CHANGE UP (ref 3.8–10.5)

## 2021-03-01 PROCEDURE — 71045 X-RAY EXAM CHEST 1 VIEW: CPT | Mod: 26

## 2021-03-01 PROCEDURE — 99233 SBSQ HOSP IP/OBS HIGH 50: CPT | Mod: GC

## 2021-03-01 RX ORDER — INSULIN GLARGINE 100 [IU]/ML
10 INJECTION, SOLUTION SUBCUTANEOUS AT BEDTIME
Refills: 0 | Status: DISCONTINUED | OUTPATIENT
Start: 2021-03-01 | End: 2021-03-02

## 2021-03-01 RX ORDER — INSULIN LISPRO 100/ML
10 VIAL (ML) SUBCUTANEOUS
Refills: 0 | Status: DISCONTINUED | OUTPATIENT
Start: 2021-03-01 | End: 2021-03-02

## 2021-03-01 RX ADMIN — Medication 6: at 16:56

## 2021-03-01 RX ADMIN — Medication 5 UNIT(S): at 11:56

## 2021-03-01 RX ADMIN — Medication 5 UNIT(S): at 07:40

## 2021-03-01 RX ADMIN — PANTOPRAZOLE SODIUM 40 MILLIGRAM(S): 20 TABLET, DELAYED RELEASE ORAL at 05:38

## 2021-03-01 RX ADMIN — Medication 6 MILLIGRAM(S): at 05:38

## 2021-03-01 RX ADMIN — Medication 4: at 21:51

## 2021-03-01 RX ADMIN — Medication 4: at 07:21

## 2021-03-01 RX ADMIN — ENOXAPARIN SODIUM 40 MILLIGRAM(S): 100 INJECTION SUBCUTANEOUS at 17:00

## 2021-03-01 RX ADMIN — INSULIN GLARGINE 10 UNIT(S): 100 INJECTION, SOLUTION SUBCUTANEOUS at 21:54

## 2021-03-01 RX ADMIN — Medication 10 UNIT(S): at 16:56

## 2021-03-01 RX ADMIN — Medication 6: at 11:56

## 2021-03-01 NOTE — PROGRESS NOTE ADULT - PROBLEM SELECTOR PLAN 2
Pt presents with dry cough, shortness of breath for 3 days. Hypoxic to 81% on RA. Imaging showing bilateral ground glass opacities highly suspicious for COVID-19.  - Dexamethasone 6mg PO x 10 days (2/24-3/5)  - Remdesivir taper x 5 days (2/24-2/28) if LFTs continue to improve.  - Continue to trend daily LFTs, ferritin, CRP, d-dimer  - Tylenol PRN for fever  - Supplemental O2, wean as tolerated    #Protein gap  Pt with gap 5.8. Likely in the setting of active viral infection w/ COVID  - HIV negative  - Hepatitis panel negative.  - holding off on SPEP, UPEP, Immunofixation given low suspicion for gammopathy -COVID-19 postive on 2/24  -S/p Remdesivir tx on 2/28  -Dexamethasone 6mg PO x 10 days (2/24-3/5)  -Continue to trend daily LFTs, ferritin, CRP, d-dimer  -Tylenol PRN for fever    #Protein gap  Pt with gap 5.8. Likely in the setting of active viral infection w/ COVID  - HIV negative, Hepatitis panel negative.  - holding off on SPEP, UPEP, Immunofixation given low suspicion for gammopathy -COVID-19 postive on 2/24  -S/p Remdesivir tx on 2/28  -Dexamethasone 6mg PO x 10 days (2/24-3/5)  -Continue to trend daily LFTs, ferritin, CRP, d-dimer  -Tylenol PRN for fever    #Sepsis  On admission, meeting  2/4 SIRS criteria (HR 97, RR 24) on ED arrival, likely in the setting of COVID PNA.  - Bcx (2/24): NGTD  - Resolved    #Protein gap  Pt with gap 5.8. Likely in the setting of active viral infection w/ COVID  - HIV negative, Hepatitis panel negative.  - holding off on SPEP, UPEP, Immunofixation given low suspicion for gammopathy

## 2021-03-01 NOTE — DIETITIAN INITIAL EVALUATION ADULT. - PROBLEM SELECTOR PLAN 4
Alk phos 166, AST//42. Patient without any abdominal pain or GI complaints  - f/u GGT  - f/u hepatitis panel  - monitor CMP  - holding off on RUQ sono for now, unless uptrending LFTs

## 2021-03-01 NOTE — PROGRESS NOTE ADULT - PROBLEM SELECTOR PLAN 4
Alk phos 166, AST//42. on admission. Patient without any abdominal pain or GI complaints. Likely due to either Remdesevir or intravascular effects of COVID  - monitor CMP  - LFTs appear to be improving today.  - holding off on RUQ sono for now Alk phos 166, AST//42. on admission. Patient without any abdominal pain or GI complaints. Likely due to either Remdesevir or intravascular effects of COVID  -RESOLVED. Daily CMPs

## 2021-03-01 NOTE — PROGRESS NOTE ADULT - PROBLEM SELECTOR PLAN 5
Pt reports R upper sternal chest pain with inspiration, and when coughing. Pain is non-radiating, not associated with nausea or diaphoresis  - no longer complaining of pain this morning.  - Trop T < 0.01  - continue to monitor Pt reports R upper sternal chest pain with inspiration, and when coughing. Pain is non-radiating, not associated with nausea or diaphoresis  -Trop negative, no longer symptomatic. Continue to monitor

## 2021-03-01 NOTE — PHYSICAL THERAPY INITIAL EVALUATION ADULT - PRECAUTIONS/LIMITATIONS, REHAB EVAL
Patient has an appointment with pediatric urology in January  Mother given return precautions   Currently on HFNC 70%FiO2, 40LLPM/oxygen therapy device and L/min

## 2021-03-01 NOTE — PROGRESS NOTE ADULT - ASSESSMENT
55 F PMHx NIDDM, cholecystectomy 2018 at [facility] presents to ED for SOB, found to be COVID postive with increased WOB and sent to [facility] tele for increased monitoring and O2 requirements.  55 F PMHx NIDDM, cholecystectomy 2018 at [facility] presents to ED for SOB, found to be COVID postive with increased WOB and sent to [facility] tele for increased monitoring and O2 requirements. Currently s/p Remdesivir tx, on dexamethasone, weening off HFNC as tolerated. PT consulted.

## 2021-03-01 NOTE — DIETITIAN INITIAL EVALUATION ADULT. - PROBLEM SELECTOR PLAN 2
Pt presents with dry cough, shortness of breath for 3 days. Hypoxic to 81% on RA. Imaging showing bilateral ground glass opacities highly suspicious for COVID-19.   - Lymphopenic, ferritin 363, CRP 7.79, procal 0.34, d-dimer 289, AST//42  - f/u COVID-19 PCR  - Dexamethasone 6mg PO x 10 days  - Remdesivir taper x 5 days, monitor LFTs (d/c if LFTs uptrending)  - Continue to trend daily LFTs, ferritin, CRP, d-dimer  - Tylenol PRN for fever  - Supplemental O2, wean as tolerated    #Protein gap  Pt with gap 5.8. Likely in the setting of active viral infection w/ COVID  - f/u HIV  - f/u viral hepatitis panel  - holding off on SPEP, UPEP, Immunofixation given low suspicion for gammopathy

## 2021-03-01 NOTE — PROGRESS NOTE ADULT - SUBJECTIVE AND OBJECTIVE BOX
Medicine Progress Note    Patient is a 55y old  Female who presents with a chief complaint of SOB (28 Feb 2021 15:37)      SUBJECTIVE / OVERNIGHT EVENTS:    ADDITIONAL REVIEW OF SYSTEMS:    MEDICATIONS  (STANDING):  dexAMETHasone     Tablet 6 milliGRAM(s) Oral daily  dextrose 40% Gel 15 Gram(s) Oral once  dextrose 5%. 1000 milliLiter(s) (50 mL/Hr) IV Continuous <Continuous>  dextrose 5%. 1000 milliLiter(s) (100 mL/Hr) IV Continuous <Continuous>  dextrose 50% Injectable 25 Gram(s) IV Push once  dextrose 50% Injectable 12.5 Gram(s) IV Push once  dextrose 50% Injectable 25 Gram(s) IV Push once  enoxaparin Injectable 40 milliGRAM(s) SubCutaneous every 24 hours  glucagon  Injectable 1 milliGRAM(s) IntraMuscular once  insulin glargine Injectable (LANTUS) 6 Unit(s) SubCutaneous at bedtime  insulin lispro (ADMELOG) corrective regimen sliding scale   SubCutaneous Before meals and at bedtime  insulin lispro Injectable (ADMELOG) 5 Unit(s) SubCutaneous three times a day before meals  pantoprazole    Tablet 40 milliGRAM(s) Oral before breakfast    MEDICATIONS  (PRN):  guaiFENesin   Syrup  (Sugar-Free) 200 milliGRAM(s) Oral every 6 hours PRN Cough  sodium chloride 0.65% Nasal 1 Spray(s) Both Nostrils three times a day PRN Nasal Congestion    CAPILLARY BLOOD GLUCOSE      POCT Blood Glucose.: 203 mg/dL (01 Mar 2021 06:30)  POCT Blood Glucose.: 270 mg/dL (28 Feb 2021 21:37)  POCT Blood Glucose.: 313 mg/dL (28 Feb 2021 16:25)  POCT Blood Glucose.: 241 mg/dL (28 Feb 2021 11:28)    I&O's Summary    28 Feb 2021 07:01  -  01 Mar 2021 07:00  --------------------------------------------------------  IN: 0 mL / OUT: 400 mL / NET: -400 mL        PHYSICAL EXAM:  Vital Signs Last 24 Hrs  T(C): 36.6 (01 Mar 2021 04:26), Max: 36.9 (28 Feb 2021 14:28)  T(F): 97.8 (01 Mar 2021 04:26), Max: 98.4 (28 Feb 2021 14:28)  HR: 52 (01 Mar 2021 04:20) (50 - 89)  BP: 92/47 (01 Mar 2021 04:19) (89/50 - 99/58)  BP(mean): 58 (01 Mar 2021 04:19) (54 - 67)  RR: 24 (01 Mar 2021 04:20) (22 - 36)  SpO2: 94% (01 Mar 2021 04:20) (90% - 96%)  CONSTITUTIONAL: NAD, well-developed, well-groomed  ENMT: Moist oral mucosa, no pharyngeal injection or exudates; normal dentition  RESPIRATORY: Normal respiratory effort; lungs are clear to auscultation bilaterally  CARDIOVASCULAR: Regular rate and rhythm, normal S1 and S2, no murmur/rub/gallop; No lower extremity edema; Peripheral pulses are 2+ bilaterally  ABDOMEN: Nontender to palpation, normoactive bowel sounds, no rebound/guarding; No hepatosplenomegaly  PSYCH: A+O to person, place, and time; affect appropriate  NEUROLOGY: CN 2-12 are intact and symmetric; no gross sensory deficits   SKIN: No rashes; no palpable lesions    LABS:                        13.2   9.80  )-----------( 211      ( 01 Mar 2021 07:19 )             40.9     03-01    141  |  107  |  x   ----------------------------<  x   4.2   |  x   |  x     Ca    8.6      28 Feb 2021 07:07  Phos  2.9     02-28  Mg     2.0     02-28    TPro  6.7  /  Alb  2.5<L>  /  TBili  0.4  /  DBili  x   /  AST  47<H>  /  ALT  24  /  AlkPhos  139<H>  02-28              SARS-CoV-2: Detected (24 Feb 2021 16:15)  COVID-19 PCR: Detected (24 Feb 2021 12:03)      RADIOLOGY & ADDITIONAL TESTS:  Imaging from Last 24 Hours:    Electrocardiogram/QTc Interval:    COORDINATION OF CARE:  Care Discussed with Consultants/Other Providers:   Medicine Progress Note    Patient is a 55y old  Female who presents with a chief complaint of SOB (28 Feb 2021 15:37)      SUBJECTIVE / OVERNIGHT EVENTS: Patient seen and examined at the bedside. States she feels a bit tired but overall breathing better. No overnight events. Denies CP, pain, N/V/D. All other ROS negative except those listed in subjective assessment.    ADDITIONAL REVIEW OF SYSTEMS:    MEDICATIONS  (STANDING):  dexAMETHasone     Tablet 6 milliGRAM(s) Oral daily  dextrose 40% Gel 15 Gram(s) Oral once  dextrose 5%. 1000 milliLiter(s) (50 mL/Hr) IV Continuous <Continuous>  dextrose 5%. 1000 milliLiter(s) (100 mL/Hr) IV Continuous <Continuous>  dextrose 50% Injectable 25 Gram(s) IV Push once  dextrose 50% Injectable 12.5 Gram(s) IV Push once  dextrose 50% Injectable 25 Gram(s) IV Push once  enoxaparin Injectable 40 milliGRAM(s) SubCutaneous every 24 hours  glucagon  Injectable 1 milliGRAM(s) IntraMuscular once  insulin glargine Injectable (LANTUS) 6 Unit(s) SubCutaneous at bedtime  insulin lispro (ADMELOG) corrective regimen sliding scale   SubCutaneous Before meals and at bedtime  insulin lispro Injectable (ADMELOG) 5 Unit(s) SubCutaneous three times a day before meals  pantoprazole    Tablet 40 milliGRAM(s) Oral before breakfast    MEDICATIONS  (PRN):  guaiFENesin   Syrup  (Sugar-Free) 200 milliGRAM(s) Oral every 6 hours PRN Cough  sodium chloride 0.65% Nasal 1 Spray(s) Both Nostrils three times a day PRN Nasal Congestion    CAPILLARY BLOOD GLUCOSE      POCT Blood Glucose.: 203 mg/dL (01 Mar 2021 06:30)  POCT Blood Glucose.: 270 mg/dL (28 Feb 2021 21:37)  POCT Blood Glucose.: 313 mg/dL (28 Feb 2021 16:25)  POCT Blood Glucose.: 241 mg/dL (28 Feb 2021 11:28)    I&O's Summary    28 Feb 2021 07:01  -  01 Mar 2021 07:00  --------------------------------------------------------  IN: 0 mL / OUT: 400 mL / NET: -400 mL        PHYSICAL EXAM:  Vital Signs Last 24 Hrs  T(C): 36.6 (01 Mar 2021 04:26), Max: 36.9 (28 Feb 2021 14:28)  T(F): 97.8 (01 Mar 2021 04:26), Max: 98.4 (28 Feb 2021 14:28)  HR: 52 (01 Mar 2021 04:20) (50 - 89)  BP: 92/47 (01 Mar 2021 04:19) (89/50 - 99/58)  BP(mean): 58 (01 Mar 2021 04:19) (54 - 67)  RR: 24 (01 Mar 2021 04:20) (22 - 36)  SpO2: 94% (01 Mar 2021 04:20) (90% - 96%)    CONSTITUTIONAL: NAD, well-developed, well-groomed  RESPIRATORY: Normal respiratory effort; lungs are clear to auscultation bilaterally  CARDIOVASCULAR: Regular rate and rhythm, normal S1 and S2, no murmur/rub/gallop; No lower extremity edema; Peripheral pulses are 2+ bilaterally  ABDOMEN: Nontender to palpation, normoactive bowel sounds, no rebound/guarding  PSYCH: A+O to person, place, and time; affect appropriate  NEUROLOGY: CN 2-12 are intact and symmetric; no gross sensory deficits   SKIN: No rashes; no palpable lesions    LABS:                        13.2   9.80  )-----------( 211      ( 01 Mar 2021 07:19 )             40.9     03-01    141  |  107  |  x   ----------------------------<  x   4.2   |  x   |  x     Ca    8.6      28 Feb 2021 07:07  Phos  2.9     02-28  Mg     2.0     02-28    TPro  6.7  /  Alb  2.5<L>  /  TBili  0.4  /  DBili  x   /  AST  47<H>  /  ALT  24  /  AlkPhos  139<H>  02-28      SARS-CoV-2: Detected (24 Feb 2021 16:15)  COVID-19 PCR: Detected (24 Feb 2021 12:03)      RADIOLOGY & ADDITIONAL TESTS:  Imaging from Last 24 Hours:    CXR [x] < from: Xray Chest 1 View- PORTABLE-Routine (Xray Chest 1 View- PORTABLE-Routine in AM.) (02.27.21 @ 06:55) >    Frontal examination of the chest demonstrates the heart to be within normal limits in transverse diameter.No interval change lung pathology in comparison to prior examination of the chest 2/26/2021. Degenerative changes thoracic spine.    IMPRESSION: No interval change lung pathology    < end of copied text >      Electrocardiogram/QTc Interval:    COORDINATION OF CARE:  Care Discussed with Consultants/Other Providers:   Medicine Progress Note    Patient is a 55y old  Female who presents with a chief complaint of SOB (28 Feb 2021 15:37)      SUBJECTIVE / OVERNIGHT EVENTS: Patient seen and examined at the bedside. States she feels a bit tired but overall breathing better. No overnight events. Denies CP, pain, N/V/D. All other ROS negative except those listed in subjective assessment. CXR significant for no accute changes however overpenetrated w/ blunting of costophrenic angles. Will get b/l lung US to access extent.    ADDITIONAL REVIEW OF SYSTEMS:    MEDICATIONS  (STANDING):  dexAMETHasone     Tablet 6 milliGRAM(s) Oral daily  dextrose 40% Gel 15 Gram(s) Oral once  dextrose 5%. 1000 milliLiter(s) (50 mL/Hr) IV Continuous <Continuous>  dextrose 5%. 1000 milliLiter(s) (100 mL/Hr) IV Continuous <Continuous>  dextrose 50% Injectable 25 Gram(s) IV Push once  dextrose 50% Injectable 12.5 Gram(s) IV Push once  dextrose 50% Injectable 25 Gram(s) IV Push once  enoxaparin Injectable 40 milliGRAM(s) SubCutaneous every 24 hours  glucagon  Injectable 1 milliGRAM(s) IntraMuscular once  insulin glargine Injectable (LANTUS) 6 Unit(s) SubCutaneous at bedtime  insulin lispro (ADMELOG) corrective regimen sliding scale   SubCutaneous Before meals and at bedtime  insulin lispro Injectable (ADMELOG) 5 Unit(s) SubCutaneous three times a day before meals  pantoprazole    Tablet 40 milliGRAM(s) Oral before breakfast    MEDICATIONS  (PRN):  guaiFENesin   Syrup  (Sugar-Free) 200 milliGRAM(s) Oral every 6 hours PRN Cough  sodium chloride 0.65% Nasal 1 Spray(s) Both Nostrils three times a day PRN Nasal Congestion    CAPILLARY BLOOD GLUCOSE      POCT Blood Glucose.: 203 mg/dL (01 Mar 2021 06:30)  POCT Blood Glucose.: 270 mg/dL (28 Feb 2021 21:37)  POCT Blood Glucose.: 313 mg/dL (28 Feb 2021 16:25)  POCT Blood Glucose.: 241 mg/dL (28 Feb 2021 11:28)    I&O's Summary    28 Feb 2021 07:01  -  01 Mar 2021 07:00  --------------------------------------------------------  IN: 0 mL / OUT: 400 mL / NET: -400 mL        PHYSICAL EXAM:  Vital Signs Last 24 Hrs  T(C): 36.6 (01 Mar 2021 04:26), Max: 36.9 (28 Feb 2021 14:28)  T(F): 97.8 (01 Mar 2021 04:26), Max: 98.4 (28 Feb 2021 14:28)  HR: 52 (01 Mar 2021 04:20) (50 - 89)  BP: 92/47 (01 Mar 2021 04:19) (89/50 - 99/58)  BP(mean): 58 (01 Mar 2021 04:19) (54 - 67)  RR: 24 (01 Mar 2021 04:20) (22 - 36)  SpO2: 94% (01 Mar 2021 04:20) (90% - 96%)    CONSTITUTIONAL: NAD, well-developed, well-groomed  RESPIRATORY: Normal respiratory effort; lungs are clear to auscultation bilaterally  CARDIOVASCULAR: Regular rate and rhythm, normal S1 and S2, no murmur/rub/gallop; No lower extremity edema; Peripheral pulses are 2+ bilaterally  ABDOMEN: Nontender to palpation, normoactive bowel sounds, no rebound/guarding  PSYCH: A+O to person, place, and time; affect appropriate  NEUROLOGY: CN 2-12 are intact and symmetric; no gross sensory deficits   SKIN: No rashes; no palpable lesions    LABS:                        13.2   9.80  )-----------( 211      ( 01 Mar 2021 07:19 )             40.9     03-01    141  |  107  |  x   ----------------------------<  x   4.2   |  x   |  x     Ca    8.6      28 Feb 2021 07:07  Phos  2.9     02-28  Mg     2.0     02-28    TPro  6.7  /  Alb  2.5<L>  /  TBili  0.4  /  DBili  x   /  AST  47<H>  /  ALT  24  /  AlkPhos  139<H>  02-28      SARS-CoV-2: Detected (24 Feb 2021 16:15)  COVID-19 PCR: Detected (24 Feb 2021 12:03)      RADIOLOGY & ADDITIONAL TESTS:  Imaging from Last 24 Hours:    CXR [x] < from: Xray Chest 1 View- PORTABLE-Routine (Xray Chest 1 View- PORTABLE-Routine in AM.) (02.27.21 @ 06:55) >    Frontal examination of the chest demonstrates the heart to be within normal limits in transverse diameter.No interval change lung pathology in comparison to prior examination of the chest 2/26/2021. Degenerative changes thoracic spine.    IMPRESSION: No interval change lung pathology    < end of copied text >      Electrocardiogram/QTc Interval:    COORDINATION OF CARE:  Care Discussed with Consultants/Other Providers:

## 2021-03-01 NOTE — PROGRESS NOTE ADULT - PROBLEM SELECTOR PLAN 3
Hx of DM, on home Metformin 500mg qd for the last 3 years  - A1C 11.   - mISS + lantus 6U at bedtime + 5U TID before meals   - monitor FSG  - consistent carbs diet Hx of DM, on home Metformin 500mg qd for the last 3 years  - A1C 11  - mISS + lantus 6U at bedtime + 5U TID before meals

## 2021-03-01 NOTE — DIETITIAN INITIAL EVALUATION ADULT. - OTHER INFO
55 F PMHx NIDDM, cholecystectomy 2018 at [facility] presents to ED for SOB, found to be COVID postive with increased WOB and sent to 2WO tele for increased monitoring and O2 requirements. Currently s/p Remdesivir tx, on dexamethasone, weening off HFNC as tolerated    Pt seen in room, sitting up in chair. Pt reports good appetite PTA and currently. Confirms seafood allergy. Pt currently on consistent carbohydrate diet, reports eating ~75% of meals. RD provided menu to pt and provided education on current diet as well as how to order meals. Pt receptive. Denies N/V/D/C at present. Pt reports UBW ~140lbs, consistent with ABW. No pain noted. Please see recs below. Will continue to follow per RD protocol.

## 2021-03-01 NOTE — PROGRESS NOTE ADULT - PROBLEM SELECTOR PLAN 1
In the setting of COVID PNA. Patient hypoxic on ED arrival, SpO2 81% on RA. VBG : 7.40/43/29/26 51% sat. Transition pt to HFNC for increased WOB  - goal SpO2 > 90%  - set at HFNC 50/55% this morning and pt is doing well on that.  - elevate head of bed as increased WOB while laying down.  - incentive spirometry  - f/u dopplers - negative for DVT  - instructed patient on proning while awake, In the setting of COVID PNA. Patient hypoxic on ED arrival, SpO2 81% on RA. VBG : 7.40/43/29/26 51% sat. Transition pt to HFNC for increased WOB  -B/l LE dopplers negative for DVT  -Currently 94% on HFNC 40/70  - incentive spirometry, prone while awake, head of bed up In the setting of COVID PNA. Patient hypoxic on ED arrival, SpO2 81% on RA. VBG : 7.40/43/29/26 51% sat. Transition pt to HFNC for increased WOB  -B/l LE dopplers negative for DVT  -Currently 94% on HFNC 40/70  -CXR 3/1 w/ no significant interval change however over penetrated w/ blunting of costophrenic angles. Will obtain b/l lung US to further access.  - incentive spirometry, encu rage prone while awake, head of bed up In the setting of COVID PNA. Patient hypoxic on ED arrival, SpO2 81% on RA. VBG : 7.40/43/29/26 51% sat. Transition pt to HFNC for increased WOB  -B/l LE dopplers negative for DVT  -Currently 94% on HFNC 40/70  -CXR 3/1 w/ no significant interval change however over penetrated w/ blunting of costophrenic angles. B/l lung US w/ mild B lines - no pleural effusion/PNX  - incentive spirometry, encu rage prone while awake, head of bed up

## 2021-03-01 NOTE — DIETITIAN INITIAL EVALUATION ADULT. - OTHER CALCULATIONS
IBW used for calculations as pt >120% of IBW, adjusted for hypermetabolic state 2/2 viral infection, age, fluids per team

## 2021-03-01 NOTE — DIETITIAN INITIAL EVALUATION ADULT. - ADD RECOMMEND
1. Continue consistent carbohydrate diet 2. Monitor %PO intake and need for ONS 3. Trend wts 4. Monitor lytes, glucose, skin 5. RD to remain available prn

## 2021-03-02 LAB
ALBUMIN SERPL ELPH-MCNC: 2.6 G/DL — LOW (ref 3.3–5)
ALP SERPL-CCNC: 176 U/L — HIGH (ref 40–120)
ALT FLD-CCNC: 35 U/L — SIGNIFICANT CHANGE UP (ref 10–45)
ANION GAP SERPL CALC-SCNC: 10 MMOL/L — SIGNIFICANT CHANGE UP (ref 5–17)
AST SERPL-CCNC: 63 U/L — HIGH (ref 10–40)
BILIRUB SERPL-MCNC: 0.6 MG/DL — SIGNIFICANT CHANGE UP (ref 0.2–1.2)
BUN SERPL-MCNC: 12 MG/DL — SIGNIFICANT CHANGE UP (ref 7–23)
CALCIUM SERPL-MCNC: 8.2 MG/DL — LOW (ref 8.4–10.5)
CHLORIDE SERPL-SCNC: 102 MMOL/L — SIGNIFICANT CHANGE UP (ref 96–108)
CO2 SERPL-SCNC: 24 MMOL/L — SIGNIFICANT CHANGE UP (ref 22–31)
CREAT SERPL-MCNC: 0.55 MG/DL — SIGNIFICANT CHANGE UP (ref 0.5–1.3)
CRP SERPL-MCNC: 8 MG/L — HIGH (ref 0–4)
D DIMER BLD IA.RAPID-MCNC: 394 NG/ML DDU — HIGH
FERRITIN SERPL-MCNC: 172 NG/ML — HIGH (ref 15–150)
GLUCOSE BLDC GLUCOMTR-MCNC: 152 MG/DL — HIGH (ref 70–99)
GLUCOSE BLDC GLUCOMTR-MCNC: 158 MG/DL — HIGH (ref 70–99)
GLUCOSE BLDC GLUCOMTR-MCNC: 224 MG/DL — HIGH (ref 70–99)
GLUCOSE BLDC GLUCOMTR-MCNC: 245 MG/DL — HIGH (ref 70–99)
GLUCOSE BLDC GLUCOMTR-MCNC: 275 MG/DL — HIGH (ref 70–99)
GLUCOSE SERPL-MCNC: 179 MG/DL — HIGH (ref 70–99)
HCT VFR BLD CALC: 38.8 % — SIGNIFICANT CHANGE UP (ref 34.5–45)
HGB BLD-MCNC: 12.9 G/DL — SIGNIFICANT CHANGE UP (ref 11.5–15.5)
MAGNESIUM SERPL-MCNC: 2.1 MG/DL — SIGNIFICANT CHANGE UP (ref 1.6–2.6)
MCHC RBC-ENTMCNC: 28.9 PG — SIGNIFICANT CHANGE UP (ref 27–34)
MCHC RBC-ENTMCNC: 33.2 GM/DL — SIGNIFICANT CHANGE UP (ref 32–36)
MCV RBC AUTO: 86.8 FL — SIGNIFICANT CHANGE UP (ref 80–100)
NRBC # BLD: 0 /100 WBCS — SIGNIFICANT CHANGE UP (ref 0–0)
PHOSPHATE SERPL-MCNC: 3.2 MG/DL — SIGNIFICANT CHANGE UP (ref 2.5–4.5)
PLATELET # BLD AUTO: 257 K/UL — SIGNIFICANT CHANGE UP (ref 150–400)
POTASSIUM SERPL-MCNC: 3.7 MMOL/L — SIGNIFICANT CHANGE UP (ref 3.5–5.3)
POTASSIUM SERPL-SCNC: 3.7 MMOL/L — SIGNIFICANT CHANGE UP (ref 3.5–5.3)
PROCALCITONIN SERPL-MCNC: 0.06 NG/ML — SIGNIFICANT CHANGE UP (ref 0.02–0.1)
PROT SERPL-MCNC: 6.6 G/DL — SIGNIFICANT CHANGE UP (ref 6–8.3)
RBC # BLD: 4.47 M/UL — SIGNIFICANT CHANGE UP (ref 3.8–5.2)
RBC # FLD: 14.3 % — SIGNIFICANT CHANGE UP (ref 10.3–14.5)
SODIUM SERPL-SCNC: 136 MMOL/L — SIGNIFICANT CHANGE UP (ref 135–145)
WBC # BLD: 11.33 K/UL — HIGH (ref 3.8–10.5)
WBC # FLD AUTO: 11.33 K/UL — HIGH (ref 3.8–10.5)

## 2021-03-02 PROCEDURE — 99233 SBSQ HOSP IP/OBS HIGH 50: CPT | Mod: GC

## 2021-03-02 RX ORDER — ACETAMINOPHEN 500 MG
650 TABLET ORAL EVERY 6 HOURS
Refills: 0 | Status: DISCONTINUED | OUTPATIENT
Start: 2021-03-02 | End: 2021-03-06

## 2021-03-02 RX ORDER — POTASSIUM CHLORIDE 20 MEQ
20 PACKET (EA) ORAL ONCE
Refills: 0 | Status: COMPLETED | OUTPATIENT
Start: 2021-03-02 | End: 2021-03-02

## 2021-03-02 RX ORDER — IBUPROFEN 200 MG
200 TABLET ORAL THREE TIMES A DAY
Refills: 0 | Status: DISCONTINUED | OUTPATIENT
Start: 2021-03-02 | End: 2021-03-05

## 2021-03-02 RX ORDER — TOCILIZUMAB 20 MG/ML
500 INJECTION, SOLUTION, CONCENTRATE INTRAVENOUS ONCE
Refills: 0 | Status: COMPLETED | OUTPATIENT
Start: 2021-03-02 | End: 2021-03-02

## 2021-03-02 RX ORDER — INSULIN GLARGINE 100 [IU]/ML
12 INJECTION, SOLUTION SUBCUTANEOUS AT BEDTIME
Refills: 0 | Status: DISCONTINUED | OUTPATIENT
Start: 2021-03-02 | End: 2021-03-03

## 2021-03-02 RX ORDER — INSULIN LISPRO 100/ML
13 VIAL (ML) SUBCUTANEOUS
Refills: 0 | Status: DISCONTINUED | OUTPATIENT
Start: 2021-03-02 | End: 2021-03-05

## 2021-03-02 RX ADMIN — ENOXAPARIN SODIUM 40 MILLIGRAM(S): 100 INJECTION SUBCUTANEOUS at 17:16

## 2021-03-02 RX ADMIN — Medication 20 MILLIEQUIVALENT(S): at 17:16

## 2021-03-02 RX ADMIN — Medication 10 UNIT(S): at 08:28

## 2021-03-02 RX ADMIN — INSULIN GLARGINE 12 UNIT(S): 100 INJECTION, SOLUTION SUBCUTANEOUS at 22:22

## 2021-03-02 RX ADMIN — Medication 4: at 11:54

## 2021-03-02 RX ADMIN — PANTOPRAZOLE SODIUM 40 MILLIGRAM(S): 20 TABLET, DELAYED RELEASE ORAL at 07:08

## 2021-03-02 RX ADMIN — Medication 2: at 08:27

## 2021-03-02 RX ADMIN — Medication 10 UNIT(S): at 17:15

## 2021-03-02 RX ADMIN — Medication 6: at 17:15

## 2021-03-02 RX ADMIN — Medication 6 MILLIGRAM(S): at 07:08

## 2021-03-02 RX ADMIN — Medication 10 UNIT(S): at 11:54

## 2021-03-02 RX ADMIN — Medication 4: at 22:21

## 2021-03-02 NOTE — PROGRESS NOTE ADULT - PROBLEM SELECTOR PLAN 5
Pt reports R upper sternal chest pain with inspiration, and when coughing. Pain is non-radiating, not associated with nausea or diaphoresis  -Trop negative, no longer symptomatic.   - Resolved.

## 2021-03-02 NOTE — PROGRESS NOTE ADULT - ASSESSMENT
55 F PMHx NIDDM, cholecystectomy 2018 at [facility] presents to ED for SOB, found to be COVID postive with increased WOB and sent to 2WO tele for increased monitoring and O2 requirements. Currently s/p Remdesivir tx, on dexamethasone, and on HFNC as tolerated. PT consulted.

## 2021-03-02 NOTE — PROGRESS NOTE ADULT - PROBLEM SELECTOR PLAN 3
Hx of DM, on home Metformin 500mg qd for the last 3 years  - A1C 11  - mISS + lantus 10U at bedtime + 10U TID before meals, appears controlled on this regimen.

## 2021-03-02 NOTE — PROGRESS NOTE ADULT - SUBJECTIVE AND OBJECTIVE BOX
HOA GILMORE, 55y, Female  MRN-[id number]  Patient is a 55y old  Female who presents with a chief complaint of SOB (01 Mar 2021 17:17)      OVERNIGHT EVENTS:     SUBJECTIVE:  -------------------------------------------------------------------------------  VITAL SIGNS:  Vital Signs Last 24 Hrs  T(C): 36.8 (02 Mar 2021 05:25), Max: 37.1 (01 Mar 2021 09:48)  T(F): 98.2 (02 Mar 2021 05:25), Max: 98.7 (01 Mar 2021 09:48)  HR: 49 (02 Mar 2021 04:28) (43 - 68)  BP: 90/48 (02 Mar 2021 04:28) (85/48 - 100/55)  BP(mean): 58 (02 Mar 2021 04:28) (57 - 64)  RR: 21 (02 Mar 2021 04:28) (21 - 28)  SpO2: 91% (02 Mar 2021 04:28) (90% - 98%)      I&O's Summary    01 Mar 2021 07:01  -  02 Mar 2021 07:00  --------------------------------------------------------  IN: 0 mL / OUT: 1100 mL / NET: -1100 mL        PHYSICAL EXAM:    General: NAD, well developed  HEENT: NC/AT; EOMI, PERRLA, anicteric sclera; moist mucosal membranes.  Neck: supple, trachea midline  Cardiovascular: RRR, +S1/S2; NO M/R/G  Respiratory: CTA B/L; no W/R/R  Gastrointestinal: soft, NT/ND; +BSx4  Extremities: WWP; no edema or cyanosis  Vascular: 2+ radial, DP/PT pulses B/L  Neurological: AAOx3; no focal deficits    ALLERGIES:  Allergies    No Known Drug Allergies  Seafood (Swelling; Rash)    Intolerances        MEDICATIONS:  MEDICATIONS  (STANDING):  dexAMETHasone     Tablet 6 milliGRAM(s) Oral daily  dextrose 40% Gel 15 Gram(s) Oral once  dextrose 5%. 1000 milliLiter(s) (50 mL/Hr) IV Continuous <Continuous>  dextrose 5%. 1000 milliLiter(s) (100 mL/Hr) IV Continuous <Continuous>  dextrose 50% Injectable 25 Gram(s) IV Push once  dextrose 50% Injectable 12.5 Gram(s) IV Push once  dextrose 50% Injectable 25 Gram(s) IV Push once  enoxaparin Injectable 40 milliGRAM(s) SubCutaneous every 24 hours  glucagon  Injectable 1 milliGRAM(s) IntraMuscular once  insulin glargine Injectable (LANTUS) 10 Unit(s) SubCutaneous at bedtime  insulin lispro (ADMELOG) corrective regimen sliding scale   SubCutaneous Before meals and at bedtime  insulin lispro Injectable (ADMELOG) 10 Unit(s) SubCutaneous three times a day before meals  pantoprazole    Tablet 40 milliGRAM(s) Oral before breakfast    MEDICATIONS  (PRN):  guaiFENesin   Syrup  (Sugar-Free) 200 milliGRAM(s) Oral every 6 hours PRN Cough  sodium chloride 0.65% Nasal 1 Spray(s) Both Nostrils three times a day PRN Nasal Congestion      -------------------------------------------------------------------------------  LABS:                        13.2   9.80  )-----------( 211      ( 01 Mar 2021 07:19 )             40.9     03-01    141  |  107  |  12  ----------------------------<  226<H>  4.2   |  24  |  0.53    Ca    8.0<L>      01 Mar 2021 07:18  Phos  3.2     03-01  Mg     1.9     03-01    TPro  6.7  /  Alb  2.5<L>  /  TBili  0.5  /  DBili  x   /  AST  58<H>  /  ALT  29  /  AlkPhos  155<H>  03-01    LIVER FUNCTIONS - ( 01 Mar 2021 07:18 )  Alb: 2.5 g/dL / Pro: 6.7 g/dL / ALK PHOS: 155 U/L / ALT: 29 U/L / AST: 58 U/L / GGT: x                       CAPILLARY BLOOD GLUCOSE      POCT Blood Glucose.: 158 mg/dL (02 Mar 2021 06:48)  POCT Blood Glucose.: 231 mg/dL (01 Mar 2021 21:34)  POCT Blood Glucose.: 276 mg/dL (01 Mar 2021 16:30)  POCT Blood Glucose.: 283 mg/dL (01 Mar 2021 11:36)          RADIOLOGY & ADDITIONAL TESTS: Reviewed.   HOA GILMORE, 55y, Female  MRN-[id number]  Patient is a 55y old  Female who presents with a chief complaint of SOB (01 Mar 2021 17:17)      OVERNIGHT EVENTS: MEGGAN.    SUBJECTIVE: Pt seen and examined at bedside this AM. States that her cough is much improved with minimal sputum production. She is eating and drinking well. Lies on L lateral decub positioning instead of full prone at night, because she had R shoulder surgery 1 year prior and is nervous is about full prone. Has been using incentive spirometer intermittently.     -------------------------------------------------------------------------------  VITAL SIGNS:  Vital Signs Last 24 Hrs  T(C): 36.8 (02 Mar 2021 05:25), Max: 37.1 (01 Mar 2021 09:48)  T(F): 98.2 (02 Mar 2021 05:25), Max: 98.7 (01 Mar 2021 09:48)  HR: 49 (02 Mar 2021 04:28) (43 - 68)  BP: 90/48 (02 Mar 2021 04:28) (85/48 - 100/55)  BP(mean): 58 (02 Mar 2021 04:28) (57 - 64)  RR: 21 (02 Mar 2021 04:28) (21 - 28)  SpO2: 91% (02 Mar 2021 04:28) (90% - 98%)      I&O's Summary    01 Mar 2021 07:01  -  02 Mar 2021 07:00  --------------------------------------------------------  IN: 0 mL / OUT: 1100 mL / NET: -1100 mL        PHYSICAL EXAM:    General: NAD, well developed  HEENT: NC/AT; EOMI, PERRLA, anicteric sclera; moist mucosal membranes.  Neck: supple, trachea midline  Cardiovascular: RRR, +S1/S2; NO M/R/G  Respiratory: CTA B/L; no W/R/R  Gastrointestinal: soft, NT/ND; +BSx4  Extremities: WWP; no edema or cyanosis  Vascular: 2+ radial, DP/PT pulses B/L  Neurological: AAOx3; no focal deficits    ALLERGIES:  Allergies    No Known Drug Allergies  Seafood (Swelling; Rash)    Intolerances        MEDICATIONS:  MEDICATIONS  (STANDING):  dexAMETHasone     Tablet 6 milliGRAM(s) Oral daily  dextrose 40% Gel 15 Gram(s) Oral once  dextrose 5%. 1000 milliLiter(s) (50 mL/Hr) IV Continuous <Continuous>  dextrose 5%. 1000 milliLiter(s) (100 mL/Hr) IV Continuous <Continuous>  dextrose 50% Injectable 25 Gram(s) IV Push once  dextrose 50% Injectable 12.5 Gram(s) IV Push once  dextrose 50% Injectable 25 Gram(s) IV Push once  enoxaparin Injectable 40 milliGRAM(s) SubCutaneous every 24 hours  glucagon  Injectable 1 milliGRAM(s) IntraMuscular once  insulin glargine Injectable (LANTUS) 10 Unit(s) SubCutaneous at bedtime  insulin lispro (ADMELOG) corrective regimen sliding scale   SubCutaneous Before meals and at bedtime  insulin lispro Injectable (ADMELOG) 10 Unit(s) SubCutaneous three times a day before meals  pantoprazole    Tablet 40 milliGRAM(s) Oral before breakfast    MEDICATIONS  (PRN):  guaiFENesin   Syrup  (Sugar-Free) 200 milliGRAM(s) Oral every 6 hours PRN Cough  sodium chloride 0.65% Nasal 1 Spray(s) Both Nostrils three times a day PRN Nasal Congestion      -------------------------------------------------------------------------------  LABS:                        13.2   9.80  )-----------( 211      ( 01 Mar 2021 07:19 )             40.9     03-01    141  |  107  |  12  ----------------------------<  226<H>  4.2   |  24  |  0.53    Ca    8.0<L>      01 Mar 2021 07:18  Phos  3.2     03-01  Mg     1.9     03-01    TPro  6.7  /  Alb  2.5<L>  /  TBili  0.5  /  DBili  x   /  AST  58<H>  /  ALT  29  /  AlkPhos  155<H>  03-01    LIVER FUNCTIONS - ( 01 Mar 2021 07:18 )  Alb: 2.5 g/dL / Pro: 6.7 g/dL / ALK PHOS: 155 U/L / ALT: 29 U/L / AST: 58 U/L / GGT: x                       CAPILLARY BLOOD GLUCOSE      POCT Blood Glucose.: 158 mg/dL (02 Mar 2021 06:48)  POCT Blood Glucose.: 231 mg/dL (01 Mar 2021 21:34)  POCT Blood Glucose.: 276 mg/dL (01 Mar 2021 16:30)  POCT Blood Glucose.: 283 mg/dL (01 Mar 2021 11:36)          RADIOLOGY & ADDITIONAL TESTS: Reviewed.   HOA GILMORE, 55y, Female  MRN-[id number]  Patient is a 55y old  Female who presents with a chief complaint of SOB (01 Mar 2021 17:17)      OVERNIGHT EVENTS: MEGGAN.    SUBJECTIVE: Pt seen and examined at bedside this AM. States that her cough is much improved with minimal sputum production. She is eating and drinking well. Lies on L lateral decub positioning instead of full prone at night, because she had R shoulder surgery 1 year prior and is nervous is about full prone. Has been using incentive spirometer intermittently.     -------------------------------------------------------------------------------  VITAL SIGNS:  Vital Signs Last 24 Hrs  T(C): 36.8 (02 Mar 2021 05:25), Max: 37.1 (01 Mar 2021 09:48)  T(F): 98.2 (02 Mar 2021 05:25), Max: 98.7 (01 Mar 2021 09:48)  HR: 49 (02 Mar 2021 04:28) (43 - 68)  BP: 90/48 (02 Mar 2021 04:28) (85/48 - 100/55)  BP(mean): 58 (02 Mar 2021 04:28) (57 - 64)  RR: 21 (02 Mar 2021 04:28) (21 - 28)  SpO2: 91% (02 Mar 2021 04:28) (90% - 98%)      I&O's Summary    01 Mar 2021 07:01  -  02 Mar 2021 07:00  --------------------------------------------------------  IN: 0 mL / OUT: 1100 mL / NET: -1100 mL        PHYSICAL EXAM:    General: NAD, lying in bed.  HEENT: NC/AT; moist mucosal membranes.  Neck: supple, trachea midline  Cardiovascular: RRR, +S1/S2; NO M/R/G  Respiratory: CTA B/L; no W/R/R  Gastrointestinal: soft, NT/ND; +BSx4  Extremities: WWP; no edema or cyanosis  Vascular: 2+ radial, DP/PT pulses B/L  Neurological: AAOx3; no focal deficits    ALLERGIES:  Allergies    No Known Drug Allergies  Seafood (Swelling; Rash)    Intolerances        MEDICATIONS:  MEDICATIONS  (STANDING):  dexAMETHasone     Tablet 6 milliGRAM(s) Oral daily  dextrose 40% Gel 15 Gram(s) Oral once  dextrose 5%. 1000 milliLiter(s) (50 mL/Hr) IV Continuous <Continuous>  dextrose 5%. 1000 milliLiter(s) (100 mL/Hr) IV Continuous <Continuous>  dextrose 50% Injectable 25 Gram(s) IV Push once  dextrose 50% Injectable 12.5 Gram(s) IV Push once  dextrose 50% Injectable 25 Gram(s) IV Push once  enoxaparin Injectable 40 milliGRAM(s) SubCutaneous every 24 hours  glucagon  Injectable 1 milliGRAM(s) IntraMuscular once  insulin glargine Injectable (LANTUS) 10 Unit(s) SubCutaneous at bedtime  insulin lispro (ADMELOG) corrective regimen sliding scale   SubCutaneous Before meals and at bedtime  insulin lispro Injectable (ADMELOG) 10 Unit(s) SubCutaneous three times a day before meals  pantoprazole    Tablet 40 milliGRAM(s) Oral before breakfast    MEDICATIONS  (PRN):  guaiFENesin   Syrup  (Sugar-Free) 200 milliGRAM(s) Oral every 6 hours PRN Cough  sodium chloride 0.65% Nasal 1 Spray(s) Both Nostrils three times a day PRN Nasal Congestion      -------------------------------------------------------------------------------  LABS:                        13.2   9.80  )-----------( 211      ( 01 Mar 2021 07:19 )             40.9     03-01    141  |  107  |  12  ----------------------------<  226<H>  4.2   |  24  |  0.53    Ca    8.0<L>      01 Mar 2021 07:18  Phos  3.2     03-01  Mg     1.9     03-01    TPro  6.7  /  Alb  2.5<L>  /  TBili  0.5  /  DBili  x   /  AST  58<H>  /  ALT  29  /  AlkPhos  155<H>  03-01    LIVER FUNCTIONS - ( 01 Mar 2021 07:18 )  Alb: 2.5 g/dL / Pro: 6.7 g/dL / ALK PHOS: 155 U/L / ALT: 29 U/L / AST: 58 U/L / GGT: x                       CAPILLARY BLOOD GLUCOSE      POCT Blood Glucose.: 158 mg/dL (02 Mar 2021 06:48)  POCT Blood Glucose.: 231 mg/dL (01 Mar 2021 21:34)  POCT Blood Glucose.: 276 mg/dL (01 Mar 2021 16:30)  POCT Blood Glucose.: 283 mg/dL (01 Mar 2021 11:36)          RADIOLOGY & ADDITIONAL TESTS: Reviewed.

## 2021-03-02 NOTE — PROGRESS NOTE ADULT - PROBLEM SELECTOR PLAN 1
In the setting of COVID PNA. Patient hypoxic on ED arrival, SpO2 81% on RA. VBG : 7.40/43/29/26 51% sat. Transition pt to HFNC for increased WOB  -B/l LE dopplers negative for DVT  -Currently 92-94% on HFNC 40/70  -CXR 3/1 w/ no significant interval change however over penetrated w/ blunting of costophrenic angles. B/l lung US w/ mild B lines - no pleural effusion/PNX  - incentive spirometry, encourage prone while awake, head of bed up  - explained to patient that she should fully prone, and can use pillows to help her R shoulder.

## 2021-03-02 NOTE — PROGRESS NOTE ADULT - PROBLEM SELECTOR PLAN 4
Alk phos 176, AST/ALT 63/35 today. ALP is uptrending.  Patient without any abdominal pain or GI complaints. Likely due to either Remdesevir or intravascular effects of COVID  -c/w CMPS to monitor.

## 2021-03-02 NOTE — PROGRESS NOTE ADULT - PROBLEM SELECTOR PLAN 2
-COVID-19 postive on 2/24  -S/p Remdesivir tx on 2/28  -Dexamethasone 6mg PO x 10 days (2/24-3/5)  -Continue to trend daily LFTs, ferritin, CRP, d-dimer  -Tylenol PRN for fever    #Sepsis  On admission, meeting  2/4 SIRS criteria (HR 97, RR 24) on ED arrival, likely in the setting of COVID PNA.  - Bcx (2/24): NGTD  - Resolved    #Protein gap  Pt with gap 5.8. Likely in the setting of active viral infection w/ COVID  - HIV negative, Hepatitis panel negative.  - holding off on SPEP, UPEP, Immunofixation given low suspicion for gammopathy

## 2021-03-03 DIAGNOSIS — R89.9 UNSPECIFIED ABNORMAL FINDING IN SPECIMENS FROM OTHER ORGANS, SYSTEMS AND TISSUES: ICD-10-CM

## 2021-03-03 LAB
ALBUMIN SERPL ELPH-MCNC: 2.3 G/DL — LOW (ref 3.3–5)
ALP SERPL-CCNC: 170 U/L — HIGH (ref 40–120)
ALT FLD-CCNC: 34 U/L — SIGNIFICANT CHANGE UP (ref 10–45)
ANION GAP SERPL CALC-SCNC: 10 MMOL/L — SIGNIFICANT CHANGE UP (ref 5–17)
AST SERPL-CCNC: 58 U/L — HIGH (ref 10–40)
BASOPHILS # BLD AUTO: 0.01 K/UL — SIGNIFICANT CHANGE UP (ref 0–0.2)
BASOPHILS NFR BLD AUTO: 0.1 % — SIGNIFICANT CHANGE UP (ref 0–2)
BILIRUB SERPL-MCNC: 0.5 MG/DL — SIGNIFICANT CHANGE UP (ref 0.2–1.2)
BUN SERPL-MCNC: 14 MG/DL — SIGNIFICANT CHANGE UP (ref 7–23)
CALCIUM SERPL-MCNC: 8.3 MG/DL — LOW (ref 8.4–10.5)
CHLORIDE SERPL-SCNC: 106 MMOL/L — SIGNIFICANT CHANGE UP (ref 96–108)
CO2 SERPL-SCNC: 24 MMOL/L — SIGNIFICANT CHANGE UP (ref 22–31)
CREAT SERPL-MCNC: 0.57 MG/DL — SIGNIFICANT CHANGE UP (ref 0.5–1.3)
EOSINOPHIL # BLD AUTO: 0 K/UL — SIGNIFICANT CHANGE UP (ref 0–0.5)
EOSINOPHIL NFR BLD AUTO: 0 % — SIGNIFICANT CHANGE UP (ref 0–6)
GLUCOSE BLDC GLUCOMTR-MCNC: 151 MG/DL — HIGH (ref 70–99)
GLUCOSE BLDC GLUCOMTR-MCNC: 227 MG/DL — HIGH (ref 70–99)
GLUCOSE BLDC GLUCOMTR-MCNC: 240 MG/DL — HIGH (ref 70–99)
GLUCOSE BLDC GLUCOMTR-MCNC: 255 MG/DL — HIGH (ref 70–99)
GLUCOSE SERPL-MCNC: 167 MG/DL — HIGH (ref 70–99)
HCT VFR BLD CALC: 39.8 % — SIGNIFICANT CHANGE UP (ref 34.5–45)
HGB BLD-MCNC: 12.8 G/DL — SIGNIFICANT CHANGE UP (ref 11.5–15.5)
IMM GRANULOCYTES NFR BLD AUTO: 0.7 % — SIGNIFICANT CHANGE UP (ref 0–1.5)
LYMPHOCYTES # BLD AUTO: 1.97 K/UL — SIGNIFICANT CHANGE UP (ref 1–3.3)
LYMPHOCYTES # BLD AUTO: 20.7 % — SIGNIFICANT CHANGE UP (ref 13–44)
MAGNESIUM SERPL-MCNC: 2.2 MG/DL — SIGNIFICANT CHANGE UP (ref 1.6–2.6)
MCHC RBC-ENTMCNC: 28.7 PG — SIGNIFICANT CHANGE UP (ref 27–34)
MCHC RBC-ENTMCNC: 32.2 GM/DL — SIGNIFICANT CHANGE UP (ref 32–36)
MCV RBC AUTO: 89.2 FL — SIGNIFICANT CHANGE UP (ref 80–100)
MONOCYTES # BLD AUTO: 0.8 K/UL — SIGNIFICANT CHANGE UP (ref 0–0.9)
MONOCYTES NFR BLD AUTO: 8.4 % — SIGNIFICANT CHANGE UP (ref 2–14)
NEUTROPHILS # BLD AUTO: 6.65 K/UL — SIGNIFICANT CHANGE UP (ref 1.8–7.4)
NEUTROPHILS NFR BLD AUTO: 70.1 % — SIGNIFICANT CHANGE UP (ref 43–77)
NRBC # BLD: 0 /100 WBCS — SIGNIFICANT CHANGE UP (ref 0–0)
PHOSPHATE SERPL-MCNC: 3.2 MG/DL — SIGNIFICANT CHANGE UP (ref 2.5–4.5)
PLATELET # BLD AUTO: 301 K/UL — SIGNIFICANT CHANGE UP (ref 150–400)
POTASSIUM SERPL-MCNC: 4.2 MMOL/L — SIGNIFICANT CHANGE UP (ref 3.5–5.3)
POTASSIUM SERPL-SCNC: 4.2 MMOL/L — SIGNIFICANT CHANGE UP (ref 3.5–5.3)
PROT SERPL-MCNC: 6.3 G/DL — SIGNIFICANT CHANGE UP (ref 6–8.3)
RBC # BLD: 4.46 M/UL — SIGNIFICANT CHANGE UP (ref 3.8–5.2)
RBC # FLD: 14.2 % — SIGNIFICANT CHANGE UP (ref 10.3–14.5)
SODIUM SERPL-SCNC: 140 MMOL/L — SIGNIFICANT CHANGE UP (ref 135–145)
WBC # BLD: 9.5 K/UL — SIGNIFICANT CHANGE UP (ref 3.8–10.5)
WBC # FLD AUTO: 9.5 K/UL — SIGNIFICANT CHANGE UP (ref 3.8–10.5)

## 2021-03-03 PROCEDURE — 99233 SBSQ HOSP IP/OBS HIGH 50: CPT | Mod: GC

## 2021-03-03 RX ORDER — INSULIN GLARGINE 100 [IU]/ML
18 INJECTION, SOLUTION SUBCUTANEOUS AT BEDTIME
Refills: 0 | Status: DISCONTINUED | OUTPATIENT
Start: 2021-03-03 | End: 2021-03-05

## 2021-03-03 RX ADMIN — Medication 6 MILLIGRAM(S): at 05:30

## 2021-03-03 RX ADMIN — Medication 4: at 12:28

## 2021-03-03 RX ADMIN — Medication 4: at 22:27

## 2021-03-03 RX ADMIN — INSULIN GLARGINE 18 UNIT(S): 100 INJECTION, SOLUTION SUBCUTANEOUS at 22:27

## 2021-03-03 RX ADMIN — Medication 2: at 07:23

## 2021-03-03 RX ADMIN — Medication 13 UNIT(S): at 16:45

## 2021-03-03 RX ADMIN — Medication 6: at 16:45

## 2021-03-03 RX ADMIN — Medication 13 UNIT(S): at 07:23

## 2021-03-03 RX ADMIN — ENOXAPARIN SODIUM 40 MILLIGRAM(S): 100 INJECTION SUBCUTANEOUS at 17:56

## 2021-03-03 RX ADMIN — PANTOPRAZOLE SODIUM 40 MILLIGRAM(S): 20 TABLET, DELAYED RELEASE ORAL at 05:30

## 2021-03-03 RX ADMIN — Medication 13 UNIT(S): at 12:28

## 2021-03-03 NOTE — PROGRESS NOTE ADULT - SUBJECTIVE AND OBJECTIVE BOX
O/N Events: Patient placed in prone position from 11:00 PM -> 12:00 AM.   Subjective/ROS: Patient stating her breathing has imporved signficantly.  She does not have cough, chest tightness, Denies HA, CP, SOB, n/v, changes in bowel/urinary habits.  12pt ROS otherwise negative.    VITALS  Vital Signs Last 24 Hrs  T(C): 36.7 (03 Mar 2021 09:47), Max: 37 (03 Mar 2021 06:59)  T(F): 98.1 (03 Mar 2021 09:47), Max: 98.6 (03 Mar 2021 06:59)  HR: 68 (03 Mar 2021 08:32) (50 - 68)  BP: 90/57 (03 Mar 2021 08:00) (80/43 - 92/53)  BP(mean): 52 (03 Mar 2021 04:43) (52 - 63)  RR: 26 (03 Mar 2021 08:32) (19 - 26)  SpO2: 94% (03 Mar 2021 08:32) (91% - 96%)    CAPILLARY BLOOD GLUCOSE      POCT Blood Glucose.: 151 mg/dL (03 Mar 2021 07:14)  POCT Blood Glucose.: 245 mg/dL (02 Mar 2021 21:25)  POCT Blood Glucose.: 275 mg/dL (02 Mar 2021 16:34)  POCT Blood Glucose.: 224 mg/dL (02 Mar 2021 11:41)      PHYSICAL EXAM  General: A&Ox3; NAD  Head: NC/AT; MMM; PERRL; EOMI;  Neck: Supple; no JVD  Respiratory: CTA B/L; no wheezes/crackles   Cardiovascular: Regular rhythm/rate; S1/S2   Gastrointestinal: Soft; NTND; normoactive BS  Extremities: WWP; no edema/cyanosis  Neurological:  CNII-XII grossly intact; no obvious focal deficits    MEDICATIONS  (STANDING):  dexAMETHasone     Tablet 6 milliGRAM(s) Oral daily  dextrose 40% Gel 15 Gram(s) Oral once  dextrose 5%. 1000 milliLiter(s) (50 mL/Hr) IV Continuous <Continuous>  dextrose 5%. 1000 milliLiter(s) (100 mL/Hr) IV Continuous <Continuous>  dextrose 50% Injectable 25 Gram(s) IV Push once  dextrose 50% Injectable 25 Gram(s) IV Push once  dextrose 50% Injectable 12.5 Gram(s) IV Push once  enoxaparin Injectable 40 milliGRAM(s) SubCutaneous every 24 hours  glucagon  Injectable 1 milliGRAM(s) IntraMuscular once  insulin glargine Injectable (LANTUS) 12 Unit(s) SubCutaneous at bedtime  insulin lispro (ADMELOG) corrective regimen sliding scale   SubCutaneous Before meals and at bedtime  insulin lispro Injectable (ADMELOG) 13 Unit(s) SubCutaneous three times a day before meals  pantoprazole    Tablet 40 milliGRAM(s) Oral before breakfast    MEDICATIONS  (PRN):  acetaminophen   Tablet .. 650 milliGRAM(s) Oral every 6 hours PRN Temp greater or equal to 38C (100.4F), Mild Pain (1 - 3)  guaiFENesin   Syrup  (Sugar-Free) 200 milliGRAM(s) Oral every 6 hours PRN Cough  ibuprofen  Tablet. 200 milliGRAM(s) Oral three times a day PRN Moderate Pain (4 - 6)  sodium chloride 0.65% Nasal 1 Spray(s) Both Nostrils three times a day PRN Nasal Congestion      No Known Drug Allergies  Seafood (Swelling; Rash)      LABS                        12.8   9.50  )-----------( 301      ( 03 Mar 2021 06:32 )             39.8     03-03    140  |  106  |  14  ----------------------------<  167<H>  4.2   |  24  |  0.57    Ca    8.3<L>      03 Mar 2021 06:32  Phos  3.2     03-03  Mg     2.2     03-03    TPro  6.3  /  Alb  2.3<L>  /  TBili  0.5  /  DBili  x   /  AST  58<H>  /  ALT  34  /  AlkPhos  170<H>  03-03              IMAGING/EKG/ETC  EKG:  Xray:  CT:  MRI: O/N Events: Patient placed in prone position from 11:00 PM -> 12:00 AM.   Subjective/ROS: Patient stating her breathing has improved significantly  She does not have cough, chest tightness, wheezing, pleuritic chest pain.  12pt ROS otherwise negative.    VITALS  Vital Signs Last 24 Hrs  T(C): 36.7 (03 Mar 2021 09:47), Max: 37 (03 Mar 2021 06:59)  T(F): 98.1 (03 Mar 2021 09:47), Max: 98.6 (03 Mar 2021 06:59)  HR: 68 (03 Mar 2021 08:32) (50 - 68)  BP: 90/57 (03 Mar 2021 08:00) (80/43 - 92/53)  BP(mean): 52 (03 Mar 2021 04:43) (52 - 63)  RR: 26 (03 Mar 2021 08:32) (19 - 26)  SpO2: 94% (03 Mar 2021 08:32) (91% - 96%)    CAPILLARY BLOOD GLUCOSE      POCT Blood Glucose.: 151 mg/dL (03 Mar 2021 07:14)  POCT Blood Glucose.: 245 mg/dL (02 Mar 2021 21:25)  POCT Blood Glucose.: 275 mg/dL (02 Mar 2021 16:34)  POCT Blood Glucose.: 224 mg/dL (02 Mar 2021 11:41)      PHYSICAL EXAM  General: A&Ox3; NAD, lying in bed conversing well on high flow  Head: NC/AT; MMM; PERRL; EOMI;  Neck: Supple; no JVD  Respiratory: Crackles bilaterally left worse than right  Cardiovascular: Regular rhythm/rate; S1/S2   Gastrointestinal: Soft; NTND; normoactive BS  Extremities: WWP; no edema/cyanosis  Neurological:  CNII-XII grossly intact; no obvious focal deficits    MEDICATIONS  (STANDING):  dexAMETHasone     Tablet 6 milliGRAM(s) Oral daily  dextrose 40% Gel 15 Gram(s) Oral once  dextrose 5%. 1000 milliLiter(s) (50 mL/Hr) IV Continuous <Continuous>  dextrose 5%. 1000 milliLiter(s) (100 mL/Hr) IV Continuous <Continuous>  dextrose 50% Injectable 25 Gram(s) IV Push once  dextrose 50% Injectable 25 Gram(s) IV Push once  dextrose 50% Injectable 12.5 Gram(s) IV Push once  enoxaparin Injectable 40 milliGRAM(s) SubCutaneous every 24 hours  glucagon  Injectable 1 milliGRAM(s) IntraMuscular once  insulin glargine Injectable (LANTUS) 12 Unit(s) SubCutaneous at bedtime  insulin lispro (ADMELOG) corrective regimen sliding scale   SubCutaneous Before meals and at bedtime  insulin lispro Injectable (ADMELOG) 13 Unit(s) SubCutaneous three times a day before meals  pantoprazole    Tablet 40 milliGRAM(s) Oral before breakfast    MEDICATIONS  (PRN):  acetaminophen   Tablet .. 650 milliGRAM(s) Oral every 6 hours PRN Temp greater or equal to 38C (100.4F), Mild Pain (1 - 3)  guaiFENesin   Syrup  (Sugar-Free) 200 milliGRAM(s) Oral every 6 hours PRN Cough  ibuprofen  Tablet. 200 milliGRAM(s) Oral three times a day PRN Moderate Pain (4 - 6)  sodium chloride 0.65% Nasal 1 Spray(s) Both Nostrils three times a day PRN Nasal Congestion      No Known Drug Allergies  Seafood (Swelling; Rash)      LABS                        12.8   9.50  )-----------( 301      ( 03 Mar 2021 06:32 )             39.8     03-03    140  |  106  |  14  ----------------------------<  167<H>  4.2   |  24  |  0.57    Ca    8.3<L>      03 Mar 2021 06:32  Phos  3.2     03-03  Mg     2.2     03-03    TPro  6.3  /  Alb  2.3<L>  /  TBili  0.5  /  DBili  x   /  AST  58<H>  /  ALT  34  /  AlkPhos  170<H>  03-03       O/N Events: Patient placed in prone position from 11:00 PM -> 12:00 AM.   Subjective/ROS: Patient stating her breathing has improved significantly  She does not have cough, chest tightness, wheezing, pleuritic chest pain.  12pt ROS otherwise negative.    VITALS  Vital Signs Last 24 Hrs  T(C): 36.7 (03 Mar 2021 09:47), Max: 37 (03 Mar 2021 06:59)  T(F): 98.1 (03 Mar 2021 09:47), Max: 98.6 (03 Mar 2021 06:59)  HR: 68 (03 Mar 2021 08:32) (50 - 68)  BP: 90/57 (03 Mar 2021 08:00) (80/43 - 92/53)  BP(mean): 52 (03 Mar 2021 04:43) (52 - 63)  RR: 26 (03 Mar 2021 08:32) (19 - 26)  SpO2: 94% (03 Mar 2021 08:32) (91% - 96%)    CAPILLARY BLOOD GLUCOSE      POCT Blood Glucose.: 151 mg/dL (03 Mar 2021 07:14)  POCT Blood Glucose.: 245 mg/dL (02 Mar 2021 21:25)  POCT Blood Glucose.: 275 mg/dL (02 Mar 2021 16:34)  POCT Blood Glucose.: 224 mg/dL (02 Mar 2021 11:41)      PHYSICAL EXAM  General: A&Ox3; NAD, lying in bed conversing well on high flow  Head: NC/AT; MMM; PERRL; EOMI;  Neck: Supple; no JVD  Respiratory: Crackles bilaterally left worse than right  Cardiovascular: Regular rhythm/rate; S1/S2   Gastrointestinal: Soft; NTND; normoactive BS  Extremities: WWP; no edema/cyanosis  Neurological:  CNII-XII grossly intact; no obvious focal deficits  Vasc: 2+ peripheral pulses    MEDICATIONS  (STANDING):  dexAMETHasone     Tablet 6 milliGRAM(s) Oral daily  dextrose 40% Gel 15 Gram(s) Oral once  dextrose 5%. 1000 milliLiter(s) (50 mL/Hr) IV Continuous <Continuous>  dextrose 5%. 1000 milliLiter(s) (100 mL/Hr) IV Continuous <Continuous>  dextrose 50% Injectable 25 Gram(s) IV Push once  dextrose 50% Injectable 25 Gram(s) IV Push once  dextrose 50% Injectable 12.5 Gram(s) IV Push once  enoxaparin Injectable 40 milliGRAM(s) SubCutaneous every 24 hours  glucagon  Injectable 1 milliGRAM(s) IntraMuscular once  insulin glargine Injectable (LANTUS) 12 Unit(s) SubCutaneous at bedtime  insulin lispro (ADMELOG) corrective regimen sliding scale   SubCutaneous Before meals and at bedtime  insulin lispro Injectable (ADMELOG) 13 Unit(s) SubCutaneous three times a day before meals  pantoprazole    Tablet 40 milliGRAM(s) Oral before breakfast    MEDICATIONS  (PRN):  acetaminophen   Tablet .. 650 milliGRAM(s) Oral every 6 hours PRN Temp greater or equal to 38C (100.4F), Mild Pain (1 - 3)  guaiFENesin   Syrup  (Sugar-Free) 200 milliGRAM(s) Oral every 6 hours PRN Cough  ibuprofen  Tablet. 200 milliGRAM(s) Oral three times a day PRN Moderate Pain (4 - 6)  sodium chloride 0.65% Nasal 1 Spray(s) Both Nostrils three times a day PRN Nasal Congestion      No Known Drug Allergies  Seafood (Swelling; Rash)      LABS                        12.8   9.50  )-----------( 301      ( 03 Mar 2021 06:32 )             39.8     03-03    140  |  106  |  14  ----------------------------<  167<H>  4.2   |  24  |  0.57    Ca    8.3<L>      03 Mar 2021 06:32  Phos  3.2     03-03  Mg     2.2     03-03    TPro  6.3  /  Alb  2.3<L>  /  TBili  0.5  /  DBili  x   /  AST  58<H>  /  ALT  34  /  AlkPhos  170<H>  03-03

## 2021-03-03 NOTE — PROGRESS NOTE ADULT - PROBLEM SELECTOR PLAN 5
Patient with elevated liver enzymes.  Origin at this point likely COVID, patient has not been complaining of any GI related symptoms and abdomen is benign on physical exam.   -Daily CMP  -Monitor for GI complaints

## 2021-03-03 NOTE — PROGRESS NOTE ADULT - PROBLEM SELECTOR PLAN 1
Patient testing positive for COVID PNA in the ED now requiring high flow and telemetry monitoring.   -Completed Remdesivir   -Dexamethasone till 3/5   -Daily CMP   -Wean oxygen as tolerated   -Prone patient   -Family would not like to pursue Tocilizumab at this point   -Incentive Spirometry  -Guaifenesin Syrup   -Ocean Spray

## 2021-03-03 NOTE — PROGRESS NOTE ADULT - PROBLEM SELECTOR PLAN 4
History of DM, on home Metformin 500mg qd for the last 3 years. A1c on admission 11.   -Lantus 12  -Lispro 13   -ISS   -Monitor blood sugars and will adjust insulin accordingly.

## 2021-03-03 NOTE — PROGRESS NOTE ADULT - PROBLEM SELECTOR PLAN 2
Covid management as in problem 1.     #SIRS  Resolved  On admission, meeting  2/4 SIRS criteria (HR 97, RR 24) on ED arrival, likely in the setting of COVID PNA.  - Bcx (2/24): NGTD

## 2021-03-03 NOTE — PROGRESS NOTE ADULT - PROBLEM SELECTOR PLAN 3
Patient with elevated protein gap to 5.8.  At this point cause of gap is likely secondary to COVID, other etiologies - HIV and HEP panel have been negative.   -SPEP   -UPEP

## 2021-03-03 NOTE — PROGRESS NOTE ADULT - ASSESSMENT
55 F PMHx NIDDM, cholecystectomy 2018 at [facility] presents to ED for SOB, found to be COVID postive with increased WOB and sent to [facility] tele for increased monitoring and O2 requirements. Currently s/p Remdesivir tx, on dexamethasone, and on HFNC as tolerated. PT consulted.

## 2021-03-04 LAB
ALBUMIN SERPL ELPH-MCNC: 2.4 G/DL — LOW (ref 3.3–5)
ALP SERPL-CCNC: 182 U/L — HIGH (ref 40–120)
ALT FLD-CCNC: 46 U/L — HIGH (ref 10–45)
ANION GAP SERPL CALC-SCNC: 9 MMOL/L — SIGNIFICANT CHANGE UP (ref 5–17)
AST SERPL-CCNC: 90 U/L — HIGH (ref 10–40)
BASOPHILS # BLD AUTO: 0.01 K/UL — SIGNIFICANT CHANGE UP (ref 0–0.2)
BASOPHILS NFR BLD AUTO: 0.1 % — SIGNIFICANT CHANGE UP (ref 0–2)
BILIRUB SERPL-MCNC: 0.6 MG/DL — SIGNIFICANT CHANGE UP (ref 0.2–1.2)
BUN SERPL-MCNC: 15 MG/DL — SIGNIFICANT CHANGE UP (ref 7–23)
CALCIUM SERPL-MCNC: 8 MG/DL — LOW (ref 8.4–10.5)
CHLORIDE SERPL-SCNC: 103 MMOL/L — SIGNIFICANT CHANGE UP (ref 96–108)
CO2 SERPL-SCNC: 25 MMOL/L — SIGNIFICANT CHANGE UP (ref 22–31)
CREAT SERPL-MCNC: 0.58 MG/DL — SIGNIFICANT CHANGE UP (ref 0.5–1.3)
EOSINOPHIL # BLD AUTO: 0 K/UL — SIGNIFICANT CHANGE UP (ref 0–0.5)
EOSINOPHIL NFR BLD AUTO: 0 % — SIGNIFICANT CHANGE UP (ref 0–6)
GLUCOSE BLDC GLUCOMTR-MCNC: 149 MG/DL — HIGH (ref 70–99)
GLUCOSE BLDC GLUCOMTR-MCNC: 150 MG/DL — HIGH (ref 70–99)
GLUCOSE BLDC GLUCOMTR-MCNC: 174 MG/DL — HIGH (ref 70–99)
GLUCOSE BLDC GLUCOMTR-MCNC: 284 MG/DL — HIGH (ref 70–99)
GLUCOSE SERPL-MCNC: 158 MG/DL — HIGH (ref 70–99)
HCT VFR BLD CALC: 39.6 % — SIGNIFICANT CHANGE UP (ref 34.5–45)
HGB BLD-MCNC: 12.8 G/DL — SIGNIFICANT CHANGE UP (ref 11.5–15.5)
IMM GRANULOCYTES NFR BLD AUTO: 0.8 % — SIGNIFICANT CHANGE UP (ref 0–1.5)
LYMPHOCYTES # BLD AUTO: 2.47 K/UL — SIGNIFICANT CHANGE UP (ref 1–3.3)
LYMPHOCYTES # BLD AUTO: 23.5 % — SIGNIFICANT CHANGE UP (ref 13–44)
MAGNESIUM SERPL-MCNC: 2 MG/DL — SIGNIFICANT CHANGE UP (ref 1.6–2.6)
MCHC RBC-ENTMCNC: 28.7 PG — SIGNIFICANT CHANGE UP (ref 27–34)
MCHC RBC-ENTMCNC: 32.3 GM/DL — SIGNIFICANT CHANGE UP (ref 32–36)
MCV RBC AUTO: 88.8 FL — SIGNIFICANT CHANGE UP (ref 80–100)
MONOCYTES # BLD AUTO: 0.9 K/UL — SIGNIFICANT CHANGE UP (ref 0–0.9)
MONOCYTES NFR BLD AUTO: 8.6 % — SIGNIFICANT CHANGE UP (ref 2–14)
NEUTROPHILS # BLD AUTO: 7.06 K/UL — SIGNIFICANT CHANGE UP (ref 1.8–7.4)
NEUTROPHILS NFR BLD AUTO: 67 % — SIGNIFICANT CHANGE UP (ref 43–77)
NRBC # BLD: 0 /100 WBCS — SIGNIFICANT CHANGE UP (ref 0–0)
PHOSPHATE SERPL-MCNC: 2.9 MG/DL — SIGNIFICANT CHANGE UP (ref 2.5–4.5)
PLATELET # BLD AUTO: 273 K/UL — SIGNIFICANT CHANGE UP (ref 150–400)
POTASSIUM SERPL-MCNC: 3.8 MMOL/L — SIGNIFICANT CHANGE UP (ref 3.5–5.3)
POTASSIUM SERPL-SCNC: 3.8 MMOL/L — SIGNIFICANT CHANGE UP (ref 3.5–5.3)
PROT SERPL-MCNC: 6 G/DL — SIGNIFICANT CHANGE UP (ref 6–8.3)
PROT SERPL-MCNC: 6 G/DL — SIGNIFICANT CHANGE UP (ref 6–8.3)
PROT SERPL-MCNC: 6.4 G/DL — SIGNIFICANT CHANGE UP (ref 6–8.3)
RBC # BLD: 4.46 M/UL — SIGNIFICANT CHANGE UP (ref 3.8–5.2)
RBC # FLD: 14.5 % — SIGNIFICANT CHANGE UP (ref 10.3–14.5)
SODIUM SERPL-SCNC: 137 MMOL/L — SIGNIFICANT CHANGE UP (ref 135–145)
WBC # BLD: 10.52 K/UL — HIGH (ref 3.8–10.5)
WBC # FLD AUTO: 10.52 K/UL — HIGH (ref 3.8–10.5)

## 2021-03-04 PROCEDURE — 99233 SBSQ HOSP IP/OBS HIGH 50: CPT | Mod: GC

## 2021-03-04 RX ORDER — POTASSIUM PHOSPHATE, MONOBASIC POTASSIUM PHOSPHATE, DIBASIC 236; 224 MG/ML; MG/ML
15 INJECTION, SOLUTION INTRAVENOUS ONCE
Refills: 0 | Status: COMPLETED | OUTPATIENT
Start: 2021-03-04 | End: 2021-03-04

## 2021-03-04 RX ADMIN — Medication 6: at 22:16

## 2021-03-04 RX ADMIN — INSULIN GLARGINE 18 UNIT(S): 100 INJECTION, SOLUTION SUBCUTANEOUS at 22:16

## 2021-03-04 RX ADMIN — POTASSIUM PHOSPHATE, MONOBASIC POTASSIUM PHOSPHATE, DIBASIC 62.5 MILLIMOLE(S): 236; 224 INJECTION, SOLUTION INTRAVENOUS at 12:03

## 2021-03-04 RX ADMIN — Medication 6 MILLIGRAM(S): at 07:11

## 2021-03-04 RX ADMIN — Medication 13 UNIT(S): at 07:11

## 2021-03-04 RX ADMIN — Medication 13 UNIT(S): at 16:52

## 2021-03-04 RX ADMIN — ENOXAPARIN SODIUM 40 MILLIGRAM(S): 100 INJECTION SUBCUTANEOUS at 17:04

## 2021-03-04 RX ADMIN — Medication 13 UNIT(S): at 11:53

## 2021-03-04 RX ADMIN — PANTOPRAZOLE SODIUM 40 MILLIGRAM(S): 20 TABLET, DELAYED RELEASE ORAL at 07:11

## 2021-03-04 RX ADMIN — Medication 2: at 11:53

## 2021-03-04 NOTE — PROGRESS NOTE ADULT - PROBLEM SELECTOR PLAN 1
Patient testing positive for COVID PNA in the ED requiring high flow and telemetry monitoring. Patient has been symptomatically improving and requiring decreasing amounts of supplemental oxygen   -Wean down oxygen to Nasal Canula, if cannot tolerate will restart High Flow   -Completed Remdesivir   -Dexamethasone till 3/5   -Daily CMP   -Wean oxygen as tolerated   -Prone patient   -Family would not like to pursue Tocilizumab at this point   -Incentive Spirometry  -Guaifenesin Syrup   -Ocean Spray Patient testing positive for COVID PNA in the ED requiring high flow and telemetry monitoring. Patient has been symptomatically improving and requiring decreasing amounts of supplemental oxygen   -Wean down oxygen to Nasal Canula 6 lpm, if cannot tolerate will restart High Flow   -Completed Remdesivir   -Dexamethasone till 3/5   -Daily CMP   -Wean oxygen as tolerated   -Prone patient   -Family would not like to pursue Tocilizumab at this point   -Incentive Spirometry  -Guaifenesin Syrup   -Ocean Spray

## 2021-03-04 NOTE — PROGRESS NOTE ADULT - PROBLEM SELECTOR PLAN 4
History of DM, on home Metformin 500mg qd for the last 3 years. A1c on admission 11. Blood sugar levels being worsened by steroids. Will readjust insulin regimen when steroid course is completed, and if remains deranged during treatment.    -Lantus 18  -Lispro 13   -ISS

## 2021-03-04 NOTE — PROGRESS NOTE ADULT - ASSESSMENT
55 F PMHx NIDDM, cholecystectomy 2018 at [facility] presents to ED for SOB, found to be COVID postive with increased WOB and sent to 2[facility] tele for increased monitoring and O2 requirements. Currently s/p Remdesivir tx, on dexamethasone, and on HFNC as tolerated. PT consulted.

## 2021-03-04 NOTE — PROGRESS NOTE ADULT - SUBJECTIVE AND OBJECTIVE BOX
O/N Events: Patient went into prone position for one hour.   Subjective/ROS: Patient states she is doing better and that her breathing has improved. She denies any cough, productive sputum, chest tightness, wheezing, fever, night sweats, or chills. 12pt ROS otherwise negative.    VITALS  Vital Signs Last 24 Hrs  T(C): 36.6 (04 Mar 2021 08:54), Max: 36.9 (03 Mar 2021 17:15)  T(F): 97.9 (04 Mar 2021 08:54), Max: 98.5 (03 Mar 2021 17:15)  HR: 52 (04 Mar 2021 11:09) (51 - 65)  BP: 88/52 (04 Mar 2021 04:31) (87/50 - 94/52)  BP(mean): 60 (04 Mar 2021 04:31) (55 - 60)  RR: 17 (04 Mar 2021 11:09) (17 - 28)  SpO2: 95% (04 Mar 2021 11:09) (89% - 95%)    CAPILLARY BLOOD GLUCOSE      POCT Blood Glucose.: 174 mg/dL (04 Mar 2021 11:51)  POCT Blood Glucose.: 150 mg/dL (04 Mar 2021 06:32)  POCT Blood Glucose.: 227 mg/dL (03 Mar 2021 21:57)  POCT Blood Glucose.: 255 mg/dL (03 Mar 2021 16:11)      PHYSICAL EXAM  General: A&Ox3; NAD, lying supine in bed, able to converse well on high flow.  Head: NC/AT; MMM; PERRL; EOMI;  Neck: Supple; no JVD  Respiratory: Crackles in right lower lung field   Cardiovascular: Regular rhythm/rate; S1/S2   Gastrointestinal: Soft; NTND; normoactive BS  Extremities: WWP; no edema/cyanosis      MEDICATIONS  (STANDING):  dexAMETHasone     Tablet 6 milliGRAM(s) Oral daily  dextrose 40% Gel 15 Gram(s) Oral once  dextrose 5%. 1000 milliLiter(s) (50 mL/Hr) IV Continuous <Continuous>  dextrose 5%. 1000 milliLiter(s) (100 mL/Hr) IV Continuous <Continuous>  dextrose 50% Injectable 25 Gram(s) IV Push once  dextrose 50% Injectable 12.5 Gram(s) IV Push once  dextrose 50% Injectable 25 Gram(s) IV Push once  enoxaparin Injectable 40 milliGRAM(s) SubCutaneous every 24 hours  glucagon  Injectable 1 milliGRAM(s) IntraMuscular once  insulin glargine Injectable (LANTUS) 18 Unit(s) SubCutaneous at bedtime  insulin lispro (ADMELOG) corrective regimen sliding scale   SubCutaneous Before meals and at bedtime  insulin lispro Injectable (ADMELOG) 13 Unit(s) SubCutaneous three times a day before meals  pantoprazole    Tablet 40 milliGRAM(s) Oral before breakfast    MEDICATIONS  (PRN):  acetaminophen   Tablet .. 650 milliGRAM(s) Oral every 6 hours PRN Temp greater or equal to 38C (100.4F), Mild Pain (1 - 3)  guaiFENesin   Syrup  (Sugar-Free) 200 milliGRAM(s) Oral every 6 hours PRN Cough  ibuprofen  Tablet. 200 milliGRAM(s) Oral three times a day PRN Moderate Pain (4 - 6)  sodium chloride 0.65% Nasal 1 Spray(s) Both Nostrils three times a day PRN Nasal Congestion      No Known Drug Allergies  Seafood (Swelling; Rash)      LABS                        12.8   10.52 )-----------( 273      ( 04 Mar 2021 07:35 )             39.6     03-04    137  |  103  |  15  ----------------------------<  158<H>  3.8   |  25  |  0.58    Ca    8.0<L>      04 Mar 2021 07:35  Phos  2.9     03-04  Mg     2.0     03-04    TPro  6.4  /  Alb  2.4<L>  /  TBili  0.6  /  DBili  x   /  AST  90<H>  /  ALT  46<H>  /  AlkPhos  182<H>  03-04               O/N Events: Patient went into prone position for one hour.   Subjective/ROS: Patient states she is doing better and that her breathing has improved. She denies any cough, productive sputum, chest tightness, wheezing, fever, night sweats, or chills. 12pt ROS otherwise negative.    VITALS  Vital Signs Last 24 Hrs  T(C): 36.6 (04 Mar 2021 08:54), Max: 36.9 (03 Mar 2021 17:15)  T(F): 97.9 (04 Mar 2021 08:54), Max: 98.5 (03 Mar 2021 17:15)  HR: 52 (04 Mar 2021 11:09) (51 - 65)  BP: 88/52 (04 Mar 2021 04:31) (87/50 - 94/52)  BP(mean): 60 (04 Mar 2021 04:31) (55 - 60)  RR: 17 (04 Mar 2021 11:09) (17 - 28)  SpO2: 95% (04 Mar 2021 11:09) (89% - 95%)    CAPILLARY BLOOD GLUCOSE      POCT Blood Glucose.: 174 mg/dL (04 Mar 2021 11:51)  POCT Blood Glucose.: 150 mg/dL (04 Mar 2021 06:32)  POCT Blood Glucose.: 227 mg/dL (03 Mar 2021 21:57)  POCT Blood Glucose.: 255 mg/dL (03 Mar 2021 16:11)      PHYSICAL EXAM  General: A&Ox3; NAD, lying supine in bed, able to converse well on high flow.  Head: NC/AT; MMM; PERRL; EOMI;  Neck: Supple; no JVD  Respiratory: Crackles in right lower lung field   Cardiovascular: Regular rhythm/rate; S1/S2   Gastrointestinal: Soft; NTND; normoactive BS  Extremities: WWP; no edema/cyanosis  Vasc: 2+ pulses  Skin: no rash      MEDICATIONS  (STANDING):  dexAMETHasone     Tablet 6 milliGRAM(s) Oral daily  dextrose 40% Gel 15 Gram(s) Oral once  dextrose 5%. 1000 milliLiter(s) (50 mL/Hr) IV Continuous <Continuous>  dextrose 5%. 1000 milliLiter(s) (100 mL/Hr) IV Continuous <Continuous>  dextrose 50% Injectable 25 Gram(s) IV Push once  dextrose 50% Injectable 12.5 Gram(s) IV Push once  dextrose 50% Injectable 25 Gram(s) IV Push once  enoxaparin Injectable 40 milliGRAM(s) SubCutaneous every 24 hours  glucagon  Injectable 1 milliGRAM(s) IntraMuscular once  insulin glargine Injectable (LANTUS) 18 Unit(s) SubCutaneous at bedtime  insulin lispro (ADMELOG) corrective regimen sliding scale   SubCutaneous Before meals and at bedtime  insulin lispro Injectable (ADMELOG) 13 Unit(s) SubCutaneous three times a day before meals  pantoprazole    Tablet 40 milliGRAM(s) Oral before breakfast    MEDICATIONS  (PRN):  acetaminophen   Tablet .. 650 milliGRAM(s) Oral every 6 hours PRN Temp greater or equal to 38C (100.4F), Mild Pain (1 - 3)  guaiFENesin   Syrup  (Sugar-Free) 200 milliGRAM(s) Oral every 6 hours PRN Cough  ibuprofen  Tablet. 200 milliGRAM(s) Oral three times a day PRN Moderate Pain (4 - 6)  sodium chloride 0.65% Nasal 1 Spray(s) Both Nostrils three times a day PRN Nasal Congestion      No Known Drug Allergies  Seafood (Swelling; Rash)      LABS                        12.8   10.52 )-----------( 273      ( 04 Mar 2021 07:35 )             39.6     03-04    137  |  103  |  15  ----------------------------<  158<H>  3.8   |  25  |  0.58    Ca    8.0<L>      04 Mar 2021 07:35  Phos  2.9     03-04  Mg     2.0     03-04    TPro  6.4  /  Alb  2.4<L>  /  TBili  0.6  /  DBili  x   /  AST  90<H>  /  ALT  46<H>  /  AlkPhos  182<H>  03-04

## 2021-03-04 NOTE — PROGRESS NOTE ADULT - PROBLEM SELECTOR PLAN 3
Patient with elevated protein gap to 5.8.  At this point cause of gap is likely secondary to COVID, other etiologies - HIV and HEP panel have been negative.   -f/u SPEP   -f/u UPEP

## 2021-03-05 ENCOUNTER — TRANSCRIPTION ENCOUNTER (OUTPATIENT)
Age: 56
End: 2021-03-05

## 2021-03-05 LAB
ALBUMIN SERPL ELPH-MCNC: 2.5 G/DL — LOW (ref 3.3–5)
ALP SERPL-CCNC: 202 U/L — HIGH (ref 40–120)
ALT FLD-CCNC: 78 U/L — HIGH (ref 10–45)
ANION GAP SERPL CALC-SCNC: 14 MMOL/L — SIGNIFICANT CHANGE UP (ref 5–17)
AST SERPL-CCNC: 155 U/L — HIGH (ref 10–40)
BASOPHILS # BLD AUTO: 0.02 K/UL — SIGNIFICANT CHANGE UP (ref 0–0.2)
BASOPHILS NFR BLD AUTO: 0.2 % — SIGNIFICANT CHANGE UP (ref 0–2)
BILIRUB SERPL-MCNC: 0.7 MG/DL — SIGNIFICANT CHANGE UP (ref 0.2–1.2)
BUN SERPL-MCNC: 14 MG/DL — SIGNIFICANT CHANGE UP (ref 7–23)
CALCIUM SERPL-MCNC: 8 MG/DL — LOW (ref 8.4–10.5)
CHLORIDE SERPL-SCNC: 100 MMOL/L — SIGNIFICANT CHANGE UP (ref 96–108)
CO2 SERPL-SCNC: 23 MMOL/L — SIGNIFICANT CHANGE UP (ref 22–31)
CREAT SERPL-MCNC: 0.49 MG/DL — LOW (ref 0.5–1.3)
EOSINOPHIL # BLD AUTO: 0 K/UL — SIGNIFICANT CHANGE UP (ref 0–0.5)
EOSINOPHIL NFR BLD AUTO: 0 % — SIGNIFICANT CHANGE UP (ref 0–6)
GLUCOSE BLDC GLUCOMTR-MCNC: 121 MG/DL — HIGH (ref 70–99)
GLUCOSE BLDC GLUCOMTR-MCNC: 190 MG/DL — HIGH (ref 70–99)
GLUCOSE BLDC GLUCOMTR-MCNC: 228 MG/DL — HIGH (ref 70–99)
GLUCOSE BLDC GLUCOMTR-MCNC: 239 MG/DL — HIGH (ref 70–99)
GLUCOSE SERPL-MCNC: 205 MG/DL — HIGH (ref 70–99)
HCT VFR BLD CALC: 42.2 % — SIGNIFICANT CHANGE UP (ref 34.5–45)
HGB BLD-MCNC: 13.9 G/DL — SIGNIFICANT CHANGE UP (ref 11.5–15.5)
IMM GRANULOCYTES NFR BLD AUTO: 0.8 % — SIGNIFICANT CHANGE UP (ref 0–1.5)
LYMPHOCYTES # BLD AUTO: 2.88 K/UL — SIGNIFICANT CHANGE UP (ref 1–3.3)
LYMPHOCYTES # BLD AUTO: 27.7 % — SIGNIFICANT CHANGE UP (ref 13–44)
MAGNESIUM SERPL-MCNC: 1.9 MG/DL — SIGNIFICANT CHANGE UP (ref 1.6–2.6)
MCHC RBC-ENTMCNC: 28.8 PG — SIGNIFICANT CHANGE UP (ref 27–34)
MCHC RBC-ENTMCNC: 32.9 GM/DL — SIGNIFICANT CHANGE UP (ref 32–36)
MCV RBC AUTO: 87.6 FL — SIGNIFICANT CHANGE UP (ref 80–100)
MONOCYTES # BLD AUTO: 0.7 K/UL — SIGNIFICANT CHANGE UP (ref 0–0.9)
MONOCYTES NFR BLD AUTO: 6.7 % — SIGNIFICANT CHANGE UP (ref 2–14)
NEUTROPHILS # BLD AUTO: 6.71 K/UL — SIGNIFICANT CHANGE UP (ref 1.8–7.4)
NEUTROPHILS NFR BLD AUTO: 64.6 % — SIGNIFICANT CHANGE UP (ref 43–77)
NRBC # BLD: 0 /100 WBCS — SIGNIFICANT CHANGE UP (ref 0–0)
PHOSPHATE SERPL-MCNC: 2.5 MG/DL — SIGNIFICANT CHANGE UP (ref 2.5–4.5)
PLATELET # BLD AUTO: 285 K/UL — SIGNIFICANT CHANGE UP (ref 150–400)
POTASSIUM SERPL-MCNC: 3.7 MMOL/L — SIGNIFICANT CHANGE UP (ref 3.5–5.3)
POTASSIUM SERPL-SCNC: 3.7 MMOL/L — SIGNIFICANT CHANGE UP (ref 3.5–5.3)
PROT SERPL-MCNC: 6.5 G/DL — SIGNIFICANT CHANGE UP (ref 6–8.3)
RBC # BLD: 4.82 M/UL — SIGNIFICANT CHANGE UP (ref 3.8–5.2)
RBC # FLD: 14.6 % — HIGH (ref 10.3–14.5)
SARS-COV-2 RNA SPEC QL NAA+PROBE: DETECTED
SODIUM SERPL-SCNC: 137 MMOL/L — SIGNIFICANT CHANGE UP (ref 135–145)
WBC # BLD: 10.39 K/UL — SIGNIFICANT CHANGE UP (ref 3.8–10.5)
WBC # FLD AUTO: 10.39 K/UL — SIGNIFICANT CHANGE UP (ref 3.8–10.5)

## 2021-03-05 PROCEDURE — 76705 ECHO EXAM OF ABDOMEN: CPT | Mod: 26

## 2021-03-05 PROCEDURE — 99233 SBSQ HOSP IP/OBS HIGH 50: CPT | Mod: GC

## 2021-03-05 RX ORDER — POTASSIUM CHLORIDE 20 MEQ
20 PACKET (EA) ORAL ONCE
Refills: 0 | Status: COMPLETED | OUTPATIENT
Start: 2021-03-05 | End: 2021-03-05

## 2021-03-05 RX ORDER — ENOXAPARIN SODIUM 100 MG/ML
15 INJECTION SUBCUTANEOUS
Qty: 5 | Refills: 0
Start: 2021-03-05 | End: 2021-04-03

## 2021-03-05 RX ORDER — METFORMIN HYDROCHLORIDE 850 MG/1
1 TABLET ORAL
Qty: 0 | Refills: 0 | DISCHARGE

## 2021-03-05 RX ORDER — ISOPROPYL ALCOHOL, BENZOCAINE .7; .06 ML/ML; ML/ML
1 SWAB TOPICAL
Qty: 120 | Refills: 1
Start: 2021-03-05 | End: 2021-05-03

## 2021-03-05 RX ORDER — INSULIN LISPRO 100/ML
8 VIAL (ML) SUBCUTANEOUS
Refills: 0 | Status: DISCONTINUED | OUTPATIENT
Start: 2021-03-05 | End: 2021-03-06

## 2021-03-05 RX ORDER — INSULIN GLARGINE 100 [IU]/ML
15 INJECTION, SOLUTION SUBCUTANEOUS AT BEDTIME
Refills: 0 | Status: DISCONTINUED | OUTPATIENT
Start: 2021-03-05 | End: 2021-03-06

## 2021-03-05 RX ORDER — INSULIN LISPRO 100/ML
5 VIAL (ML) SUBCUTANEOUS
Qty: 6 | Refills: 0
Start: 2021-03-05 | End: 2021-04-03

## 2021-03-05 RX ORDER — INSULIN GLARGINE 100 [IU]/ML
15 INJECTION, SOLUTION SUBCUTANEOUS
Qty: 6 | Refills: 0
Start: 2021-03-05 | End: 2021-04-03

## 2021-03-05 RX ADMIN — Medication 2: at 11:42

## 2021-03-05 RX ADMIN — INSULIN GLARGINE 15 UNIT(S): 100 INJECTION, SOLUTION SUBCUTANEOUS at 22:04

## 2021-03-05 RX ADMIN — Medication 13 UNIT(S): at 16:37

## 2021-03-05 RX ADMIN — ENOXAPARIN SODIUM 40 MILLIGRAM(S): 100 INJECTION SUBCUTANEOUS at 19:12

## 2021-03-05 RX ADMIN — Medication 4: at 22:03

## 2021-03-05 RX ADMIN — Medication 4: at 16:36

## 2021-03-05 RX ADMIN — Medication 13 UNIT(S): at 11:42

## 2021-03-05 RX ADMIN — PANTOPRAZOLE SODIUM 40 MILLIGRAM(S): 20 TABLET, DELAYED RELEASE ORAL at 05:42

## 2021-03-05 RX ADMIN — Medication 20 MILLIEQUIVALENT(S): at 16:36

## 2021-03-05 RX ADMIN — Medication 6 MILLIGRAM(S): at 05:45

## 2021-03-05 RX ADMIN — Medication 13 UNIT(S): at 07:10

## 2021-03-05 NOTE — PROGRESS NOTE ADULT - PROBLEM SELECTOR PLAN 1
Patient testing positive for COVID PNA in the ED requiring high flow and telemetry monitoring. Patient has been symptomatically improving and requiring decreasing amounts of supplemental oxygen   -Wean down oxygen if tolerable currently on a 3 LPM  -Completed Remdesivir   -Completed Dexamethasone on 3/5  -Daily CMP   -Prone patient   -Family would not like to pursue Tocilizumab at this point   -Incentive Spirometry  -Ocean Spray

## 2021-03-05 NOTE — PROGRESS NOTE ADULT - SUBJECTIVE AND OBJECTIVE BOX
Transfer from 2  to [facility] Course: Patient is a 56 yo F with a PMH of NIDDM (on Metformin 500mg qd A1c of 11.3) who presents to ED with SOB and chest pain x 3 days. Patient reports that she has been experiencing shortness of breath, progressively worsening with exertion, associated with R upper sternal chest pain, non-radiating, worse with inspiration testing positive for COVID of Feb 24.  Completed 5d Remdesivir course and last day of  Decadron is 3/5. Patient initially requiring HFNC titrating upwards to 40/70%.  Pt with elevated D-Dimers however Duplex U/S is negative. During her stay on 2WO patient had improved symptomatically with decreased shortness of breath and no longer experiencing chest pain.  Patient has been weaned down to 3L NC.  Pt course complicated by uncontrolled blood sugars, currently patient on 18 Lantus and 13 Lispro pre-meal  Patient will completed her course of decadron today and will likely need insulin adjusted tomorrow. During admission patient has elevating ALK phos, AST, and ALT     O/N Events: Placed in prone position for one hour.   Subjective/ROS: Patient today stating she is feeling well, and that her breathing has improved.  She denies cough, pleuritic chest pain, sputum production, chest tightness wheezing, fever, night sweats, or chills.     VITALS  Vital Signs Last 24 Hrs  T(C): 36.2 (05 Mar 2021 08:13), Max: 37.1 (04 Mar 2021 20:00)  T(F): 97.2 (05 Mar 2021 08:13), Max: 98.8 (04 Mar 2021 20:00)  HR: 86 (05 Mar 2021 08:26) (53 - 86)  BP: 85/55 (05 Mar 2021 08:26) (83/49 - 99/50)  BP(mean): 59 (05 Mar 2021 04:39) (59 - 59)  RR: 26 (05 Mar 2021 08:26) (18 - 26)  SpO2: 93% (05 Mar 2021 08:26) (92% - 97%)    CAPILLARY BLOOD GLUCOSE      POCT Blood Glucose.: 190 mg/dL (05 Mar 2021 11:09)  POCT Blood Glucose.: 121 mg/dL (05 Mar 2021 06:40)  POCT Blood Glucose.: 284 mg/dL (04 Mar 2021 22:09)  POCT Blood Glucose.: 149 mg/dL (04 Mar 2021 16:21)  POCT Blood Glucose.: 174 mg/dL (04 Mar 2021 11:51)      PHYSICAL EXAM  General: A&Ox3; NAD, Able to converse well on 3L of oxygen with no accessory muscle use   Head: NC/AT; MMM; PERRL; EOMI;  Neck: Supple; no JVD  Respiratory: Reduced breath sounds bilaterally   Cardiovascular: Regular rhythm/rate; S1/S2   Gastrointestinal: Soft; NTND; normoactive BS  Extremities: WWP; no edema/cyanosis  Neurological:  CNII-XII grossly intact; no obvious focal deficits    MEDICATIONS  (STANDING):  dextrose 40% Gel 15 Gram(s) Oral once  dextrose 5%. 1000 milliLiter(s) (50 mL/Hr) IV Continuous <Continuous>  dextrose 5%. 1000 milliLiter(s) (100 mL/Hr) IV Continuous <Continuous>  dextrose 50% Injectable 25 Gram(s) IV Push once  dextrose 50% Injectable 12.5 Gram(s) IV Push once  dextrose 50% Injectable 25 Gram(s) IV Push once  enoxaparin Injectable 40 milliGRAM(s) SubCutaneous every 24 hours  glucagon  Injectable 1 milliGRAM(s) IntraMuscular once  insulin glargine Injectable (LANTUS) 18 Unit(s) SubCutaneous at bedtime  insulin lispro (ADMELOG) corrective regimen sliding scale   SubCutaneous Before meals and at bedtime  insulin lispro Injectable (ADMELOG) 13 Unit(s) SubCutaneous three times a day before meals    MEDICATIONS  (PRN):  acetaminophen   Tablet .. 650 milliGRAM(s) Oral every 6 hours PRN Temp greater or equal to 38C (100.4F), Mild Pain (1 - 3)  sodium chloride 0.65% Nasal 1 Spray(s) Both Nostrils three times a day PRN Nasal Congestion      No Known Drug Allergies  Seafood (Swelling; Rash)      LABS                        13.9   10.39 )-----------( 285      ( 05 Mar 2021 09:01 )             42.2     03-05    137  |  100  |  14  ----------------------------<  205<H>  3.7   |  23  |  0.49<L>    Ca    8.0<L>      05 Mar 2021 09:01  Phos  2.5     03-05  Mg     1.9     03-05    TPro  6.5  /  Alb  2.5<L>  /  TBili  0.7  /  DBili  x   /  AST  155<H>  /  ALT  78<H>  /  AlkPhos  202<H>  03-05           Transfer from 2 WO to [facility] Course: Patient is a 54 yo F with a PMH of NIDDM (on Metformin 500mg qd A1c of 11.3) who presents to ED with SOB and chest pain x 3 days. Patient reports that she has been experiencing shortness of breath, progressively worsening with exertion, associated with R upper sternal chest pain, non-radiating, worse with inspiration. Patient with negative troponin levels and testing positive for COVID on Feb 24.  She completed 5d Remdesivir course and last day of  Decadron is 3/5. Patient initially requiring HFNC titrating upwards to 40/70%.  Pt with elevated D-Dimers however Duplex U/S is negative. During her stay on 2[facility] patient had improved symptomatically with decreased shortness of breath and no longer experienced chest pain.  Patient has been weaned down to 3L NC.  Pt course complicated by elevated blood sugars, currently on 18 Lantus and 13 Lispro pre-meal. Blood glucose levels are now better controlled.  Patient completed her course of decadron today and will likely need insulin adjustment . During admission patient had elevated AlK phos, AST, and ALT. Patient has not complained of abdominal symptoms during admission, and has benign abdominal exam. However due to constant elevation abdominal ultrasound ordered to today. Further protein gap was noted on lab work, hepatitis panel, HIV have test have returned negative.  Still pending results of SPEP and UPEP.  Patient is stable to be stepped down to [facility].     O/N Events: Placed in prone position for one hour.   Subjective/ROS: Patient today stating she is feeling well, and that her breathing has improved.  She denies cough, pleuritic chest pain, sputum production, chest tightness wheezing, fever, night sweats, or chills.     VITALS  Vital Signs Last 24 Hrs  T(C): 36.2 (05 Mar 2021 08:13), Max: 37.1 (04 Mar 2021 20:00)  T(F): 97.2 (05 Mar 2021 08:13), Max: 98.8 (04 Mar 2021 20:00)  HR: 86 (05 Mar 2021 08:26) (53 - 86)  BP: 85/55 (05 Mar 2021 08:26) (83/49 - 99/50)  BP(mean): 59 (05 Mar 2021 04:39) (59 - 59)  RR: 26 (05 Mar 2021 08:26) (18 - 26)  SpO2: 93% (05 Mar 2021 08:26) (92% - 97%)    CAPILLARY BLOOD GLUCOSE      POCT Blood Glucose.: 190 mg/dL (05 Mar 2021 11:09)  POCT Blood Glucose.: 121 mg/dL (05 Mar 2021 06:40)  POCT Blood Glucose.: 284 mg/dL (04 Mar 2021 22:09)  POCT Blood Glucose.: 149 mg/dL (04 Mar 2021 16:21)  POCT Blood Glucose.: 174 mg/dL (04 Mar 2021 11:51)      PHYSICAL EXAM  General: A&Ox3; NAD, Able to converse well on 3L of oxygen with no accessory muscle use   Head: NC/AT; MMM; PERRL; EOMI;  Neck: Supple; no JVD  Respiratory: Reduced breath sounds bilaterally   Cardiovascular: Regular rhythm/rate; S1/S2   Gastrointestinal: Soft; NTND; normoactive BS  Extremities: WWP; no edema/cyanosis  Neurological:  CNII-XII grossly intact; no obvious focal deficits    MEDICATIONS  (STANDING):  dextrose 40% Gel 15 Gram(s) Oral once  dextrose 5%. 1000 milliLiter(s) (50 mL/Hr) IV Continuous <Continuous>  dextrose 5%. 1000 milliLiter(s) (100 mL/Hr) IV Continuous <Continuous>  dextrose 50% Injectable 25 Gram(s) IV Push once  dextrose 50% Injectable 12.5 Gram(s) IV Push once  dextrose 50% Injectable 25 Gram(s) IV Push once  enoxaparin Injectable 40 milliGRAM(s) SubCutaneous every 24 hours  glucagon  Injectable 1 milliGRAM(s) IntraMuscular once  insulin glargine Injectable (LANTUS) 18 Unit(s) SubCutaneous at bedtime  insulin lispro (ADMELOG) corrective regimen sliding scale   SubCutaneous Before meals and at bedtime  insulin lispro Injectable (ADMELOG) 13 Unit(s) SubCutaneous three times a day before meals    MEDICATIONS  (PRN):  acetaminophen   Tablet .. 650 milliGRAM(s) Oral every 6 hours PRN Temp greater or equal to 38C (100.4F), Mild Pain (1 - 3)  sodium chloride 0.65% Nasal 1 Spray(s) Both Nostrils three times a day PRN Nasal Congestion      No Known Drug Allergies  Seafood (Swelling; Rash)      LABS                        13.9   10.39 )-----------( 285      ( 05 Mar 2021 09:01 )             42.2     03-05    137  |  100  |  14  ----------------------------<  205<H>  3.7   |  23  |  0.49<L>    Ca    8.0<L>      05 Mar 2021 09:01  Phos  2.5     03-05  Mg     1.9     03-05    TPro  6.5  /  Alb  2.5<L>  /  TBili  0.7  /  DBili  x   /  AST  155<H>  /  ALT  78<H>  /  AlkPhos  202<H>  03-05

## 2021-03-05 NOTE — DISCHARGE NOTE PROVIDER - CARE PROVIDER_API CALL
Javier Carpenter  [address], [place] [address]  Phone: [phone]  Fax: (   )    -  Scheduled Appointment: 03/08/2021 12:00 AM    [facility],   [address] [place] [address]  Phone: [phone]  Fax: (   )    -  Follow Up Time:    Javier Carpenter  [address], [place] [address]  Phone: [phone]  Fax: (   )    -  Scheduled Appointment: 03/08/2021 09:45 AM    [facility],   [address] [place] [address]  Phone: [phone]  Fax: (   )    -  Follow Up Time:     Bryan Tam  [facility] [address] [place] [address]  Phone: [phone]  Fax: [phone]  Follow Up Time: 2 weeks

## 2021-03-05 NOTE — DISCHARGE NOTE PROVIDER - NSDCFUADDAPPT_GEN_ALL_CORE_FT
Please reach out to the [facility] [place] for further management of your diabetes.  Please reach out to the [facility] [place] for further management of your diabetes.     We tried calling Dr. Tam's office to set up a follow up appointment however we were unable to reach him.  Please call his office to schedule a follow up appointment.

## 2021-03-05 NOTE — PROGRESS NOTE ADULT - PROBLEM SELECTOR PLAN 5
Patient with elevated liver enzymes.  Origin at this point likely COVID, patient has not been complaining of any GI related symptoms and abdomen is benign on physical exam.   -Daily CMP  -Monitor for GI complaints Patient with elevated liver enzymes.  Origin at this point likely COVID, patient has not been complaining of any GI related symptoms and abdomen is benign on physical exam.   -Abdominal Ultrasound Ordered  -Daily CMP  -Monitor for GI complaints

## 2021-03-05 NOTE — DISCHARGE NOTE PROVIDER - PROVIDER TOKENS
FREE:[LAST:[Byagari],FIRST:[Javier],PHONE:[phone]],FAX:[(   )    -],ADDRESS:[[address] [place] [address] #[place] [address]],SCHEDULEDAPPT:[03/08/2021],SCHEDULEDAPPTTIME:[12:00 AM]],FREE:[LAST:[[facility]],PHONE:[phone]],FAX:[(   )    -],ADDRESS:[[address] [place] [address]]] FREE:[LAST:[Byagari],FIRST:[Javier],PHONE:[phone]],FAX:[(   )    -],[address] [place] [address] [address]],SCHEDULEDAPPT:[03/08/2021],SCHEDULEDAPPTTIME:[09:45 AM]],FREE:[LAST:[[facility]],PHONE:[phone]],FAX:[(   )    -],ADDRESS:[[address] [place] [address]]],PROVIDER:[TOKEN:[[id number]],FOLLOWUP:[2 weeks]]

## 2021-03-05 NOTE — DISCHARGE NOTE PROVIDER - NSDCMRMEDTOKEN_GEN_ALL_CORE_FT
Admelog SoloStar 100 units/mL injectable solution: 5 unit(s) injectable 3 times a day (before meals)   alcohol swabs : Apply topically to affected area 4 times a day   Basaglar KwikPen 100 units/mL subcutaneous solution: 15 unit(s) subcutaneous once a day (at bedtime)   glucometer (per patient&#x27;s insurance): Test blood sugars four times a day. Dispense #1 glucometer.  HumaLOG KwikPen 100 units/mL injectable solution: 5 unit(s) subcutaneous 3 times a day (with meals)   Insulin Pen Needles, 4mm: 1 application subcutaneously 4 times a day. ** Use with insulin pen **   lancets: 1 application subcutaneously 4 times a day   Lantus Solostar Pen 100 units/mL subcutaneous solution: 15 unit(s) subcutaneous once a day   test strips (per patient&#x27;s insurance): 1 application subcutaneously 4 times a day. ** Compatible with patient&#x27;s glucometer **   alcohol swabs : Apply topically to affected area 4 times a day   glucometer (per patient&#x27;s insurance): Test blood sugars four times a day. Dispense #1 glucometer.  HumaLOG KwikPen 100 units/mL injectable solution: 5 unit(s) subcutaneous 3 times a day (with meals)   Insulin Pen Needles, 4mm: 1 application subcutaneously 4 times a day. ** Use with insulin pen **   lancets: 1 application subcutaneously 4 times a day   Lantus Solostar Pen 100 units/mL subcutaneous solution: 15 unit(s) subcutaneous once a day

## 2021-03-05 NOTE — PROGRESS NOTE ADULT - ASSESSMENT
55 F PMHx NIDDM, cholecystectomy 2018 at [facility] presents to ED for SOB, found to be COVID postive with increased WOB and sent to 2[facility] tele for increased monitoring and O2 requirements. Currently s/p Remdesivir tx, on dexamethasone, and on HFNC as tolerated. PT consulted.

## 2021-03-05 NOTE — DISCHARGE NOTE PROVIDER - HOSPITAL COURSE
#Discharge: do not delete    Patient is 54 yo F with past medical history of NIDDM  Presented with shortness of breath, found to have Acute hypoxemic respiratory failure secondary to COVID-19  Problem List/Main Diagnoses (system-based):      #Acute hypoxemic respiratory failure.  Plan: Patient testing positive for COVID PNA in the ED requiring high flow and telemetry monitoring. Patient completed Remdesirivr along with Dexamethasone while in patient.  The patient refused Tocilizumab treatment.  The patient was requiring high flow up to 40/70 Patient has been symptomatically improving and requiring decreasing amounts of supplemental oxygen   -Wean down oxygen if tolerable currently on a 3 LPM  -Completed Remdesivir   -Completed Dexamethasone on 3/5  -Daily CMP   -Prone patient   -Family would not like to pursue Tocilizumab at this point   -Incentive Spirometry  -[place] [facility].      Problem/Plan - 2:  ·  Problem: COVID-19.  Plan: Covid management as in problem 1.     #Sepsis   Resolved  On admission, meeting  2/4 SIRS criteria (HR 97, RR 24) on ED arrival, likely in the setting of COVID PNA.  - Bcx (2/24): NGTD.      Problem/Plan - 3:  ·  Problem: Abnormal laboratory test.  Plan: Patient with elevated protein gap to 5.8.  At this point cause of gap is likely secondary to COVID, other etiologies - HIV and HEP panel have been negative.   -f/u SPEP   -f/u UPEP.      Problem/Plan - 4:  ·  Problem: Diabetes.  Plan: History of DM, on home Metformin 500mg qd for the last 3 years. A1c on admission 11. Blood sugar levels being worsened by steroids. Will readjust insulin regimen when steroid course is completed, and if remains deranged during treatment.    -Lantus 18  -Lispro 13   -ISS.      Problem/Plan - 5:  ·  Problem: Transaminitis.  Plan: Patient with elevated liver enzymes.  Origin at this point likely COVID, patient has not been complaining of any GI related symptoms and abdomen is benign on physical exam.   -Abdominal Ultrasound Ordered  -Daily CMP  -Monitor for GI complaints.      Problem/Plan - 6:  Problem: Pleuritic chest pain. Plan: RESOLVED  Pt reports R upper sternal chest pain with inspiration, and when coughing. Pain is non-radiating, not associated with nausea or diaphoresis  -Trop negative, no longer symptomatic.        Inpatient treatment course:   New medications:   Labs to be followed outpatient:   Exam to be followed outpatient:    #Discharge: do not delete    Patient is 56 yo F with past medical history of NIDDM  Presented with shortness of breath, found to have Acute hypoxemic respiratory failure secondary to COVID-19  Problem List/Main Diagnoses (system-based):      #Acute hypoxemic respiratory failure secondary to COVID-19    Patient testing positive for COVID PNA in the ED requiring high flow and telemetry monitoring. Patient completed Remdesirivr along with Dexamethasone while in patient.  The patient refused Tocilizumab treatment.  The patient was requiring high flow up to 40/70 Patient has been symptomatically improving and requiring decreasing amounts of supplemental oxygen, and currently on 2L.  Patient will be discharged on 2L of NC.     #Sepsis   Resolved  On admission, meeting  2/4 SIRS criteria (HR 97, RR 24) on ED arrival, likely in the setting of COVID PNA, blood cultures showing no growth with no other signs of infection.     #Abnormal laboratory test.  Plan: Patient with elevated protein gap to 5.8.  At this point cause of gap is likely secondary to COVID, other etiologies - HIV and HEP panel have been negative.  Patient SPEP and UPEP ordered. Patient will follow up outpatient for follow up on these labs.     #Diabetes.    Patient with history of DM, on home Metformin 500mg qd for the last 3 years. A1c on admission 11. Blood sugar levels were worsened by steroids useage. Insulin was up-titrated to maximum of Lantus 18 and Lispro 13.  Upon discharge patient will be on Lantus 15 and Lispro 5.       #Transaminitis.     Patient with elevated liver enzymes.  Origin at this point likely COVID, patient has not been complaining of any GI related symptoms and abdomen is benign on physical exam. Abdominal ultrasound was ordered which did not reveal any underlying pathology.     #Pleuritic chest pain.   Pt reports R upper sternal chest pain with inspiration, and when coughing. Pain is non-radiating, not associated with nausea or diaphoresis. Troponins were negative.  Pain resolved during admission and was secondary to COVID -19.     Inpatient treatment course: Patient is a 56 yo F with a PMH of NIDDM (on Metformin 500mg qd A1c of 11.3) who presented to ED with SOB and chest pain x 3 days. Patient reported that she had been  experiencing shortness of breath, progressively worsening with exertion, associated with R upper sternal chest pain, non-radiating, worse with inspiration. Patient with negative troponin levels and testing positive for COVID on Feb 24.  She completed 5d Remdesivir course and   Decadron on 3/5. Patient initially requiring HFNC titrating upwards to 40/70%.  Pt with elevated D-Dimers however Duplex U/S is negative. During her stay on 2WO patient had improved symptomatically with decreased shortness of breath and no longer experienced chest pain.  Patient had been weaned down to 2L NC.  Pt course complicated by elevated blood sugars, with maximum insulin regimen of  18 Lantus and 13 Lispro pre-meal.  During admission patient had elevated AlK phos, AST, and ALT. Patient has not complained of abdominal symptoms during admission, and has benign abdominal exam. However due to consistent elevation abdominal ultrasound was ordered which did not reveal any pathology. Further protein gap was noted on lab work, hepatitis panel, HIV have test have returned negative.  Still pending results of SPEP and UPEP.  Patient is stable to be stepped down to [facility].     New medications: Lantus and Lispro   Labs to be followed outpatient: SPEP, UPEP, Alk phos, AST/ALT  Exam to be followed outpatient: None   #Discharge: do not delete    Patient is 54 yo F with past medical history of NIDDM  Presented with shortness of breath, found to have Acute hypoxemic respiratory failure secondary to COVID-19  Problem List/Main Diagnoses (system-based):      #Acute hypoxemic respiratory failure secondary to COVID-19    Patient testing positive for COVID PNA in the ED requiring high flow and telemetry monitoring. Patient completed Remdesirivr along with Dexamethasone while in patient.  The patient refused Tocilizumab treatment.  The patient was requiring high flow up to 40/70 Patient has been symptomatically improving and requiring decreasing amounts of supplemental oxygen, and currently on 2L.  Patient will be discharged on 2L of NC.     #Sepsis   Resolved  On admission, meeting  2/4 SIRS criteria (HR 97, RR 24) on ED arrival, likely in the setting of COVID PNA, blood cultures showing no growth with no other signs of infection.     #Abnormal laboratory test.    Patient with elevated protein gap to 5.8.  At this point cause of gap is likely secondary to COVID, other etiologies - HIV and HEP panel have been negative.  Patient SPEP and UPEP ordered. Patient will follow up outpatient for follow up on these labs.     #Diabetes.    Patient with history of DM, on home Metformin 500mg qd for the last 3 years. A1c on admission 11. Blood sugar levels were worsened by steroids useage. Insulin was up-titrated to maximum of Lantus 18 and Lispro 13.  Upon discharge patient will be on Lantus 15 and Lispro 5.       #Transaminitis.     Patient with elevated liver enzymes.  Origin at this point likely COVID, patient has not been complaining of any GI related symptoms and abdomen is benign on physical exam. Abdominal ultrasound was ordered which did not reveal any underlying pathology.     #Pleuritic chest pain.   Pt reports R upper sternal chest pain with inspiration, and when coughing. Pain is non-radiating, not associated with nausea or diaphoresis. Troponins were negative.  Pain resolved during admission and was secondary to COVID -19.     Inpatient treatment course: 54 yo F with a PMH of NIDDM (on Metformin 500mg qd A1c of 11.3) who presented to ED with SOB and chest pain x 3 days. Patient reported that she had been  experiencing shortness of breath, progressively worsening with exertion, associated with R upper sternal chest pain, non-radiating, worse with inspiration. Patient with negative troponin levels and testing positive for COVID on Feb 24.  She completed 5d Remdesivir course and  Decadron on 3/5. Patient required escalating amounts of O2 and was transferred  to [facility].  Pt with elevated D-Dimers however Duplex U/S is negative. During her stay on [facility] patient required a maximum of High Flow oxygen supplmentation up to 40/70.  During her course she had improved symptomatically with decreased shortness of breath and no longer experienced chest pain.  Patient had been weaned down to 2L NC.  Pt course complicated by elevated blood sugars, with maximum insulin regimen of  18 Lantus and 13 Lispro pre-meal.  During admission patient had elevated AlK phos, AST, and ALT. Patient has not complained of abdominal symptoms during admission, and has benign abdominal exam. However due to consistent elevation abdominal ultrasound was ordered which did not reveal any pathology. Further protein gap was noted on lab work, hepatitis panel, HIV have test have returned negative.  Still pending results of SPEP and UPEP.  Patient is stable to be stepped down to [facility].     New medications: Lantus and Lispro   Labs to be followed outpatient: SPEP, UPEP, Alk phos, AST/ALT  Exam to be followed outpatient: None   #Discharge: do not delete    Patient is 56 yo F with past medical history of NIDDM  Presented with shortness of breath, found to have Acute hypoxemic respiratory failure secondary to COVID-19  Problem List/Main Diagnoses (system-based):     #Acute hypoxemic respiratory failure secondary to COVID-19    Patient testing positive for COVID PNA in the ED requiring high flow and telemetry monitoring. Patient completed Remdesirivr along with Dexamethasone while in patient.  The patient refused Tocilizumab treatment.  The patient was requiring high flow up to 40/70 Patient has been symptomatically improving and requiring decreasing amounts of supplemental oxygen, and currently on 2L.  Patient will be discharged on 2L of NC.     #Sepsis   Resolved  On admission, meeting  2/4 SIRS criteria (HR 97, RR 24) on ED arrival, likely in the setting of COVID PNA, blood cultures showing no growth with no other signs of infection.     #Abnormal laboratory test.    Patient with elevated protein gap to 5.8.  At this point cause of gap is likely secondary to COVID, other etiologies - HIV and HEP panel have been negative.  Patient SPEP and UPEP ordered. Patient will follow up outpatient for follow up on these labs.     #Diabetes.    Patient with history of DM, on home Metformin 500mg qd for the last 3 years. A1c on admission 11. Blood sugar levels were worsened by steroids useage. Insulin was up-titrated to maximum of Lantus 18 and Lispro 13.  Upon discharge patient will be on Lantus 15 and Lispro 5.       #Transaminitis.     Patient with elevated liver enzymes.  Origin at this point likely COVID, patient has not been complaining of any GI related symptoms and abdomen is benign on physical exam. Abdominal ultrasound was ordered which did not reveal any underlying pathology.     #Pleuritic chest pain.   Pt reports R upper sternal chest pain with inspiration, and when coughing. Pain is non-radiating, not associated with nausea or diaphoresis. Troponins were negative.  Pain resolved during admission and was secondary to COVID -19.     Inpatient treatment course: 56 yo F with a PMH of NIDDM (on Metformin 500mg qd A1c of 11.3) who presented to ED with SOB and chest pain x 3 days. Patient reported that she had been  experiencing shortness of breath, progressively worsening with exertion, associated with R upper sternal chest pain, non-radiating, worse with inspiration. Patient with negative troponin levels and testing positive for COVID on Feb 24.  She completed 5d Remdesivir course and  Decadron on 3/5. Patient required escalating amounts of O2 and was transferred  to [facility].  Pt with elevated D-Dimers however Duplex U/S is negative. During her stay on [facility] patient required a maximum of High Flow oxygen supplmentation up to 40/70.  During her course she had improved symptomatically with decreased shortness of breath and no longer experienced chest pain.  Patient had been weaned down to 2L NC.  Pt course complicated by elevated blood sugars, with maximum insulin regimen of  18 Lantus and 13 Lispro pre-meal.  During admission patient had elevated AlK phos, AST, and ALT. Patient has not complained of abdominal symptoms during admission, and has benign abdominal exam. However due to consistent elevation abdominal ultrasound was ordered which did not reveal any pathology. Further protein gap was noted on lab work, hepatitis panel, HIV have test have returned negative.  Still pending results of SPEP and UPEP.  Patient is stable to be stepped down to [facility]F.     New medications: Lantus and Lispro   Labs to be followed outpatient: SPEP, UPEP, Alk phos, AST/ALT  Exam to be followed outpatient: None   #Discharge: do not delete    Patient is 54 yo F with past medical history of NIDDM  Presented with shortness of breath, found to have Acute hypoxemic respiratory failure secondary to COVID-19  Problem List/Main Diagnoses (system-based):     #Acute hypoxemic respiratory failure secondary to COVID-19    Patient testing positive for COVID PNA in the ED requiring high flow and telemetry monitoring. Patient completed Remdesirivr along with Dexamethasone while in patient.  The patient refused Tocilizumab treatment.  The patient was requiring high flow up to 40/70 Patient has been symptomatically improving and requiring decreasing amounts of supplemental oxygen, and currently on 2L.  Patient will be discharged on 2L of NC.     #Sepsis   Resolved  On admission, meeting  2/4 SIRS criteria (HR 97, RR 24) on ED arrival, likely in the setting of COVID PNA, blood cultures showing no growth with no other signs of infection.     #Abnormal laboratory test.    Patient with elevated protein gap to 5.8.  At this point cause of gap is likely secondary to COVID, other etiologies - HIV and HEP panel have been negative.  Patient SPEP and UPEP ordered. Patient will follow up outpatient for follow up on these labs.     #Diabetes.    Patient with history of DM, on home Metformin 500mg qd for the last 3 years. A1c on admission 11. Blood sugar levels were worsened by steroids useage. Insulin was up-titrated to maximum of Lantus 18 and Lispro 13.  Upon discharge patient will be on Lantus 15 and Lispro 5.       #Transaminitis.     Patient with elevated liver enzymes.  Origin at this point likely COVID, patient has not been complaining of any GI related symptoms and abdomen is benign on physical exam. Abdominal ultrasound was ordered which did not reveal any underlying pathology.     #Pleuritic chest pain.   Pt reports R upper sternal chest pain with inspiration, and when coughing. Pain is non-radiating, not associated with nausea or diaphoresis. Troponins were negative.  Pain resolved during admission and was secondary to COVID -19.     Inpatient treatment course: 54 yo F with a PMH of NIDDM (on Metformin 500mg qd A1c of 11.3) who presented to ED with SOB and chest pain x 3 days. Patient reported that she had been  experiencing shortness of breath, progressively worsening with exertion, associated with R upper sternal chest pain, non-radiating, worse with inspiration. Patient with negative troponin levels and testing positive for COVID on Feb 24.  She completed 5d Remdesivir course and  Decadron on 3/5. Patient required escalating amounts of O2 and was transferred  to [facility].  Pt with elevated D-Dimers however Duplex U/S is negative. During her stay on [facility] patient required a maximum of High Flow oxygen supplmentation up to 40/70.  During her course she had improved symptomatically with decreased shortness of breath and no longer experienced chest pain.  Patient had been weaned down to 2L NC.  Pt course complicated by elevated blood sugars, with maximum insulin regimen of  18 Lantus and 13 Lispro pre-meal.  During admission patient had elevated AlK phos, AST, and ALT. Patient has not complained of abdominal symptoms during admission, and has benign abdominal exam. However due to consistent elevation abdominal ultrasound was ordered which did not reveal any pathology. Further protein gap was noted on lab work, hepatitis panel, HIV have test have returned negative.  Still pending results of SPEP and UPEP.     New medications: Lantus and Lispro   Labs to be followed outpatient: SPEP, UPEP, Alk phos, AST/ALT  Exam to be followed outpatient: None

## 2021-03-05 NOTE — PROGRESS NOTE ADULT - ATTENDING COMMENTS
This is a 55-year-old lady with a history of diabetes mellitus who was admitted with 3 days of shortness of breath and right-sided chest pain.  The patient required supplemental oxygen with high flow nasal cannula at the rate of 45 L/min and 60% FiO2.  The patient had low-grade temperatures to 100.3 °F and tested positive for Covid.  She was given Decadron and remdesivir.  Her liver functions were elevated.  I reviewed her chest x-ray.  It shows bilateral peripheral and nodular opacities.    The patient did not have any acute issues overnight.  She remains on high flow nasal cannula at 40 L of flow-60% FIO2.  She states that her breathing has improved.  Her blood pressure is low normal.  He is breathing 25 times a minute.  Her SPO2 is 90%.  The patient rolls to left lateral decubitus position.  We will continue to monitor markers of inflammation.  She has received scheduled receiving remdesivir full course  Leg Dopplers were negative.  We will repeat a chest x-ray in the morning.
This is a 55-year-old lady with a history of diabetes mellitus who was admitted with 3 days of shortness of breath and right-sided chest pain.  The patient required supplemental oxygen with high flow nasal cannula at the rate of 45 L/min and 60% FiO2.  The patient had low-grade temperatures to 100.3 °F and tested positive for Covid.  She was given Decadron and remdesivir.  Her liver functions were elevated.  I reviewed her chest x-ray.  It shows bilateral peripheral and nodular opacities.    The patient did not have any acute issues overnight.  She remains on high flow nasal cannula at 40 L of flow-55% FIO2.  She states that her breathing has improved.  Her blood pressure is low normal.  He is breathing 20 times a minute.  Her SPO2 is 92%.  The patient rolls to left lateral decubitus position.  We will continue to monitor markers of inflammation.  She is scheduled receiving remdesivir for 1 more day.
Although she feels her work of breathing is better, her oxygen needs have not improved and remain high at 70%. The IDSA has suggested consideration of tocilizumab in this setting and have reviewed potential benefit on mortality and clinical status at the potential risk of an increased incidence of infection. We have therefore ordered tocilizumab and we have reviewed the above with her.
This is a 55-year-old lady with a history of diabetes mellitus who was admitted with 3 days of shortness of breath and right-sided chest pain.  The patient required supplemental oxygen with high flow nasal cannula at the rate of 45 L/min and 60% FiO2.  The patient had low-grade temperatures to 100.3 °F and tested positive for Covid.  She was given Decadron and remdesivir.  Her liver functions were elevated.  I reviewed her chest x-ray.  It shows bilateral peripheral and nodular opacities.  Her rocs index is 8.6.  We plan to get D-dimers, leg Dopplers and follow-up markers of inflammation.  Currently her CRP 7.79, her ferritin is 363 and a D-dimers are normal.  The patient had no acute issues overnight.  We were able to cut down on the high flow nasal cannula flow to 40 L/min.  He is breathing at approximately 30 breaths/min.  He is not tachycardic.  Her D-dimers have risen slightly from 2 89-4 37.  Her CRP remains stable at 7.23.  The ferritin is 265  We will continue current therapy.  We will give her incentive spirometry and follow-up her D-dimers.  We will also get leg Dopplers done.  Assess need for CT chest with contrast based on above tests
Marked improvement last few days with more awake proning. Now down to 2-3 l/mwith SaO2 in 91-95% range. We are arranging for insulin administration teaching. Her U/S of the liver is normal and her lever circulation is patent so the LFT abnormalities are likely covid related and should reverse over time. Discharge planning is in progress.
Refused tocizilumab yesterday and today looks better with slight improvement in oxygen needs. Will hold off for now.
This is a 55-year-old lady with a history of diabetes mellitus who was admitted with 3 days of shortness of breath and right-sided chest pain.  The patient required supplemental oxygen with high flow nasal cannula at the rate of 45 L/min and 60% FiO2.  The patient had low-grade temperatures to 100.3 °F and tested positive for Covid.  She was given Decadron and remdesivir.  Her liver functions were elevated.  I reviewed her chest x-ray.  It shows bilateral peripheral and nodular opacities.  Her RADHA index on admission to  was 8.6.  The patient did not have any acute issues overnight.  She remains on high flow nasal cannula at 60 L of flow-55% FIO2.  She states that her breathing has improved.  Her blood pressure is low normal.  He is breathing 22 times a minute.  Her SPO2 is 90%.  We will plan to put the patient in the awake prone position
This is a 55-year-old lady with a history of diabetes mellitus who was admitted with 3 days of shortness of breath and right-sided chest pain.  The patient required supplemental oxygen with high flow nasal cannula at the rate of 45 L/min and 60% FiO2.  The patient had low-grade temperatures to 100.3 °F and tested positive for Covid.  She was given Decadron and remdesivir.  Her liver functions were elevated.  I reviewed her chest x-ray.  It shows bilateral peripheral and nodular opacities.    The patient did not have any acute issues overnight.  She remains on high flow nasal cannula at 40 L of flow-70% FIO2.  She states that her breathing is slowly improving.  Her blood pressure is low normal.  She is breathing 22 times a minute.  Her SPO2 is 94%.  The patient rolls to left lateral decubitus position.  We will continue to monitor markers of inflammation.  She has received scheduled receiving remdesivir full course  Leg Dopplers were negative.   Reviewed the chest x-ray that was performed yesterday.  It shows evidence of bilateral effusions.  We will get an ultrasound done to assess the quantity of fluid especially on the left side

## 2021-03-06 ENCOUNTER — TRANSCRIPTION ENCOUNTER (OUTPATIENT)
Age: 56
End: 2021-03-06

## 2021-03-06 VITALS — RESPIRATION RATE: 24 BRPM | DIASTOLIC BLOOD PRESSURE: 46 MMHG | SYSTOLIC BLOOD PRESSURE: 95 MMHG | HEART RATE: 73 BPM

## 2021-03-06 DIAGNOSIS — R74.8 ABNORMAL LEVELS OF OTHER SERUM ENZYMES: ICD-10-CM

## 2021-03-06 DIAGNOSIS — E11.9 TYPE 2 DIABETES MELLITUS WITHOUT COMPLICATIONS: ICD-10-CM

## 2021-03-06 LAB
ALBUMIN SERPL ELPH-MCNC: 2.7 G/DL — LOW (ref 3.3–5)
ALP SERPL-CCNC: 199 U/L — HIGH (ref 40–120)
ALT FLD-CCNC: 91 U/L — HIGH (ref 10–45)
ANION GAP SERPL CALC-SCNC: 9 MMOL/L — SIGNIFICANT CHANGE UP (ref 5–17)
AST SERPL-CCNC: 121 U/L — HIGH (ref 10–40)
BILIRUB SERPL-MCNC: 0.6 MG/DL — SIGNIFICANT CHANGE UP (ref 0.2–1.2)
BUN SERPL-MCNC: 18 MG/DL — SIGNIFICANT CHANGE UP (ref 7–23)
CALCIUM SERPL-MCNC: 8.3 MG/DL — LOW (ref 8.4–10.5)
CHLORIDE SERPL-SCNC: 105 MMOL/L — SIGNIFICANT CHANGE UP (ref 96–108)
CO2 SERPL-SCNC: 26 MMOL/L — SIGNIFICANT CHANGE UP (ref 22–31)
CREAT SERPL-MCNC: 0.6 MG/DL — SIGNIFICANT CHANGE UP (ref 0.5–1.3)
GLUCOSE BLDC GLUCOMTR-MCNC: 152 MG/DL — HIGH (ref 70–99)
GLUCOSE BLDC GLUCOMTR-MCNC: 193 MG/DL — HIGH (ref 70–99)
GLUCOSE BLDC GLUCOMTR-MCNC: 251 MG/DL — HIGH (ref 70–99)
GLUCOSE SERPL-MCNC: 172 MG/DL — HIGH (ref 70–99)
HCT VFR BLD CALC: 41.6 % — SIGNIFICANT CHANGE UP (ref 34.5–45)
HGB BLD-MCNC: 13.4 G/DL — SIGNIFICANT CHANGE UP (ref 11.5–15.5)
MAGNESIUM SERPL-MCNC: 2 MG/DL — SIGNIFICANT CHANGE UP (ref 1.6–2.6)
MCHC RBC-ENTMCNC: 28.7 PG — SIGNIFICANT CHANGE UP (ref 27–34)
MCHC RBC-ENTMCNC: 32.2 GM/DL — SIGNIFICANT CHANGE UP (ref 32–36)
MCV RBC AUTO: 89.1 FL — SIGNIFICANT CHANGE UP (ref 80–100)
NRBC # BLD: 0 /100 WBCS — SIGNIFICANT CHANGE UP (ref 0–0)
PHOSPHATE SERPL-MCNC: 3.1 MG/DL — SIGNIFICANT CHANGE UP (ref 2.5–4.5)
PLATELET # BLD AUTO: 234 K/UL — SIGNIFICANT CHANGE UP (ref 150–400)
POTASSIUM SERPL-MCNC: 4.4 MMOL/L — SIGNIFICANT CHANGE UP (ref 3.5–5.3)
POTASSIUM SERPL-SCNC: 4.4 MMOL/L — SIGNIFICANT CHANGE UP (ref 3.5–5.3)
PROT SERPL-MCNC: 6.4 G/DL — SIGNIFICANT CHANGE UP (ref 6–8.3)
RBC # BLD: 4.67 M/UL — SIGNIFICANT CHANGE UP (ref 3.8–5.2)
RBC # FLD: 14.7 % — HIGH (ref 10.3–14.5)
SODIUM SERPL-SCNC: 140 MMOL/L — SIGNIFICANT CHANGE UP (ref 135–145)
WBC # BLD: 11.31 K/UL — HIGH (ref 3.8–10.5)
WBC # FLD AUTO: 11.31 K/UL — HIGH (ref 3.8–10.5)

## 2021-03-06 PROCEDURE — 85025 COMPLETE CBC W/AUTO DIFF WBC: CPT

## 2021-03-06 PROCEDURE — 82803 BLOOD GASES ANY COMBINATION: CPT

## 2021-03-06 PROCEDURE — 87389 HIV-1 AG W/HIV-1&-2 AB AG IA: CPT

## 2021-03-06 PROCEDURE — 83605 ASSAY OF LACTIC ACID: CPT

## 2021-03-06 PROCEDURE — 97530 THERAPEUTIC ACTIVITIES: CPT

## 2021-03-06 PROCEDURE — U0003: CPT

## 2021-03-06 PROCEDURE — 82977 ASSAY OF GGT: CPT

## 2021-03-06 PROCEDURE — 86140 C-REACTIVE PROTEIN: CPT

## 2021-03-06 PROCEDURE — 93970 EXTREMITY STUDY: CPT

## 2021-03-06 PROCEDURE — 83880 ASSAY OF NATRIURETIC PEPTIDE: CPT

## 2021-03-06 PROCEDURE — 97116 GAIT TRAINING THERAPY: CPT

## 2021-03-06 PROCEDURE — 84165 PROTEIN E-PHORESIS SERUM: CPT

## 2021-03-06 PROCEDURE — 85730 THROMBOPLASTIN TIME PARTIAL: CPT

## 2021-03-06 PROCEDURE — 85379 FIBRIN DEGRADATION QUANT: CPT

## 2021-03-06 PROCEDURE — 0225U NFCT DS DNA&RNA 21 SARSCOV2: CPT

## 2021-03-06 PROCEDURE — 82962 GLUCOSE BLOOD TEST: CPT

## 2021-03-06 PROCEDURE — 96374 THER/PROPH/DIAG INJ IV PUSH: CPT

## 2021-03-06 PROCEDURE — 93005 ELECTROCARDIOGRAM TRACING: CPT

## 2021-03-06 PROCEDURE — U0005: CPT

## 2021-03-06 PROCEDURE — 82728 ASSAY OF FERRITIN: CPT

## 2021-03-06 PROCEDURE — 85027 COMPLETE CBC AUTOMATED: CPT

## 2021-03-06 PROCEDURE — 83735 ASSAY OF MAGNESIUM: CPT

## 2021-03-06 PROCEDURE — 76705 ECHO EXAM OF ABDOMEN: CPT

## 2021-03-06 PROCEDURE — 82330 ASSAY OF CALCIUM: CPT

## 2021-03-06 PROCEDURE — 83036 HEMOGLOBIN GLYCOSYLATED A1C: CPT

## 2021-03-06 PROCEDURE — 80053 COMPREHEN METABOLIC PANEL: CPT

## 2021-03-06 PROCEDURE — 84295 ASSAY OF SERUM SODIUM: CPT

## 2021-03-06 PROCEDURE — 99285 EMERGENCY DEPT VISIT HI MDM: CPT | Mod: 25

## 2021-03-06 PROCEDURE — 80074 ACUTE HEPATITIS PANEL: CPT

## 2021-03-06 PROCEDURE — 84145 PROCALCITONIN (PCT): CPT

## 2021-03-06 PROCEDURE — 87040 BLOOD CULTURE FOR BACTERIA: CPT

## 2021-03-06 PROCEDURE — 99233 SBSQ HOSP IP/OBS HIGH 50: CPT | Mod: GC

## 2021-03-06 PROCEDURE — 85610 PROTHROMBIN TIME: CPT

## 2021-03-06 PROCEDURE — 97110 THERAPEUTIC EXERCISES: CPT

## 2021-03-06 PROCEDURE — 84155 ASSAY OF PROTEIN SERUM: CPT

## 2021-03-06 PROCEDURE — 71045 X-RAY EXAM CHEST 1 VIEW: CPT

## 2021-03-06 PROCEDURE — 84484 ASSAY OF TROPONIN QUANT: CPT

## 2021-03-06 PROCEDURE — 84100 ASSAY OF PHOSPHORUS: CPT

## 2021-03-06 PROCEDURE — 97161 PT EVAL LOW COMPLEX 20 MIN: CPT

## 2021-03-06 PROCEDURE — 84132 ASSAY OF SERUM POTASSIUM: CPT

## 2021-03-06 PROCEDURE — 86334 IMMUNOFIX E-PHORESIS SERUM: CPT

## 2021-03-06 PROCEDURE — 36415 COLL VENOUS BLD VENIPUNCTURE: CPT

## 2021-03-06 RX ADMIN — Medication 8 UNIT(S): at 11:39

## 2021-03-06 RX ADMIN — Medication 8 UNIT(S): at 10:34

## 2021-03-06 RX ADMIN — Medication 6: at 11:37

## 2021-03-06 NOTE — PROGRESS NOTE ADULT - PROBLEM SELECTOR PROBLEM 4
Transaminitis
Diabetes
Transaminitis
Transaminitis
Diabetes
Transaminitis
Diabetes
Transaminitis
Transaminitis
Diabetes

## 2021-03-06 NOTE — PROGRESS NOTE ADULT - PROBLEM SELECTOR PROBLEM 5
Transaminitis
Transaminitis
Pleuritic chest pain
Transaminitis
Transaminitis

## 2021-03-06 NOTE — PROGRESS NOTE ADULT - SUBJECTIVE AND OBJECTIVE BOX
O/N Events: No significant events overnight     Subjective/ROS: Patient states she was feeling well, and her breathing has improved, no cough, chest tightness, pleuritic chest pain, wheezing, dysuria, or diarrhea, fever, night sweats, or chills.  12pt ROS otherwise negative.    VITALS  Vital Signs Last 24 Hrs  T(C): 36.2 (06 Mar 2021 05:26), Max: 36.6 (05 Mar 2021 14:09)  T(F): 97.1 (06 Mar 2021 05:26), Max: 97.9 (05 Mar 2021 14:09)  HR: 73 (06 Mar 2021 06:55) (53 - 76)  BP: 82/49 (06 Mar 2021 06:55) (82/43 - 103/57)  BP(mean): 57 (06 Mar 2021 06:55) (52 - 57)  RR: 16 (06 Mar 2021 06:55) (16 - 24)  SpO2: 94% (06 Mar 2021 06:55) (93% - 95%)    CAPILLARY BLOOD GLUCOSE      POCT Blood Glucose.: 152 mg/dL (06 Mar 2021 07:06)  POCT Blood Glucose.: 228 mg/dL (05 Mar 2021 21:51)  POCT Blood Glucose.: 239 mg/dL (05 Mar 2021 16:22)  POCT Blood Glucose.: 190 mg/dL (05 Mar 2021 11:09)      PHYSICAL EXAM  General: A&Ox3; NAD, able to converse well on 2L, no accessory muscle use   Head: NC/AT; MMM; PERRL; EOMI;  Neck: Supple; no JVD  Respiratory: Faint crackles herd in the left lung field, however right was clear to auscultation  Cardiovascular: Regular rhythm/rate; S1/S2   Gastrointestinal: Soft; NTND; normoactive BS  Extremities: WWP; no edema/cyanosis  Neurological:  CNII-XII grossly intact; no obvious focal deficits    MEDICATIONS  (STANDING):  dextrose 40% Gel 15 Gram(s) Oral once  dextrose 5%. 1000 milliLiter(s) (50 mL/Hr) IV Continuous <Continuous>  dextrose 5%. 1000 milliLiter(s) (100 mL/Hr) IV Continuous <Continuous>  dextrose 50% Injectable 25 Gram(s) IV Push once  dextrose 50% Injectable 12.5 Gram(s) IV Push once  dextrose 50% Injectable 25 Gram(s) IV Push once  enoxaparin Injectable 40 milliGRAM(s) SubCutaneous every 24 hours  glucagon  Injectable 1 milliGRAM(s) IntraMuscular once  insulin glargine Injectable (LANTUS) 15 Unit(s) SubCutaneous at bedtime  insulin lispro (ADMELOG) corrective regimen sliding scale   SubCutaneous Before meals and at bedtime  insulin lispro Injectable (ADMELOG) 8 Unit(s) SubCutaneous three times a day before meals    MEDICATIONS  (PRN):  acetaminophen   Tablet .. 650 milliGRAM(s) Oral every 6 hours PRN Temp greater or equal to 38C (100.4F), Mild Pain (1 - 3)  sodium chloride 0.65% Nasal 1 Spray(s) Both Nostrils three times a day PRN Nasal Congestion      No Known Drug Allergies  Seafood (Swelling; Rash)      LABS                        13.4   11.31 )-----------( 234      ( 06 Mar 2021 06:06 )             41.6     03-06    140  |  105  |  18  ----------------------------<  172<H>  4.4   |  26  |  0.60    Ca    8.3<L>      06 Mar 2021 06:06  Phos  3.1     03-06  Mg     2.0     03-06    TPro  6.4  /  Alb  2.7<L>  /  TBili  0.6  /  DBili  x   /  AST  121<H>  /  ALT  91<H>  /  AlkPhos  199<H>  03-06

## 2021-03-06 NOTE — PROGRESS NOTE ADULT - PROBLEM SELECTOR PLAN 4
History of DM, on home Metformin 500mg qd for the last 3 years. A1c on admission 11. Blood sugar levels being worsened by steroids. Will readjust insulin regimen as appropriate.   -Lantus 15  -Lispro 8   -ISS

## 2021-03-06 NOTE — PROGRESS NOTE ADULT - PROBLEM SELECTOR PLAN 7
F: none  E: K<4, Mg < 2  N: DASH/TLC  DVT: Lovenox  GI: PPI

## 2021-03-06 NOTE — PROGRESS NOTE ADULT - PROBLEM SELECTOR PLAN 5
Patient with elevated liver enzymes.  Origin at this point likely COVID, patient has not been complaining of any GI related symptoms and abdomen is benign on physical exam.   -Abdominal Ultrasound not revealing pathology  -Daily CMP  -Monitor for GI complaints

## 2021-03-06 NOTE — PROGRESS NOTE ADULT - ASSESSMENT
55 F PMHx NIDDM, cholecystectomy 2018 at [facility] presents to ED for SOB, found to be COVID postive with increased WOB and sent to 2WO tele for increased monitoring and O2 requirements. Currently s/p Remdesivir tx, on dexamethasone, and on HFNC as tolerated. PT consulted.

## 2021-03-06 NOTE — PROGRESS NOTE ADULT - PROBLEM SELECTOR PLAN 1
Patient testing positive for COVID PNA in the ED requiring high flow and telemetry monitoring. Patient has been symptomatically improving and requiring decreasing amounts of supplemental oxygen   -Continue O2 at 2LPM  -Completed Remdesivir   -Completed Dexamethasone on 3/5  -Daily CMP   -Prone patient   -Family would not like to pursue Tocilizumab at this point   -Incentive Spirometry  -Ocean Spray

## 2021-03-06 NOTE — PROGRESS NOTE ADULT - PROBLEM SELECTOR PROBLEM 3
Diabetes
Diabetes
Abnormal laboratory test
Abnormal laboratory test
Diabetes
Diabetes
Abnormal laboratory test
Diabetes
Diabetes
Abnormal laboratory test

## 2021-03-06 NOTE — DISCHARGE NOTE NURSING/CASE MANAGEMENT/SOCIAL WORK - PATIENT PORTAL LINK FT
You can access the FollowMyHealth Patient Portal offered by Health system by registering at the following website: [email or web address]. By joining [employer]’s FollowMyHealth portal, you will also be able to view your health information using other applications (apps) compatible with our system.

## 2021-03-06 NOTE — PROGRESS NOTE ADULT - PROBLEM SELECTOR PROBLEM 6
Pleuritic chest pain
Nutrition, metabolism, and development symptoms
Pleuritic chest pain

## 2021-03-06 NOTE — PROGRESS NOTE ADULT - PROBLEM SELECTOR PLAN 2
Covid management as in problem 1.     #Sepsis  Resolved  On admission, meeting  2/4 SIRS criteria (HR 97, RR 24) on ED arrival, likely in the setting of COVID PNA.  - Bcx (2/24): NGTD

## 2021-03-06 NOTE — PROGRESS NOTE ADULT - PROBLEM SELECTOR PLAN 6
F: none  E: K<4, Mg < 2  N: DASH/TLC  DVT: SubQ lovenox
RESOLVED  Pt reports R upper sternal chest pain with inspiration, and when coughing. Pain is non-radiating, not associated with nausea or diaphoresis  -Trop negative, no longer symptomatic.
F: none  E: K<4, Mg < 2  N: DASH/TLC  DVT: SubQ lovenox
RESOLVED  Pt reports R upper sternal chest pain with inspiration, and when coughing. Pain is non-radiating, not associated with nausea or diaphoresis  -Trop negative, no longer symptomatic.
F: none  E: K<4, Mg < 2  N: DASH/TLC  DVT: SubQ lovenox
F: none  E: K<4, Mg < 2  N: DASH/TLC  DVT: SubQ lovenox
RESOLVED  Pt reports R upper sternal chest pain with inspiration, and when coughing. Pain is non-radiating, not associated with nausea or diaphoresis  -Trop negative, no longer symptomatic.
F: none  E: K<4, Mg < 2  N: DASH/TLC  DVT: SubQ lovenox
RESOLVED  Pt reports R upper sternal chest pain with inspiration, and when coughing. Pain is non-radiating, not associated with nausea or diaphoresis  -Trop negative, no longer symptomatic.
F: none  E: K<4, Mg < 2  N: DASH/TLC  DVT: SubQ lovenox
no

## 2021-03-06 NOTE — DISCHARGE NOTE NURSING/CASE MANAGEMENT/SOCIAL WORK - NSDCFUADDAPPT_GEN_ALL_CORE_FT
Please reach out to the [facility] [place] for further management of your diabetes.     We tried calling Dr. Tam's office to set up a follow up appointment however we were unable to reach him.  Please call his office to schedule a follow up appointment.

## 2021-03-10 DIAGNOSIS — E11.9 TYPE 2 DIABETES MELLITUS WITHOUT COMPLICATIONS: ICD-10-CM

## 2021-03-10 DIAGNOSIS — J12.82 PNEUMONIA DUE TO CORONAVIRUS DISEASE 2019: ICD-10-CM

## 2021-03-10 DIAGNOSIS — U07.1 COVID-19: ICD-10-CM

## 2021-03-10 DIAGNOSIS — R77.9 ABNORMALITY OF PLASMA PROTEIN, UNSPECIFIED: ICD-10-CM

## 2021-03-10 DIAGNOSIS — R53.1 WEAKNESS: ICD-10-CM

## 2021-03-10 DIAGNOSIS — J96.01 ACUTE RESPIRATORY FAILURE WITH HYPOXIA: ICD-10-CM

## 2021-03-10 DIAGNOSIS — R74.01 ELEVATION OF LEVELS OF LIVER TRANSAMINASE LEVELS: ICD-10-CM

## 2021-03-10 DIAGNOSIS — A41.89 OTHER SPECIFIED SEPSIS: ICD-10-CM

## 2021-03-10 DIAGNOSIS — Z91.013 ALLERGY TO SEAFOOD: ICD-10-CM

## 2021-03-11 LAB
% ALBUMIN: 40.6 % — SIGNIFICANT CHANGE UP
% ALPHA 1: 4.3 % — SIGNIFICANT CHANGE UP
% ALPHA 2: 11.3 % — SIGNIFICANT CHANGE UP
% BETA: 15.6 % — SIGNIFICANT CHANGE UP
% GAMMA: 28.2 % — SIGNIFICANT CHANGE UP
% M SPIKE: 0 % — SIGNIFICANT CHANGE UP
ALBUMIN SERPL ELPH-MCNC: 2.4 G/DL — LOW (ref 3.6–5.5)
ALBUMIN/GLOB SERPL ELPH: 0.7 RATIO — SIGNIFICANT CHANGE UP
ALPHA1 GLOB SERPL ELPH-MCNC: 0.3 G/DL — SIGNIFICANT CHANGE UP (ref 0.1–0.4)
ALPHA2 GLOB SERPL ELPH-MCNC: 0.7 G/DL — SIGNIFICANT CHANGE UP (ref 0.5–1)
B-GLOBULIN SERPL ELPH-MCNC: 0.9 G/DL — SIGNIFICANT CHANGE UP (ref 0.5–1)
GAMMA GLOBULIN: 1.7 G/DL — HIGH (ref 0.6–1.6)
INTERPRETATION SERPL IFE-IMP: SIGNIFICANT CHANGE UP
M-SPIKE: 0 G/DL — SIGNIFICANT CHANGE UP (ref 0–0)
PROT PATTERN SERPL ELPH-IMP: SIGNIFICANT CHANGE UP

## 2021-04-09 NOTE — ED PROVIDER NOTE - NS ED MD DISPO ADMITTING SERVICE
BMI Counseling: Body mass index is 26 56 kg/m²  The BMI is above normal  Nutrition recommendations include decreasing portion sizes, encouraging healthy choices of fruits and vegetables, decreasing fast food intake, consuming healthier snacks, limiting drinks that contain sugar, moderation in carbohydrate intake, increasing intake of lean protein, reducing intake of saturated and trans fat and reducing intake of cholesterol  Exercise recommendations include vigorous physical activity 75 minutes/week, exercising 3-5 times per week and strength training exercises  No pharmacotherapy was ordered  Patient referred to PCP due to patient being overweight  BMI 26 56   He is doing well with weight loss          Assessment/Plan:     Presents in office for follow up HTN - has cut down again his BP med amlodipine to 5 mg and takes Losartan 100 mg both once a day  Increased the Amlodipine to 10 mg po daily   Discussed to not reduce his BP meds on his own   He is losing weight doing well with diet and with time we will start reducing medications but now his BP is still not at goal   Will need follow up labs at some point  Cardiac work up ok at the hospital however he is still to follow up with Cardiology as dicussed       No problem-specific Assessment & Plan notes found for this encounter  Diagnoses and all orders for this visit:    Essential hypertension    Hypertensive urgency  -     Discontinue: amLODIPine (NORVASC) 5 mg tablet; Take 1 tablet (5 mg total) by mouth daily  -     amLODIPine (NORVASC) 10 mg tablet; Take 1 tablet (10 mg total) by mouth daily       Subjective:     Patient ID: Kena Lindsey is a 57 y o  male  Presents in office for follow up HTN - has cut down again his BP med amlodipine to 5 mg and takes Losartan 100 mg both once a day     Increased the Amlodipine to 10 mg po daily   Discussed to not reduce his BP meds on his own   He is losing weight doing well with diet and with time we will start reducing medications but now his BP is still not at goal   Will need follow up labs at some point  Cardiac work up ok at the hospital however he is still to follow up with Cardiology as dicussed   Denies any chest pain or any shortness of breath he is actually feeling pretty well     Patient Active Problem List:     Mediastinal lymphadenopathy     Spondylosis of cervical region without myelopathy or radiculopathy     Osteoarthritis of left elbow     Palpitations     JADYN on CPAP     History of elbow surgery     Foraminal stenosis of cervical region     DDD (degenerative disc disease), cervical     Impaired fasting glucose     Right hand pain     Gout of multiple sites     Gastroesophageal reflux disease without esophagitis     Other insomnia     Lipoma of neck     Cervical lymphadenopathy     Cellulitis of index finger, left     Encounter for screening for COVID-19     COVID-19 virus infection     YOAV (acute kidney injury) ([facility] [address] )     Elevated liver enzymes     Hypertension     Pneumonia due to COVID-19 virus        Review of Systems   Constitutional: Negative for chills, fatigue and fever  HENT: Negative for congestion, sinus pain and sore throat  Eyes: Negative  Respiratory: Negative for cough and shortness of breath (intermittent shortness of breath )  Cardiovascular: Negative for chest pain and palpitations  Gastrointestinal: Negative for abdominal distention, abdominal pain and nausea  Endocrine: Negative  Genitourinary: Negative for difficulty urinating and flank pain  Musculoskeletal: Negative for arthralgias  Allergic/Immunologic: Positive for environmental allergies  Neurological: Negative for weakness and headaches  Psychiatric/Behavioral: Negative for sleep disturbance and suicidal ideas  The patient is not nervous/anxious  Objective:     Physical Exam  Vitals signs and nursing note reviewed  Constitutional:       Appearance: Normal appearance        Comments: BMI 26 56 HENT:      Head: Atraumatic  Mouth/Throat:      Mouth: Mucous membranes are dry  Eyes:      Extraocular Movements: Extraocular movements intact  Neck:      Musculoskeletal: Normal range of motion  Cardiovascular:      Rate and Rhythm: Normal rate and regular rhythm  Pulses: Normal pulses  Heart sounds: Normal heart sounds  Pulmonary:      Effort: Pulmonary effort is normal       Breath sounds: Normal breath sounds  Abdominal:      Palpations: Abdomen is soft  Musculoskeletal: Normal range of motion  Skin:     General: Skin is warm  Capillary Refill: Capillary refill takes less than 2 seconds  Neurological:      Mental Status: He is alert and oriented to person, place, and time  Psychiatric:         Mood and Affect: Mood normal          Behavior: Behavior normal            Vitals:    04/09/21 1149   BP: 142/78   BP Location: Right arm   Patient Position: Sitting   Cuff Size: Standard   Pulse: 94   Resp: 17   Temp: (!) 97 3 °F (36 3 °C)   TempSrc: Temporal   SpO2: 98%   Weight: 76 9 kg (169 lb 9 6 oz)   Height: 5' 7" (1 702 m)       Transitional Care Management Review:  Wai Salmon is a 57 y o  male here for TCM follow up  During the TCM phone call patient stated:    TCM Call (since 3/9/2021)     Date and time call was made  3/12/2021  1:15 PM    Hospital care reviewed  Records reviewed    Patient was hospitialized at  [address] [place] [address]        Date of Admission  03/10/21    Date of discharge  03/11/21    Diagnosis  Hypertensive urgency    Disposition  Home    Were the patients medications reviewed and updated  Yes    Current Symptoms  None      TCM Call (since 3/9/2021)     Post hospital issues  None    Scheduled for follow up?   Yes    Did you obtain your prescribed medications  -- (Comment)  no new medications    Do you need help managing your prescriptions or medications  No    Is transportation to your appointment needed  No    I have advised the patient to call PCP with any new or worsening symptoms  Ahsan Feliz, [address]; Spouse or Significiant other    Support System  Children; Spouse    The type of support provided  Financial; Emotional; Physical    Do you have social support  Yes, as much as I need    Are you recieving any outpatient services  No    Are you recieving home care services  No    Are you using any community resources  No    Current waiver services  No    Have you fallen in the last 12 months  No    Interperter language line needed  No    Counseling  Patient    Counseling topics  Activities of daily living; patient and family education;  Importance of RX compliance          MEGHNA [facility]

## 2021-04-14 ENCOUNTER — APPOINTMENT (OUTPATIENT)
Dept: ENDOCRINOLOGY | Facility: CLINIC | Age: 56
End: 2021-04-14
Payer: MEDICAID

## 2021-04-14 VITALS
WEIGHT: 147 LBS | DIASTOLIC BLOOD PRESSURE: 75 MMHG | HEART RATE: 98 BPM | SYSTOLIC BLOOD PRESSURE: 122 MMHG | HEIGHT: 62 IN | BODY MASS INDEX: 27.05 KG/M2

## 2021-04-14 PROCEDURE — 99072 ADDL SUPL MATRL&STAF TM PHE: CPT

## 2021-04-14 PROCEDURE — 99205 OFFICE O/P NEW HI 60 MIN: CPT | Mod: 25

## 2021-04-14 PROCEDURE — 82962 GLUCOSE BLOOD TEST: CPT

## 2021-04-14 NOTE — PHYSICAL EXAM
[Alert] : alert [Normal Outer Ear/Nose] : the ears and nose were normal in appearance [No Neck Mass] : no neck mass was observed [Thyroid Not Enlarged] : the thyroid was not enlarged [No Thyroid Nodules] : no palpable thyroid nodules [No Respiratory Distress] : no respiratory distress [Normal S1, S2] : normal S1 and S2 [No Edema] : no peripheral edema [Normal Bowel Sounds] : normal bowel sounds [Spine Straight] : spine straight [Normal Gait] : normal gait [Acanthosis Nigricans] : acanthosis nigricans present [Right Foot Was Examined] : right foot ~C was examined [Left Foot Was Examined] : left foot ~C was examined [Vibration Dec.] : diminished vibratory sensation at the level of the toes [Normal Reflexes] : deep tendon reflexes were 2+ and symmetric [Oriented x3] : oriented to person, place, and time

## 2021-04-15 LAB — GLUCOSE BLDC GLUCOMTR-MCNC: 182
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
## 2021-04-16 NOTE — REVIEW OF SYSTEMS
[Negative] : Heme/Lymph [Blurred Vision] : no blurred vision [Nocturia] : no nocturia [de-identified] : no pins and needles sensation in LE

## 2021-04-16 NOTE — ADDENDUM
[FreeTextEntry1] : I, Bart Galdamez, acted solely as a scribe for Dr. Jose Coffman on this date. 04/14/2021.

## 2021-04-16 NOTE — ASSESSMENT
[Carbohydrate Consistent Diet] : carbohydrate consistent diet [Importance of Diet and Exercise] : importance of diet and exercise to improve glycemic control, achieve weight loss and improve cardiovascular health [Exercise/Effect on Glucose] : exercise/effect on glucose [FreeTextEntry1] : 54 y/o F pt with:\par \par 1. DM (dx 3 years ago) with DM related complications of retinopathy- Lasix surgery:\par A1c of 11.3% upon discharge on 3/2021. \par Diabetes treatment goals discussed, including target goals for BP, LDL-c, A1c, and body weight. \par Discussed the importance of BS monitoring. Advised pt to monitor pre and 2 hours postprandial BS.  \par Briefly summarized anti-diabetic medications, including benefits and side effects along with insulin kinetics with pt. \par Recommend pt continue present DM management until the assessment of current evaluation is completed. \par Refer pt to see nurse educator for comprehensive DM teachings, including sick days management. \par \par \par Return in:  1 week

## 2021-04-16 NOTE — END OF VISIT
[FreeTextEntry3] : All medical record entries made by the Scribe were at my, Dr. Jose Coffman, direction and personally dictated by me on 04/14/2021. I have reviewed the chart and agree that the record accurately reflects my personal performance of the history, physical exam, assessment and plan. I have also personally directed, reviewed and agreed with the chart.  [Time Spent: ___ minutes] : I have spent [unfilled] minutes of time on the encounter.

## 2021-04-22 ENCOUNTER — APPOINTMENT (OUTPATIENT)
Dept: ENDOCRINOLOGY | Facility: CLINIC | Age: 56
End: 2021-04-22
Payer: MEDICAID

## 2021-04-22 ENCOUNTER — APPOINTMENT (OUTPATIENT)
Dept: ENDOCRINOLOGY | Facility: CLINIC | Age: 56
End: 2021-04-22

## 2021-04-22 VITALS
BODY MASS INDEX: 26.89 KG/M2 | SYSTOLIC BLOOD PRESSURE: 122 MMHG | HEART RATE: 97 BPM | DIASTOLIC BLOOD PRESSURE: 80 MMHG | WEIGHT: 147 LBS

## 2021-04-22 DIAGNOSIS — E11.69 TYPE 2 DIABETES MELLITUS WITH OTHER SPECIFIED COMPLICATION: ICD-10-CM

## 2021-04-22 DIAGNOSIS — E78.5 TYPE 2 DIABETES MELLITUS WITH OTHER SPECIFIED COMPLICATION: ICD-10-CM

## 2021-04-22 DIAGNOSIS — E11.65 TYPE 2 DIABETES MELLITUS WITH HYPERGLYCEMIA: ICD-10-CM

## 2021-04-22 PROCEDURE — 99214 OFFICE O/P EST MOD 30 MIN: CPT | Mod: 25

## 2021-04-22 PROCEDURE — 99072 ADDL SUPL MATRL&STAF TM PHE: CPT

## 2021-04-22 PROCEDURE — 82962 GLUCOSE BLOOD TEST: CPT

## 2021-04-22 RX ORDER — METFORMIN HYDROCHLORIDE 850 MG/1
850 TABLET, COATED ORAL
Qty: 30 | Refills: 5 | Status: ACTIVE | COMMUNITY
Start: 2021-04-22 | End: 1900-01-01

## 2021-04-22 RX ORDER — ELECTROLYTES/DEXTROSE
32G X 4 MM SOLUTION, ORAL ORAL
Qty: 100 | Refills: 4 | Status: ACTIVE | COMMUNITY
Start: 2021-04-22 | End: 1900-01-01

## 2021-04-23 PROBLEM — E11.69 HYPERLIPIDEMIA ASSOCIATED WITH TYPE 2 DIABETES MELLITUS: Status: ACTIVE | Noted: 2021-04-23

## 2021-04-23 LAB — GLUCOSE BLDC GLUCOMTR-MCNC: 171
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
## 2021-04-23 NOTE — ASSESSMENT
[Insulin Self-Administration] : insulin self-administration [FreeTextEntry1] : 54 y/o F pt with:\par \par 1. DM (dx 3 years ago) with DM related complications of retinopathy- Lasix surgery:\par A1c of 11.3% upon discharge on 3/2021. \par Pt is on MDI: Lantus 15 u qd and Lispro 5 u ac. However, pt is missing her basal insulin \par DM treatment goals discussed with pt. \par Pt is requesting for 4 mm needles; will rx it. Taught pt and her daughter on how to use insulin. Initiate Metformin 850 mg at dinner. \par Follow up with RD. \par \par 2. HLD with high LDL-c of 133 and HDL-c of 54:\par Advised pt too reduce saturated and trans fat consumption. Discussed on statins. \par \par Return in: 4 months.

## 2021-04-23 NOTE — END OF VISIT
[FreeTextEntry3] : All medical record entries made by the Scribe were at my, Dr. Jose Coffman, direction and personally dictated by me on 04/22/2021. I have reviewed the chart and agree that the record accurately reflects my personal performance of the history, physical exam, assessment and plan. I have also personally directed, reviewed and agreed with the chart.  [Time Spent: ___ minutes] : I have spent [unfilled] minutes of time on the encounter.

## 2021-04-23 NOTE — ADDENDUM
[FreeTextEntry1] : I, Bart Galdamez, acted solely as a scribe for Dr. Jose Coffman on this date. 04/22/2021.

## 2021-04-26 ENCOUNTER — APPOINTMENT (OUTPATIENT)
Dept: GASTROENTEROLOGY | Facility: CLINIC | Age: 56
End: 2021-04-26

## 2021-05-04 ENCOUNTER — APPOINTMENT (OUTPATIENT)
Dept: ENDOCRINOLOGY | Facility: CLINIC | Age: 56
End: 2021-05-04

## 2021-07-12 ENCOUNTER — APPOINTMENT (OUTPATIENT)
Dept: ENDOCRINOLOGY | Facility: CLINIC | Age: 56
End: 2021-07-12

## 2021-10-14 NOTE — PROGRESS NOTE ADULT - SUBJECTIVE AND OBJECTIVE BOX
*** THIS NOTE IS IN PROGRESS      INTERVAL HPI/OVERNIGHT EVENTS:    OVERNIGHT: No overnight events.  SUBJECTIVE: Patient seen and examined at bedside.     ROS:  CV: Denies chest pain  Resp: Denies SOB  GI: Denies abdominal pain, constipation, diarrhea, nausea, vomiting  : Denies dysuria  ID: Denies fevers, chills  MSK: Denies joint pain     OBJECTIVE:    VITAL SIGNS:  ICU Vital Signs Last 24 Hrs  T(C): 35.9 (22 Mar 2018 01:04), Max: 37 (21 Mar 2018 22:00)  T(F): 96.7 (22 Mar 2018 01:04), Max: 98.6 (21 Mar 2018 22:00)  HR: 52 (22 Mar 2018 05:00) (52 - 85)  BP: 118/60 (22 Mar 2018 00:00) (94/70 - 118/60)  BP(mean): 88 (22 Mar 2018 00:00) (83 - 88)  ABP: 120/64 (22 Mar 2018 05:00) (91/51 - 142/88)  ABP(mean): 86 (22 Mar 2018 05:00) (66 - 110)  RR: 21 (22 Mar 2018 05:00) (10 - 21)  SpO2: 97% (22 Mar 2018 05:00) (93% - 99%)        03-20 @ 07:01  -  03-21 @ 07:00  --------------------------------------------------------  IN: 2541 mL / OUT: 3250 mL / NET: -709 mL    03-21 @ 07:01  -  03-22 @ 06:17  --------------------------------------------------------  IN: 412 mL / OUT: 600 mL / NET: -188 mL      CAPILLARY BLOOD GLUCOSE  292 (22 Mar 2018 01:00)      POCT Blood Glucose.: 292 mg/dL (22 Mar 2018 01:22)      PHYSICAL EXAM:    General: NAD, comfortable  HEENT: NCAT, PERRL, clear conjunctiva, no scleral icterus  Neck: supple, no JVD  Respiratory: CTA b/l, no wheezing, rhonchi, rales  Cardiovascular: RRR, normal S1S2, no M/R/G  Abdomen: soft, NT/ND, bowel sounds in all four quadrants, no palpable masses  Extremities: WWP, no clubbing, cyanosis, or edema  Neuro:     MEDICATIONS:  MEDICATIONS  (STANDING):  dextrose 5%. 1000 milliLiter(s) (50 mL/Hr) IV Continuous <Continuous>  dextrose 50% Injectable 12.5 Gram(s) IV Push once  heparin  Injectable 5000 Unit(s) SubCutaneous every 8 hours  insulin glargine Injectable (LANTUS) 12 Unit(s) SubCutaneous at bedtime  insulin lispro (HumaLOG) corrective regimen sliding scale   SubCutaneous every 4 hours  norepinephrine Infusion 0.01 MICROgram(s)/kG/Min (1.275 mL/Hr) IV Continuous <Continuous>  pantoprazole  Injectable 40 milliGRAM(s) IV Push daily  piperacillin/tazobactam IVPB. 3.375 Gram(s) IV Intermittent every 6 hours    MEDICATIONS  (PRN):  dextrose Gel 1 Dose(s) Oral once PRN Blood Glucose LESS THAN 70 milliGRAM(s)/deciliter  glucagon  Injectable 1 milliGRAM(s) IntraMuscular once PRN Glucose LESS THAN 70 milligrams/deciliter      ALLERGIES:  Allergies    No Known Drug Allergies  Seafood (Swelling; Rash)    Intolerances        LABS:                        10.7   11.1  )-----------( 162      ( 21 Mar 2018 05:20 )             32.2     03-21    149<H>  |  114<H>  |  6<L>  ----------------------------<  248<H>  4.0   |  22  |  0.60    Ca    8.3<L>      21 Mar 2018 10:03  Mg     2.3     03-21    TPro  6.5  /  Alb  2.5<L>  /  TBili  2.5<H>  /  DBili  x   /  AST  184<H>  /  ALT  95<H>  /  AlkPhos  285<H>  03-21    PT/INR - ( 21 Mar 2018 05:20 )   PT: 14.0 sec;   INR: 1.26          PTT - ( 21 Mar 2018 05:20 )  PTT:35.7 sec      RADIOLOGY & ADDITIONAL TESTS: Reviewed. INTERVAL HPI/OVERNIGHT EVENTS:    OVERNIGHT: Noted to be hyperglycemic even after 12 units lantus, started on insulin gtt. Loose stools X2 with LLQ abdominal pain that self resolved.   SUBJECTIVE: Patient seen and examined at bedside. Reports significant improvement of symptoms and is tolerating diet well. No abdominal pain, n or v.     ROS:  CV: Denies chest pain  Resp: Denies SOB  GI: Denies abdominal pain, constipation, diarrhea, nausea, vomiting  : Denies dysuria  ID: Denies fevers, chills  MSK: Denies joint pain     OBJECTIVE:    VITAL SIGNS:  ICU Vital Signs Last 24 Hrs  T(C): 35.9 (22 Mar 2018 01:04), Max: 37 (21 Mar 2018 22:00)  T(F): 96.7 (22 Mar 2018 01:04), Max: 98.6 (21 Mar 2018 22:00)  HR: 52 (22 Mar 2018 05:00) (52 - 85)  BP: 118/60 (22 Mar 2018 00:00) (94/70 - 118/60)  BP(mean): 88 (22 Mar 2018 00:00) (83 - 88)  ABP: 120/64 (22 Mar 2018 05:00) (91/51 - 142/88)  ABP(mean): 86 (22 Mar 2018 05:00) (66 - 110)  RR: 21 (22 Mar 2018 05:00) (10 - 21)  SpO2: 97% (22 Mar 2018 05:00) (93% - 99%)        03-20 @ 07:01  -  03-21 @ 07:00  --------------------------------------------------------  IN: 2541 mL / OUT: 3250 mL / NET: -709 mL    03-21 @ 07:01  -  03-22 @ 06:17  --------------------------------------------------------  IN: 412 mL / OUT: 600 mL / NET: -188 mL      CAPILLARY BLOOD GLUCOSE  292 (22 Mar 2018 01:00)      POCT Blood Glucose.: 292 mg/dL (22 Mar 2018 01:22)      PHYSICAL EXAM:    General: NAD, comfortable  HEENT: NCAT, PERRL, clear conjunctiva, no scleral icterus  Neck: supple, no JVD  Respiratory: R. basilar inspiratory crackles, otherwise lungs CTA b/l w/good air entry   Cardiovascular: RRR, normal S1S2, no M/R/G  Abdomen: soft, NT/ND, bowel sounds in all four quadrants, no palpable masses  Extremities: WWP, no clubbing, cyanosis, or edema  Neuro: A&OX3, no focal deficits.     MEDICATIONS:  MEDICATIONS  (STANDING):  dextrose 5%. 1000 milliLiter(s) (50 mL/Hr) IV Continuous <Continuous>  dextrose 50% Injectable 12.5 Gram(s) IV Push once  heparin  Injectable 5000 Unit(s) SubCutaneous every 8 hours  insulin glargine Injectable (LANTUS) 12 Unit(s) SubCutaneous at bedtime  insulin lispro (HumaLOG) corrective regimen sliding scale   SubCutaneous every 4 hours  norepinephrine Infusion 0.01 MICROgram(s)/kG/Min (1.275 mL/Hr) IV Continuous <Continuous>  pantoprazole  Injectable 40 milliGRAM(s) IV Push daily  piperacillin/tazobactam IVPB. 3.375 Gram(s) IV Intermittent every 6 hours    MEDICATIONS  (PRN):  dextrose Gel 1 Dose(s) Oral once PRN Blood Glucose LESS THAN 70 milliGRAM(s)/deciliter  glucagon  Injectable 1 milliGRAM(s) IntraMuscular once PRN Glucose LESS THAN 70 milligrams/deciliter      ALLERGIES:  Allergies    No Known Drug Allergies  Seafood (Swelling; Rash)    Intolerances        LABS:                        10.7   11.1  )-----------( 162      ( 21 Mar 2018 05:20 )             32.2     03-21    149<H>  |  114<H>  |  6<L>  ----------------------------<  248<H>  4.0   |  22  |  0.60    Ca    8.3<L>      21 Mar 2018 10:03  Mg     2.3     03-21    TPro  6.5  /  Alb  2.5<L>  /  TBili  2.5<H>  /  DBili  x   /  AST  184<H>  /  ALT  95<H>  /  AlkPhos  285<H>  03-21    PT/INR - ( 21 Mar 2018 05:20 )   PT: 14.0 sec;   INR: 1.26          PTT - ( 21 Mar 2018 05:20 )  PTT:35.7 sec      RADIOLOGY & ADDITIONAL TESTS: Reviewed. INTERN TRANSFER NOTE MICU -> SURGERY    51 yo F w/PMH DM II presented to [facility] complaining of n/v/abdominal pain. Transferred to [facility] for ERCP. Underwent sphincterotomy and removal of 2 stones via ERCP on 3/20 however due to anatomy procedure was not completed on first attempt and patient went for repeat ERCP 3/21 with stent placed, will need to f/u with GI for removal of stent within two months of discharge. On admission patient with severe sepsis 2/2 klebsiella pneumoniae bacteremia due to ascending cholangitis. Weaned off pressors early 3/20 and has been on zosyn since 3/20 (klebsiella is sensitive to zosyn).  Surveillance cultures done 3/21, no growth to date. Surgery consulted for cholecystectomy. Patient is hemodynamically stable and medically cleared for transfer to surgery service pending planned cholecystectomy.     INTERVAL HPI/OVERNIGHT EVENTS:    OVERNIGHT: Noted to be hyperglycemic even after 12 units lantus, started on insulin gtt. Loose stools X2 with LLQ abdominal pain that self resolved.   SUBJECTIVE: Patient seen and examined at bedside. Reports significant improvement of symptoms and is tolerating diet well. No abdominal pain, n or v.     ROS:  CV: Denies chest pain  Resp: Denies SOB  GI: Denies abdominal pain, constipation, diarrhea, nausea, vomiting  : Denies dysuria  ID: Denies fevers, chills  MSK: Denies joint pain     OBJECTIVE:    VITAL SIGNS:  ICU Vital Signs Last 24 Hrs  T(C): 35.9 (22 Mar 2018 01:04), Max: 37 (21 Mar 2018 22:00)  T(F): 96.7 (22 Mar 2018 01:04), Max: 98.6 (21 Mar 2018 22:00)  HR: 52 (22 Mar 2018 05:00) (52 - 85)  BP: 118/60 (22 Mar 2018 00:00) (94/70 - 118/60)  BP(mean): 88 (22 Mar 2018 00:00) (83 - 88)  ABP: 120/64 (22 Mar 2018 05:00) (91/51 - 142/88)  ABP(mean): 86 (22 Mar 2018 05:00) (66 - 110)  RR: 21 (22 Mar 2018 05:00) (10 - 21)  SpO2: 97% (22 Mar 2018 05:00) (93% - 99%)        03-20 @ 07:01 - 03-21 @ 07:00  --------------------------------------------------------  IN: 2541 mL / OUT: 3250 mL / NET: -709 mL    03-21 @ 07:01 - 03-22 @ 06:17  --------------------------------------------------------  IN: 412 mL / OUT: 600 mL / NET: -188 mL      CAPILLARY BLOOD GLUCOSE  292 (22 Mar 2018 01:00)      POCT Blood Glucose.: 292 mg/dL (22 Mar 2018 01:22)      PHYSICAL EXAM:    General: NAD, comfortable  HEENT: NCAT, PERRL, clear conjunctiva, no scleral icterus  Neck: supple, no JVD  Respiratory: R. basilar inspiratory crackles, otherwise lungs CTA b/l w/good air entry   Cardiovascular: RRR, normal S1S2, no M/R/G  Abdomen: soft, NT/ND, bowel sounds in all four quadrants, no palpable masses  Extremities: WWP, no clubbing, cyanosis, or edema  Neuro: A&OX3, no focal deficits.     MEDICATIONS:  MEDICATIONS  (STANDING):  dextrose 5%. 1000 milliLiter(s) (50 mL/Hr) IV Continuous <Continuous>  dextrose 50% Injectable 12.5 Gram(s) IV Push once  heparin  Injectable 5000 Unit(s) SubCutaneous every 8 hours  insulin glargine Injectable (LANTUS) 12 Unit(s) SubCutaneous at bedtime  insulin lispro (HumaLOG) corrective regimen sliding scale   SubCutaneous every 4 hours  norepinephrine Infusion 0.01 MICROgram(s)/kG/Min (1.275 mL/Hr) IV Continuous <Continuous>  pantoprazole  Injectable 40 milliGRAM(s) IV Push daily  piperacillin/tazobactam IVPB. 3.375 Gram(s) IV Intermittent every 6 hours    MEDICATIONS  (PRN):  dextrose Gel 1 Dose(s) Oral once PRN Blood Glucose LESS THAN 70 milliGRAM(s)/deciliter  glucagon  Injectable 1 milliGRAM(s) IntraMuscular once PRN Glucose LESS THAN 70 milligrams/deciliter      ALLERGIES:  Allergies    No Known Drug Allergies  Seafood (Swelling; Rash)    Intolerances        LABS:                        10.7   11.1  )-----------( 162      ( 21 Mar 2018 05:20 )             32.2     03-21    149<H>  |  114<H>  |  6<L>  ----------------------------<  248<H>  4.0   |  22  |  0.60    Ca    8.3<L>      21 Mar 2018 10:03  Mg     2.3     03-21    TPro  6.5  /  Alb  2.5<L>  /  TBili  2.5<H>  /  DBili  x   /  AST  184<H>  /  ALT  95<H>  /  AlkPhos  285<H>  03-21    PT/INR - ( 21 Mar 2018 05:20 )   PT: 14.0 sec;   INR: 1.26          PTT - ( 21 Mar 2018 05:20 )  PTT:35.7 sec      RADIOLOGY & ADDITIONAL TESTS: Reviewed. Female

## 2021-11-02 NOTE — PHYSICAL THERAPY INITIAL EVALUATION ADULT - ASR EQUIP NEEDS DISCH PT EVAL
I confirm that I have reviewed the available information in the medical record prior to the scheduled surgery.  
I confirm that I have reviewed the available information in the medical record prior to the scheduled surgery.    Labs pending   
none anticipated

## 2022-04-18 ENCOUNTER — APPOINTMENT (OUTPATIENT)
Dept: GASTROENTEROLOGY | Facility: CLINIC | Age: 57
End: 2022-04-18

## 2022-09-12 NOTE — PROGRESS NOTE ADULT - ASSESSMENT
ASSESSMENT: 52F with PMH DM, intermittent hypotension presents as transfer from [facility] with chronic epigastric pain, poor appetite, nausea, and vomiting with gram negative bacteremia and concern for cholangitis.     GI  #Epigastric pain  - Concern for broad differential including cholangitis, GERD, hiatal hernia, esophageal stricture/dysmotility. At present in setting of meeting sepsis criteria and clinical symptoms, cholangitis/choledocholithiasis likely etiology   - GI on board and appreciate recs  - S/p ERCP attempt 3/20, unable to cannulate due to anatomy. Two stones were removed in sphincterotomy, as well as pus  - GI will re-attempt ERCP today  - F/u H. pylori stool antigen   - Maintain active T&S   - Zosyn for empiric coverage of presumed intra-abdominal infection as well as gram negative bacteremia, day 2   - f/u Bcx from [facility] here growing klebsiella pneumonia in one bottle.    - Trend CMP  - Protonix 40mg IV qd  - Clear liquid diet with NPO in setting of planned ERCP later today    Pulm  #Throat pain   - RVP negative, rapid strep negative  - AM CXR with possible small bilateral pleural effusions    Endocrine  - On home metformin, 500mg, held in favor of sliding scale insulin  - A1C 10.6%  - Will require diabetic education and counseling  - F/u FSG and adjust Lantus as per SSI coverage  - TSH WNL    RENAL  - Increased urinary frequency without dysuria or incontinence  - UA negative for UTI    #Hypernatremia- Na on AM labs 147, was 138 yesterday.  - Repeat Na, if truly elevated will correct for acute hypernatremia.     FEN:  F: IVF NS at 100cc/hr   E: Replete K<4 Mg<2  N: NPO ASSESSMENT: 52F with PMH DM, intermittent hypotension presents as transfer from [facility] with chronic epigastric pain, poor appetite, nausea, and vomiting with gram negative bacteremia and concern for cholangitis.     GI  #Epigastric pain  - Concern for broad differential including cholangitis, GERD, hiatal hernia, esophageal stricture/dysmotility. At present in setting of meeting sepsis criteria and clinical symptoms, cholangitis/choledocholithiasis likely etiology   - GI on board and appreciate recs  - S/p ERCP attempt 3/20, unable to cannulate due to anatomy. Two stones were removed in sphincterotomy, as well as pus  - GI will re-attempt ERCP today  - F/u H. pylori stool antigen   - Maintain active T&S   - Zosyn for empiric coverage of presumed intra-abdominal infection as well as gram negative bacteremia, day 2   - f/u Bcx from [facility] here growing klebsiella pneumonia in one bottle.    - Trend CMP  - Protonix 40mg IV qd  - Clear liquid diet with NPO in setting of planned ERCP later today    ID  #Gram negative bacteremia     Pulm  #Throat pain   - RVP negative, rapid strep negative  - AM CXR with possible small bilateral pleural effusions    Endocrine  - On home metformin, 500mg, held in favor of sliding scale insulin  - A1C 10.6%  - Will require diabetic education and counseling  - F/u FSG and adjust Lantus as per SSI coverage  - TSH WNL    RENAL  - Increased urinary frequency without dysuria or incontinence  - UA negative for UTI    #Hypernatremia- Na on AM labs 147, was 138 yesterday.  - Repeat Na, if truly elevated will correct for acute hypernatremia.     FEN:  F: IVF NS at 100cc/hr   E: Replete K<4 Mg<2  N: NPO NSICU ASSESSMENT: 52F with PMH DM, intermittent hypotension presents as transfer from [facility] with chronic epigastric pain, poor appetite, nausea, and vomiting with gram negative bacteremia and concern for cholangitis.     ID  #Septic shock 2/2 gram negative bacteremia due to cholangitis.  - BC growing klebsiella pneumonia, will send surveillance cultures today.  - Improving, weaning off levophed  - C/w zosyn (day 2)  - Source control with repeat ERCP today.       GI  #Epigastric pain  - Concern for broad differential including cholangitis, GERD, hiatal hernia, esophageal stricture/dysmotility. At present in setting of meeting sepsis criteria and clinical symptoms, cholangitis/choledocholithiasis likely etiology   - GI on board and appreciate recs  - S/p ERCP attempt 3/20, unable to cannulate due to anatomy. Two stones were removed in sphincterotomy, as well as pus  - GI will re-attempt ERCP today  - F/u H. pylori stool antigen   - Maintain active T&S   - Zosyn for empiric coverage of presumed intra-abdominal infection as well as gram negative bacteremia, day 2   - f/u Bcx from [facility] here growing klebsiella pneumonia in one bottle.    - Trend CMP  - Protonix 40mg IV qd  - Clear liquid diet with NPO in setting of planned ERCP later today      Pulm  #Throat pain   - RVP negative, rapid strep negative  - AM CXR with possible small bilateral pleural effusions    Endocrine  - On home metformin, 500mg, held in favor of sliding scale insulin  - A1C 10.6%  - Will require diabetic education and counseling  - F/u FSG and adjust Lantus as per SSI coverage  - TSH WNL    RENAL  - Increased urinary frequency without dysuria or incontinence  - UA negative for UTI    #Hypernatremia- Na on AM labs 147, was 138 yesterday.  - Repeat Na, if truly elevated will correct for acute hypernatremia.     FEN:  F: IVF NS at 100cc/hr   E: Replete K<4 Mg<2  N: NPO

## 2022-10-27 NOTE — DISCHARGE NOTE ADULT - PROVIDER RX CONTACT NUMBER
Patient is here alone today.        If any information or results need to be relayed from today's visit, the best way to contact the patient is via [phone] (mobile) - Patient gives verbal permission to leave a detailed voicemail at the number provided.       [phone]

## 2023-06-27 NOTE — BRIEF OPERATIVE NOTE - PROCEDURE POST
<<-----Click on this checkbox to enter Post-Op Dx Carac Counseling:  I discussed with the patient the risks of Carac including but not limited to erythema, scaling, itching, weeping, crusting, and pain.

## 2024-07-08 ENCOUNTER — NON-APPOINTMENT (OUTPATIENT)
Age: 59
End: 2024-07-08